# Patient Record
Sex: FEMALE | Race: WHITE | NOT HISPANIC OR LATINO | Employment: OTHER | URBAN - METROPOLITAN AREA
[De-identification: names, ages, dates, MRNs, and addresses within clinical notes are randomized per-mention and may not be internally consistent; named-entity substitution may affect disease eponyms.]

---

## 2017-07-28 ENCOUNTER — TELEPHONE (OUTPATIENT)
Dept: OBSTETRICS AND GYNECOLOGY | Facility: CLINIC | Age: 82
End: 2017-07-28

## 2017-07-28 DIAGNOSIS — Z85.3 HISTORY OF BREAST CANCER: Primary | ICD-10-CM

## 2017-07-28 NOTE — TELEPHONE ENCOUNTER
Last annual 10/16 with Dr. Lou. Patient has been having mammograms at an outside facility. Requesting mammogram at [place] this year. Patient has history of breast cancer at 36. Please place order. Unaware of this should be ordered as a diagnostic? Please advise .

## 2017-07-28 NOTE — TELEPHONE ENCOUNTER
----- Message from Maty Robertson sent at 7/28/2017  9:19 AM CDT -----  Contact: Self  Willa Kim  MRN: [id number]  Home Phone      [phone]  Work Phone      Not on file.  Mobile          [phone]    Patient Care Team:  Андрей Kenney MD as PCP - General (Family Medicine)  OB? No  What phone number can you be reached at? [phone]  Message:   Patient states she was getting all of her mammograms at [facility] and her Doctor no longer practices in that area, she is due for her mammogram and would like her orders send to [facility]. Please return call.

## 2017-07-31 ENCOUNTER — HOSPITAL ENCOUNTER (OUTPATIENT)
Dept: RADIOLOGY | Facility: HOSPITAL | Age: 82
Discharge: HOME | End: 2017-07-31
Attending: OBSTETRICS & GYNECOLOGY
Payer: MEDICARE

## 2017-07-31 VITALS — BODY MASS INDEX: 28.99 KG/M2 | HEIGHT: 65 IN | WEIGHT: 174 LBS

## 2017-07-31 DIAGNOSIS — Z85.3 HISTORY OF BREAST CANCER: ICD-10-CM

## 2017-07-31 DIAGNOSIS — Z12.31 ENCOUNTER FOR SCREENING MAMMOGRAM FOR BREAST CANCER: ICD-10-CM

## 2017-07-31 PROCEDURE — 77067 SCR MAMMO BI INCL CAD: CPT | Mod: 26,,, | Performed by: RADIOLOGY

## 2017-07-31 PROCEDURE — 77063 BREAST TOMOSYNTHESIS BI: CPT | Mod: 26,,, | Performed by: RADIOLOGY

## 2017-07-31 PROCEDURE — 77067 SCR MAMMO BI INCL CAD: CPT | Mod: TC

## 2017-09-25 PROBLEM — R35.0 FREQUENCY OF MICTURITION: Status: ACTIVE | Noted: 2017-09-25

## 2017-09-25 PROBLEM — R39.15 URGENCY OF MICTURITION: Status: ACTIVE | Noted: 2017-09-25

## 2017-09-25 PROBLEM — N39.41 URGE INCONTINENCE: Status: ACTIVE | Noted: 2017-09-25

## 2017-09-26 ENCOUNTER — TELEPHONE (OUTPATIENT)
Dept: OBSTETRICS AND GYNECOLOGY | Facility: CLINIC | Age: 82
End: 2017-09-26

## 2017-11-14 ENCOUNTER — OFFICE VISIT (OUTPATIENT)
Dept: OBSTETRICS AND GYNECOLOGY | Facility: CLINIC | Age: 82
End: 2017-11-14
Payer: MEDICARE

## 2017-11-14 VITALS
HEIGHT: 65 IN | RESPIRATION RATE: 14 BRPM | WEIGHT: 175 LBS | DIASTOLIC BLOOD PRESSURE: 76 MMHG | BODY MASS INDEX: 29.16 KG/M2 | SYSTOLIC BLOOD PRESSURE: 112 MMHG | HEART RATE: 55 BPM

## 2017-11-14 DIAGNOSIS — Z85.3 HISTORY OF LEFT BREAST CANCER: ICD-10-CM

## 2017-11-14 DIAGNOSIS — Z01.419 WELL WOMAN EXAM WITH ROUTINE GYNECOLOGICAL EXAM: Primary | ICD-10-CM

## 2017-11-14 DIAGNOSIS — Z90.79 HISTORY OF TOTAL HYSTERECTOMY WITH BILATERAL SALPINGO-OOPHORECTOMY (BSO): ICD-10-CM

## 2017-11-14 DIAGNOSIS — Z78.0 POSTMENOPAUSAL STATUS: ICD-10-CM

## 2017-11-14 DIAGNOSIS — Z90.722 HISTORY OF TOTAL HYSTERECTOMY WITH BILATERAL SALPINGO-OOPHORECTOMY (BSO): ICD-10-CM

## 2017-11-14 DIAGNOSIS — Z90.12 HISTORY OF MASTECTOMY, LEFT: ICD-10-CM

## 2017-11-14 DIAGNOSIS — Z90.710 HISTORY OF TOTAL HYSTERECTOMY WITH BILATERAL SALPINGO-OOPHORECTOMY (BSO): ICD-10-CM

## 2017-11-14 DIAGNOSIS — N39.0 RECURRENT UTI: ICD-10-CM

## 2017-11-14 PROCEDURE — G0101 CA SCREEN;PELVIC/BREAST EXAM: HCPCS | Mod: PBBFAC,PN | Performed by: OBSTETRICS & GYNECOLOGY

## 2017-11-14 PROCEDURE — 99999 PR PBB SHADOW E&M-EST. PATIENT-LVL III: CPT | Mod: PBBFAC,,, | Performed by: OBSTETRICS & GYNECOLOGY

## 2017-11-14 PROCEDURE — G0101 CA SCREEN;PELVIC/BREAST EXAM: HCPCS | Mod: S$PBB,,, | Performed by: OBSTETRICS & GYNECOLOGY

## 2017-11-14 PROCEDURE — 99213 OFFICE O/P EST LOW 20 MIN: CPT | Mod: PBBFAC,25,PN | Performed by: OBSTETRICS & GYNECOLOGY

## 2017-11-14 RX ORDER — CIPROFLOXACIN 250 MG/5ML
KIT ORAL 2 TIMES DAILY
COMMUNITY
End: 2017-12-27 | Stop reason: CLARIF

## 2017-11-14 RX ORDER — CIPROFLOXACIN 250 MG/1
250 TABLET, FILM COATED ORAL 2 TIMES DAILY
Qty: 20 TABLET | Refills: 0 | Status: SHIPPED | OUTPATIENT
Start: 2017-11-14 | End: 2017-11-24

## 2017-11-14 NOTE — PROGRESS NOTES
Subjective:    Patient ID: Willa Kim is a 82 y.o. y.o. female.     Chief Complaint: Annual Well Woman Exam     History of Present Illness:  Willa Vigil presents today for Annual Well Woman exam. She has a history of hysterectomy with BSO.  She is currently using no hormone replacement therapy and she reports no problems with menopausal symptoms. She denies breast tenderness, masses, nipple discharge. She has a history of left breast cancer when she was 36 years old and underwent mastectomy.  Her last mammogram in 07/17 was negative.  She has had recurrent UTIs and is currently still taking cipro.  Requests refill of this.  Did not have any improvement in her symptoms with macrobid.  Has seen urologist and had culture recently but states symptoms have not fully resolved.  Denies fever, chills, nausea, and vomiting.  She denies difficulty with bowel movements. She is current with colonoscopy.  She is current with health maintenance.    Past Medical History:   Diagnosis Date    A-fib     Arthritis     Breast cancer @ 37y/o    Left breast    CKD (chronic kidney disease)     Coronary artery disease     Diabetes mellitus     Encounter for blood transfusion     GERD (gastroesophageal reflux disease)     High cholesterol     History of mammogram 10/2/12    Normal    History of small bowel obstruction     Hypertension     YANIQUE (obstructive sleep apnea)     Osteoporosis, unspecified     PSVT (paroxysmal supraventricular tachycardia)      Past Surgical History:   Procedure Laterality Date    APPENDECTOMY      BACK SURGERY      CERVICAL FUSION      CHOLECYSTECTOMY      COLON SURGERY      CYSTOSCOPY      HYSTERECTOMY      DILLON --bleeding/fibroids    JOINT REPLACEMENT Right     knee    KNEE ARTHROSCOPY Right     MASTECTOMY  @ 37y/o    Left Breast    OOPHORECTOMY      BSO     Review of patient's allergies indicates:   Allergen Reactions    Celestone [betamethasone sodium phosphate]      High  blood pressure.    Keflex [cephalexin] Other (See Comments)     blisters    Pcn [penicillins] Swelling    Percodan [oxycodone hcl-oxycodone-asa]      Itching    Sulfa (sulfonamide antibiotics) Nausea And Vomiting    Trimethoprim Itching    Vibramycin [doxycycline calcium]      Heart race     Current Outpatient Prescriptions on File Prior to Visit   Medication Sig Dispense Refill    amiodarone (PACERONE) 200 MG Tab Take 100 mg by mouth once daily.       atorvastatin (LIPITOR) 10 MG tablet Take 5 mg by mouth once daily.       CALCIUM-VITAMIN D3 ORAL Take 1 tablet by mouth 2 (two) times daily. CALCIUM 1200MG/VITAMIN D3 1000IU      CRANBERRY FRUIT ORAL Take 4,200 mg by mouth 2 (two) times daily.      diphenoxylate-atropine 2.5-0.025 mg (LOMOTIL) 2.5-0.025 mg per tablet Take 1 tablet by mouth 4 (four) times daily as needed for Diarrhea.      furosemide (LASIX) 20 MG tablet Take 20 mg by mouth once daily.       losartan (COZAAR) 50 MG tablet Take 50 mg by mouth once daily.      metformin (GLUCOPHAGE) 500 MG tablet Take 500 mg by mouth 2 (two) times daily with meals. One tab in am ; 2 tabs in pm.      metoprolol tartrate (LOPRESSOR) 50 MG tablet Take 50 mg by mouth 2 (two) times daily.       mv-min-FA-D3-om3-dha-epa-fish (CARDIAMIN) 200 mcg-500 unit-200 mg Cap Take 2 capsules by mouth once daily.      nitrofurantoin, macrocrystal-monohydrate, (MACROBID) 100 MG capsule Take 100 mg by mouth 2 (two) times daily.      oxybutynin (DITROPAN-XL) 10 MG 24 hr tablet Take 10 mg by mouth once daily.      pantoprazole (PROTONIX) 40 MG tablet Take 40 mg by mouth once daily.       rivaroxaban (XARELTO) 15 mg Tab Take 15 mg by mouth once. Hold 10/3      tramadol (ULTRAM) 50 mg tablet Take 50 mg by mouth every 6 (six) hours as needed for Pain.      trimethoprim (TRIMPEX) 100 mg Tab Take 100 mg by mouth once daily.        No current facility-administered medications on file prior to visit.      Social History    Substance Use Topics    Smoking status: Former Smoker    Smokeless tobacco: Never Used    Alcohol use No     Family History   Problem Relation Age of Onset    Cancer Brother      Bladder    Cancer Sister      stomach    Heart disease Mother     Heart disease Father     Cancer Son      lung    Breast cancer Neg Hx     Colon cancer Neg Hx     Ovarian cancer Neg Hx      The following portions of the patient's history were reviewed and updated as appropriate: allergies, current medications, past family history, past medical history, past social history, past surgical history and problem list.    Menstrual History:   No LMP recorded. Patient has had a hysterectomy.    OB History      Para Term  AB Living    3 3 2 1   2    SAB TAB Ectopic Multiple Live Births            2            ROS:   CONSTITUTIONAL: Negative for fever, chills, diaphoresis, weakness, fatigue, weight loss, weight gain  ENT: negative for sore throat, nasal congestion, nasal discharge, epistaxis, tinnitus, hearing loss  EYES: negative for blurry vision, decreased vision, loss of vision, eye pain, diplopia, photophobia, discharge  SKIN: Negative for rash, itching, hives  RESPIRATORY: negative for cough, hemoptysis, shortness of breath, pleuritic chest pain, wheezing  CARDIOVASCULAR: negative for chest pain, dyspnea on exertion, orthopnea, paroxysmal nocturnal dyspnea, edema, palpitations  BREAST: negative for breast pain, mass, nipple changes, nipple discharge  GASTROINTESTINAL: negative for abdominal pain, flank pain, nausea, vomiting, diarrhea, constipation, black stool, blood in stool  GENITOURINARY: positive for urinary frequency/UTI; negative for abnormal vaginal bleeding, amenorrhea, decreased libido, dysuria, genital sores, hematuria, incontinence, menorrhagia, pelvic pain, vaginal discharge  HEMATOLOGIC/LYMPHATIC: negative for swollen lymph nodes, bleeding, bruising  MUSCULOSKELETAL: negative for back pain, joint pain,  joint stiffness, joint swelling, muscle pain, muscle weakness  NEUROLOGICAL: negative for dizzy/vertigo, headache, focal weakness, numbness/tingling, speech problems, loss of consciousness, confusion, memory loss  BEHAVORIAL/PSYCH: negative for anxiety, depression, psychosis  ENDOCRINE: negative for polydipsia/polyuria, palpitations, skin changes, temperature intolerance, unexpected weight changes  ALLERGIC/IMMUNOLOGIC: negative for urticaria, hay fever, angioedema      Objective:    Vital Signs:  Vitals:    11/14/17 1206   BP: 112/76   Pulse: (!) 55   Resp: 14       Physical Exam:  General:  alert; oriented x 4; well-nourished female   Skin:  Skin color, texture, turgor normal. No rashes or lesions   HEENT:  conjunctivae/corneas clear.    Neck: supple, trachea midline   Respiratory:  clear to auscultation bilaterally   Heart:  regular rate and rhythm   Breasts: Left breast surgically absent.  No palpable masses of right breast.  Right nipple protruding.  No nipple discharge.  No skin changes, erythema, or tenderness. No lymphadenopathy.   Abdomen:  soft, non-tender; bowel sounds normal. No CVA tenderness.    Pelvis: External genitalia: normal general appearance  Urinary system: urethral meatus normal, bladder nontender  Vaginal: atrophic mucosa; no significant prolapse; no lesions  Cervix: removed surgically  Uterus: removed surgically  Adnexa: removed surgically; nontender; no palpable masses; difficult exam secondary to body habitus   Extremities: Normal ROM; no edema, no cyanosis   Neurologial: Normal strength and tone. No focal numbness or weakness. Reflexes 2+ and equal.   Psychiatric: normal mood, speech, dress, and thought processes         Assessment:      1. Well woman exam with routine gynecological exam    2. Postmenopausal status    3. History of total hysterectomy with bilateral salpingo-oophorectomy (BSO)    4. Recurrent UTI    5. History of left breast cancer    6. History of mastectomy, left           Plan:      Well woman exam with routine gynecological exam    Postmenopausal status    History of total hysterectomy with bilateral salpingo-oophorectomy (BSO)    Recurrent UTI  -     ciprofloxacin HCl (CIPRO) 250 MG tablet; Take 1 tablet (250 mg total) by mouth 2 (two) times daily.  Dispense: 20 tablet; Refill: 0    History of left breast cancer    History of mastectomy, left        COUNSELING:  Willa Vigil was counseled on A.C.O.G. Pap guidelines and recommendations for yearly pelvic exams in addition to recommendations for yearly mammograms and monthly self breast exams. In addition she was counseled on DEXA and on adequate intake of calcium and vitamin D.  She is to see her PCP for other health maintenance. Refill cipro.  Pt advised to follow back up with urology as soon as possible.

## 2017-12-11 PROBLEM — M17.12 PRIMARY OSTEOARTHRITIS OF LEFT KNEE: Status: ACTIVE | Noted: 2017-12-11

## 2018-01-03 PROBLEM — K21.9 GASTROESOPHAGEAL REFLUX DISEASE WITHOUT ESOPHAGITIS: Status: ACTIVE | Noted: 2018-01-03

## 2018-01-03 PROBLEM — N18.9 CKD (CHRONIC KIDNEY DISEASE): Status: ACTIVE | Noted: 2018-01-03

## 2018-01-03 PROBLEM — I48.91 A-FIB: Status: ACTIVE | Noted: 2018-01-03

## 2018-01-03 PROBLEM — I10 ESSENTIAL HYPERTENSION: Status: ACTIVE | Noted: 2018-01-03

## 2018-02-01 PROBLEM — M48.56XA COMPRESSION FRACTURE OF LUMBAR SPINE, NON-TRAUMATIC: Status: ACTIVE | Noted: 2018-02-01

## 2018-02-01 PROBLEM — S32.020A COMPRESSION FRACTURE OF L2 LUMBAR VERTEBRA: Status: ACTIVE | Noted: 2018-02-01

## 2018-07-24 ENCOUNTER — TELEPHONE (OUTPATIENT)
Dept: OBSTETRICS AND GYNECOLOGY | Facility: CLINIC | Age: 83
End: 2018-07-24

## 2018-07-24 DIAGNOSIS — Z12.31 ENCOUNTER FOR SCREENING MAMMOGRAM FOR BREAST CANCER: Primary | ICD-10-CM

## 2018-07-24 NOTE — TELEPHONE ENCOUNTER
Patient requesting Mammogram orders. Patient has a history of breast cancer and a mastectomy. Last annual 11/2017. Please advise.

## 2018-07-24 NOTE — TELEPHONE ENCOUNTER
----- Message from Yenny Conte MA sent at 7/24/2018  2:16 PM CDT -----  Contact: SELF  Willa Kim  MRN: [id number]  Home Phone      [phone]  Work Phone      Not on file.  Mobile          [phone]    Patient Care Team:  Андрей Kenney MD as PCP - General (Family Medicine)  OB? No  What phone number can you be reached at?   [phone]  Message:    Would like to schedule mammo.

## 2018-08-02 ENCOUNTER — HOSPITAL ENCOUNTER (OUTPATIENT)
Dept: RADIOLOGY | Facility: HOSPITAL | Age: 83
Discharge: HOME | End: 2018-08-02
Attending: OBSTETRICS & GYNECOLOGY
Payer: MEDICARE

## 2018-08-02 VITALS — HEIGHT: 65 IN | WEIGHT: 173 LBS | BODY MASS INDEX: 28.82 KG/M2

## 2018-08-02 DIAGNOSIS — Z12.31 ENCOUNTER FOR SCREENING MAMMOGRAM FOR BREAST CANCER: ICD-10-CM

## 2018-08-02 PROCEDURE — 77063 BREAST TOMOSYNTHESIS BI: CPT | Mod: 26,,, | Performed by: RADIOLOGY

## 2018-08-02 PROCEDURE — 77067 SCR MAMMO BI INCL CAD: CPT | Mod: 26,,, | Performed by: RADIOLOGY

## 2018-08-02 PROCEDURE — 77067 SCR MAMMO BI INCL CAD: CPT | Mod: TC

## 2019-01-10 ENCOUNTER — TELEPHONE (OUTPATIENT)
Dept: OBSTETRICS AND GYNECOLOGY | Facility: CLINIC | Age: 84
End: 2019-01-10

## 2019-01-10 NOTE — TELEPHONE ENCOUNTER
----- Message from Yenny Cotne MA sent at 1/10/2019  1:39 PM CST -----  Contact: self  Willa Kim  MRN: [id number]  Home Phone      [phone]  Work Phone      Not on file.  Mobile          [phone]    Patient Care Team:  Андрей Kenney MD as PCP - General (Family Medicine)  OB? No  What phone number can you be reached at?  [phone]  Message:   Would like mammo results.  Stated mammo was done in August and was never called with results.

## 2019-01-10 NOTE — TELEPHONE ENCOUNTER
Patient should have been mailed a letter stating it was normal. We do not call for normal results.

## 2019-01-10 NOTE — TELEPHONE ENCOUNTER
Patient requesting Mammogram results from August. States she never received results. Please advise.

## 2019-06-05 PROBLEM — Z12.11 COLON CANCER SCREENING: Status: ACTIVE | Noted: 2019-06-05

## 2019-08-15 ENCOUNTER — TELEPHONE (OUTPATIENT)
Dept: OBSTETRICS AND GYNECOLOGY | Facility: CLINIC | Age: 84
End: 2019-08-15

## 2019-08-15 DIAGNOSIS — Z12.31 BREAST CANCER SCREENING BY MAMMOGRAM: Primary | ICD-10-CM

## 2019-08-15 NOTE — TELEPHONE ENCOUNTER
----- Message from Collette Brunson MA sent at 8/15/2019 11:39 AM CDT -----  Contact: Self      ----- Message -----  From: Maty Robertson  Sent: 8/15/2019  11:13 AM  To: Josi Cerrato Staff    Willa Kim  MRN: [id number]  Home Phone      [phone]  Work Phone      Not on file.  [employer]          [phone]    Patient Care Team:  Андрей Kenney MD as PCP - General (Family Medicine)  OB? No  What phone number can you be reached at?   Message:   Please link mammogram orders. Thanks.

## 2019-09-24 ENCOUNTER — HOSPITAL ENCOUNTER (OUTPATIENT)
Dept: RADIOLOGY | Facility: HOSPITAL | Age: 84
Discharge: HOME | End: 2019-09-24
Attending: ADVANCED PRACTICE MIDWIFE
Payer: MEDICARE

## 2019-09-24 VITALS — HEIGHT: 65 IN | BODY MASS INDEX: 29.49 KG/M2 | WEIGHT: 177 LBS

## 2019-09-24 DIAGNOSIS — Z12.31 BREAST CANCER SCREENING BY MAMMOGRAM: ICD-10-CM

## 2019-09-24 PROCEDURE — 77067 SCR MAMMO BI INCL CAD: CPT | Mod: 26,52,, | Performed by: RADIOLOGY

## 2019-09-24 PROCEDURE — 77063 BREAST TOMOSYNTHESIS BI: CPT | Mod: 26,52,, | Performed by: RADIOLOGY

## 2019-09-24 PROCEDURE — 77067 SCR MAMMO BI INCL CAD: CPT | Mod: TC

## 2019-09-24 PROCEDURE — 77067 MAMMO DIGITAL SCREENING RIGHT WITH TOMOSYNTHESIS_CAD: ICD-10-PCS | Mod: 26,52,, | Performed by: RADIOLOGY

## 2019-09-24 PROCEDURE — 77063 MAMMO DIGITAL SCREENING RIGHT WITH TOMOSYNTHESIS_CAD: ICD-10-PCS | Mod: 26,52,, | Performed by: RADIOLOGY

## 2019-10-14 ENCOUNTER — OFFICE VISIT (OUTPATIENT)
Dept: OBSTETRICS AND GYNECOLOGY | Facility: CLINIC | Age: 84
End: 2019-10-14
Payer: MEDICARE

## 2019-10-14 VITALS
HEIGHT: 65 IN | SYSTOLIC BLOOD PRESSURE: 114 MMHG | BODY MASS INDEX: 28.49 KG/M2 | HEART RATE: 60 BPM | RESPIRATION RATE: 16 BRPM | DIASTOLIC BLOOD PRESSURE: 60 MMHG | WEIGHT: 171 LBS

## 2019-10-14 DIAGNOSIS — Z78.0 POSTMENOPAUSAL STATUS: Primary | ICD-10-CM

## 2019-10-14 PROCEDURE — G0101 PR CA SCREEN;PELVIC/BREAST EXAM: ICD-10-PCS | Mod: S$PBB,,, | Performed by: ADVANCED PRACTICE MIDWIFE

## 2019-10-14 PROCEDURE — G0101 CA SCREEN;PELVIC/BREAST EXAM: HCPCS | Mod: S$PBB,,, | Performed by: ADVANCED PRACTICE MIDWIFE

## 2019-10-14 PROCEDURE — 99213 OFFICE O/P EST LOW 20 MIN: CPT | Mod: PBBFAC,PN,25 | Performed by: ADVANCED PRACTICE MIDWIFE

## 2019-10-14 PROCEDURE — G0101 CA SCREEN;PELVIC/BREAST EXAM: HCPCS | Mod: PBBFAC,PN | Performed by: ADVANCED PRACTICE MIDWIFE

## 2019-10-14 PROCEDURE — 99999 PR PBB SHADOW E&M-EST. PATIENT-LVL III: ICD-10-PCS | Mod: PBBFAC,,, | Performed by: ADVANCED PRACTICE MIDWIFE

## 2019-10-14 PROCEDURE — 99999 PR PBB SHADOW E&M-EST. PATIENT-LVL III: CPT | Mod: PBBFAC,,, | Performed by: ADVANCED PRACTICE MIDWIFE

## 2019-10-14 NOTE — PROGRESS NOTES
"  Subjective:    Patient ID: Willa Kim is a 84 y.o. y.o. female.     Chief Complaint: Annual Well Woman Exam     History of Present Illness:  Willa Vigil presents today for Annual Well Woman exam. She describes her menses as hysterectory .She denies pelvic pain.  She denies breast tenderness, masses, nipple discharge. She admits difficulty with urination and is taking meds for this that is working well or bowel movements. She denies menopausal symptoms such as hotflashes, vaginal dryness, and night sweats. She denies bloating, early satiety, or weight changes. She is sexually active.     The following portions of the patient's history were reviewed and updated as appropriate: allergies, current medications, past family history, past medical history, past social history, past surgical history and problem list.      Menstrual History:   No LMP recorded. Patient has had a hysterectomy..     OB History        3    Para   3    Term   2       1    AB        Living   2       SAB        TAB        Ectopic        Multiple        Live Births   2                 The following portions of the patient's history were reviewed and updated as appropriate: allergies, current medications, past family history, past medical history, past social history, past surgical history and problem list.        ROS:     Review of Systems   Endocrine: Positive for diabetes and hair loss.   Genitourinary: Positive for frequency. Negative for vaginal bleeding, vaginal discharge, vaginal pain and vaginal odor.   Neurological: Positive for headaches.   All other systems reviewed and are negative.      Objective:    Vital Signs:  Vitals:    10/14/19 1107   BP: 114/60   Pulse: 60   Resp: 16   Weight: 77.6 kg (171 lb)   Height: 5' 5" (1.651 m)         Physical Exam:  General:  alert, cooperative, appears stated age   Skin:  Skin color, texture, turgor normal. No rashes or lesions   HEENT:  conjunctivae/corneas clear. PERRL. "   Neck: supple, trachea midline, no adenopathy or thyromegally   Respiratory:  clear to auscultation bilaterally   Heart:  regular rate and rhythm, S1, S2 normal, no murmur, click, rub or gallop   Breasts:  no discharge, erythema, or tenderness, reconstructive implant at mastectomy site without palpable abnormalities   Abdomen:  normal findings: bowel sounds normal, no masses palpable, no organomegaly and soft, non-tender   Pelvis: External genitalia: normal general appearance  Urinary system: urethral meatus normal, bladder nontender  Vaginal: normal mucosa without prolapse or lesions  Cervix: removed surgically  Uterus: removed surgically  Adnexa: removed surgically   Extremities: Normal ROM; no edema, no cyanosis   Neurologial: Normal strength and tone. No focal numbness or weakness. Reflexes 2+ and equal.   Psychiatric: normal mood, speech, dress, and thought processes         Assessment:       Healthy female exam.     No diagnosis found.      Plan:       All questions answered.  Breast self exam technique reviewed and patient encouraged to perform self-exam monthly.  Follow up in 1 year.  Follow up as needed.   Dexa scan ordered      COUNSELING:  Willa Vigil was counseled on  A.C.O.G. Pap guidelines and recommendations for yearly pelvic exams in addition to recommendations for monthly self breast exams; to see her PCP for other health maintenance.

## 2020-01-21 PROBLEM — S72.102A CLOSED FRACTURE OF TROCHANTER OF LEFT FEMUR: Status: ACTIVE | Noted: 2020-01-21

## 2020-05-18 ENCOUNTER — PATIENT OUTREACH (OUTPATIENT)
Dept: ADMINISTRATIVE | Facility: OTHER | Age: 85
End: 2020-05-18

## 2020-05-18 VITALS — DIASTOLIC BLOOD PRESSURE: 68 MMHG | SYSTOLIC BLOOD PRESSURE: 124 MMHG | HEART RATE: 56 BPM | OXYGEN SATURATION: 98 %

## 2020-09-17 ENCOUNTER — TELEPHONE (OUTPATIENT)
Dept: OBSTETRICS AND GYNECOLOGY | Facility: CLINIC | Age: 85
End: 2020-09-17

## 2020-09-17 DIAGNOSIS — Z12.31 BREAST CANCER SCREENING BY MAMMOGRAM: Primary | ICD-10-CM

## 2020-09-17 NOTE — TELEPHONE ENCOUNTER
----- Message from Maty Robertson sent at 2020 10:39 AM CDT -----  Regarding: Self  Willa Kim  MRN: [id number]  : 1935  PCP: Rajiv Conte  Home Phone      [phone]  Work Phone      Not on file.  Mobile          [phone]      MESSAGE:   Please link mammogram orders. Thanks.

## 2020-11-11 ENCOUNTER — OFFICE VISIT (OUTPATIENT)
Dept: OBSTETRICS AND GYNECOLOGY | Facility: CLINIC | Age: 85
End: 2020-11-11
Payer: MEDICARE

## 2020-11-11 ENCOUNTER — HOSPITAL ENCOUNTER (OUTPATIENT)
Dept: RADIOLOGY | Facility: HOSPITAL | Age: 85
Discharge: HOME | End: 2020-11-11
Attending: OBSTETRICS & GYNECOLOGY
Payer: MEDICARE

## 2020-11-11 VITALS
HEIGHT: 65 IN | WEIGHT: 172 LBS | HEIGHT: 65 IN | RESPIRATION RATE: 18 BRPM | BODY MASS INDEX: 28.66 KG/M2 | DIASTOLIC BLOOD PRESSURE: 82 MMHG | HEART RATE: 59 BPM | WEIGHT: 175.38 LBS | SYSTOLIC BLOOD PRESSURE: 124 MMHG | BODY MASS INDEX: 29.22 KG/M2

## 2020-11-11 DIAGNOSIS — Z85.3 HISTORY OF LEFT BREAST CANCER: ICD-10-CM

## 2020-11-11 DIAGNOSIS — Z12.39 SCREENING BREAST EXAMINATION: ICD-10-CM

## 2020-11-11 DIAGNOSIS — Z12.31 BREAST CANCER SCREENING BY MAMMOGRAM: ICD-10-CM

## 2020-11-11 DIAGNOSIS — Z01.419 ENCOUNTER FOR ROUTINE GYNECOLOGIC EXAMINATION IN MEDICARE PATIENT: Primary | ICD-10-CM

## 2020-11-11 PROCEDURE — 77063 MAMMO DIGITAL SCREENING RIGHT WITH TOMO: ICD-10-PCS | Mod: 26,,, | Performed by: RADIOLOGY

## 2020-11-11 PROCEDURE — 77067 SCR MAMMO BI INCL CAD: CPT | Mod: TC

## 2020-11-11 PROCEDURE — 77067 MAMMO DIGITAL SCREENING RIGHT WITH TOMO: ICD-10-PCS | Mod: 26,,, | Performed by: RADIOLOGY

## 2020-11-11 PROCEDURE — 99999 PR STA SHADOW: CPT | Mod: PBBFAC,,, | Performed by: OBSTETRICS & GYNECOLOGY

## 2020-11-11 PROCEDURE — G0101 CA SCREEN;PELVIC/BREAST EXAM: HCPCS | Mod: S$PBB | Performed by: OBSTETRICS & GYNECOLOGY

## 2020-11-11 PROCEDURE — 99999 PR PBB SHADOW E&M-EST. PATIENT-LVL IV: CPT | Mod: PBBFAC,,, | Performed by: OBSTETRICS & GYNECOLOGY

## 2020-11-11 PROCEDURE — 77063 BREAST TOMOSYNTHESIS BI: CPT | Mod: 26,,, | Performed by: RADIOLOGY

## 2020-11-11 PROCEDURE — 99214 OFFICE O/P EST MOD 30 MIN: CPT | Mod: PBBFAC | Performed by: OBSTETRICS & GYNECOLOGY

## 2020-11-11 PROCEDURE — 99999 PR PBB SHADOW E&M-EST. PATIENT-LVL IV: ICD-10-PCS | Mod: PBBFAC,,, | Performed by: OBSTETRICS & GYNECOLOGY

## 2020-11-11 PROCEDURE — 77067 SCR MAMMO BI INCL CAD: CPT | Mod: 26,,, | Performed by: RADIOLOGY

## 2020-11-11 RX ORDER — ALBUTEROL SULFATE 0.83 MG/ML
SOLUTION RESPIRATORY (INHALATION)
COMMUNITY

## 2020-11-11 RX ORDER — FLUTICASONE PROPIONATE 50 MCG
SPRAY, SUSPENSION (ML) NASAL
COMMUNITY

## 2020-11-11 RX ORDER — CETIRIZINE HYDROCHLORIDE 10 MG/1
TABLET ORAL
COMMUNITY

## 2020-11-11 RX ORDER — KETOCONAZOLE 20 MG/ML
SHAMPOO, SUSPENSION TOPICAL
COMMUNITY

## 2020-11-11 RX ORDER — OXYBUTYNIN CHLORIDE 10 MG/1
TABLET, EXTENDED RELEASE ORAL
COMMUNITY

## 2020-11-11 RX ORDER — TRIMETHOPRIM 100 MG/1
TABLET ORAL
COMMUNITY
Start: 2020-10-26 | End: 2022-04-05

## 2020-11-11 RX ORDER — TAMSULOSIN HYDROCHLORIDE 0.4 MG/1
CAPSULE ORAL
COMMUNITY

## 2020-11-11 RX ORDER — OLMESARTAN MEDOXOMIL 20 MG/1
TABLET ORAL
COMMUNITY
End: 2022-04-05

## 2020-11-11 NOTE — PROGRESS NOTES
Subjective:    Patient ID: Willa Kim is a 85 y.o. y.o. female.     Chief Complaint: Annual Well Woman Exam     History of Present Illness:  Willa Vigil presents today for Annual Well Woman exam. She describes her menses as absent, h/o hysterecotmy and BSO.She denies pelvic pain.  She denies breast tenderness, masses, nipple discharge. She denies difficulty with urination or bowel movements. She denies menopausal symptoms such as hotflashes, vaginal dryness, and night sweats. She denies bloating, early satiety, or weight changes. She is not sexually active.     Menstrual History:   No LMP recorded. Patient has had a hysterectomy..     OB History    : 3  Para: 3  Term: 2  : 1  Livin  Live Births: 2            The following portions of the patient's history were reviewed and updated as appropriate: allergies, current medications, past family history, past medical history, past social history, past surgical history and problem list.    ROS:   CONSTITUTIONAL: Negative for fever, chills, diaphoresis, weakness, fatigue, weight loss, weight gain  ENT: negative for sore throat, nasal congestion, nasal discharge, epistaxis, tinnitus, hearing loss  EYES: negative for blurry vision, decreased vision, loss of vision, eye pain, diplopia, photophobia, discharge  SKIN: Negative for rash, itching, hives  RESPIRATORY: negative for cough, hemoptysis, shortness of breath, pleuritic chest pain, wheezing  CARDIOVASCULAR: negative for chest pain, dyspnea on exertion, orthopnea, paroxysmal nocturnal dyspnea, edema, palpitations  BREAST: negative for breast  tenderness, breast mass, nipple discharge, or skin changes  GASTROINTESTINAL: negative for abdominal pain, flank pain, nausea, vomiting, diarrhea, constipation, black stool, blood in stool  GENITOURINARY: negative for abnormal vaginal bleeding, amenorrhea, decreased libido, dysuria, genital sores, hematuria, incontinence, menorrhagia, pelvic pain,  urinary frequency, vaginal discharge  HEMATOLOGIC/LYMPHATIC: negative for swollen lymph nodes, bleeding, bruising  MUSCULOSKELETAL: muscle pain, muscle weakness, Denies: back pain, joint pain, joint stiffness, joint swelling  NEUROLOGICAL: negative for dizzy/vertigo, headache, focal weakness, numbness/tingling, speech problems, loss of consciousness, confusion, memory loss  BEHAVORIAL/PSYCH: negative for anxiety, depression, psychosis  ENDOCRINE: diabetes, negative for polydipsia/polyuria, palpitations, skin changes, temperature intolerance, unexpected weight changes  ALLERGIC/IMMUNOLOGIC: negative for urticaria, hay fever, angioedema      Objective:    Vital Signs:  Vitals:    11/11/20 1139   BP: 124/82   Pulse: (Abnormal) 59   Resp: 18       Physical Exam:  General:  alert, cooperative, no distress   Skin:  Skin color, texture, turgor normal. No rashes or lesions   HEENT:  extra ocular movement intact, sclera clear, anicteric   Neck: supple, trachea midline, no adenopathy or thyromegally   Respiratory:  Normal effort   Breasts:  no discharge, erythema, tenderness, or palpable masses; no axillary lymphadenopathy, left mastectomy   Abdomen:  soft, nontender, no palpable masses   Pelvis: External genitalia: normal general appearance  Urinary system: urethral meatus normal, bladder nontender  Vaginal: atrophic mucosa  Cervix: removed surgically  Uterus: removed surgically  Adnexa: removed surgically   Extremities: Normal ROM; no edema, no cyanosis   Neurologial: Normal strength and tone. No focal numbness or weakness.   Psychiatric: normal mood, speech, dress, and thought processes         Assessment:       Healthy female exam.     1. Encounter for routine gynecologic examination in Medicare patient    2. History of left breast cancer    3. Screening breast examination          Plan:      Encounter for routine gynecologic examination in Medicare patient    History of left breast cancer    Screening breast  examination          COUNSELING:  Willa Vigil was counseled on A.C.O.G. Pap guidelines and recommendations for yearly pelvic exams in addition to recommendations for monthly self breast exams; to see her PCP for other health maintenance.

## 2021-11-29 ENCOUNTER — TELEPHONE (OUTPATIENT)
Dept: OBSTETRICS AND GYNECOLOGY | Facility: CLINIC | Age: 86
End: 2021-11-29
Payer: MEDICARE

## 2021-11-29 DIAGNOSIS — Z12.31 BREAST CANCER SCREENING BY MAMMOGRAM: Primary | ICD-10-CM

## 2022-04-05 ENCOUNTER — OFFICE VISIT (OUTPATIENT)
Dept: OBSTETRICS AND GYNECOLOGY | Facility: CLINIC | Age: 87
End: 2022-04-05
Payer: MEDICARE

## 2022-04-05 ENCOUNTER — HOSPITAL ENCOUNTER (OUTPATIENT)
Dept: RADIOLOGY | Facility: HOSPITAL | Age: 87
Discharge: HOME | End: 2022-04-05
Attending: OBSTETRICS & GYNECOLOGY
Payer: MEDICARE

## 2022-04-05 VITALS — BODY MASS INDEX: 29.16 KG/M2 | WEIGHT: 175 LBS | HEIGHT: 65 IN

## 2022-04-05 VITALS
WEIGHT: 179 LBS | RESPIRATION RATE: 22 BRPM | SYSTOLIC BLOOD PRESSURE: 126 MMHG | DIASTOLIC BLOOD PRESSURE: 84 MMHG | HEART RATE: 106 BPM | BODY MASS INDEX: 29.82 KG/M2 | HEIGHT: 65 IN

## 2022-04-05 DIAGNOSIS — Z01.419 ENCOUNTER FOR ROUTINE GYNECOLOGIC EXAMINATION IN MEDICARE PATIENT: ICD-10-CM

## 2022-04-05 DIAGNOSIS — Z85.3 HISTORY OF LEFT BREAST CANCER: ICD-10-CM

## 2022-04-05 DIAGNOSIS — Z01.419 WELL WOMAN EXAM WITH ROUTINE GYNECOLOGICAL EXAM: Primary | ICD-10-CM

## 2022-04-05 DIAGNOSIS — Z12.31 BREAST CANCER SCREENING BY MAMMOGRAM: ICD-10-CM

## 2022-04-05 PROCEDURE — 1126F PR PAIN SEVERITY QUANTIFIED, NO PAIN PRESENT: ICD-10-PCS | Mod: CPTII,S$GLB,, | Performed by: OBSTETRICS & GYNECOLOGY

## 2022-04-05 PROCEDURE — 77063 MAMMO DIGITAL SCREENING RIGHT WITH TOMO: ICD-10-PCS | Mod: 26,,, | Performed by: RADIOLOGY

## 2022-04-05 PROCEDURE — 3074F PR MOST RECENT SYSTOLIC BLOOD PRESSURE < 130 MM HG: ICD-10-PCS | Mod: CPTII,S$GLB,, | Performed by: OBSTETRICS & GYNECOLOGY

## 2022-04-05 PROCEDURE — 77067 SCR MAMMO BI INCL CAD: CPT | Mod: 26,,, | Performed by: RADIOLOGY

## 2022-04-05 PROCEDURE — 1160F RVW MEDS BY RX/DR IN RCRD: CPT | Mod: CPTII,S$GLB,, | Performed by: OBSTETRICS & GYNECOLOGY

## 2022-04-05 PROCEDURE — G0101 CA SCREEN;PELVIC/BREAST EXAM: HCPCS | Mod: GZ,S$GLB,, | Performed by: OBSTETRICS & GYNECOLOGY

## 2022-04-05 PROCEDURE — 1101F PR PT FALLS ASSESS DOC 0-1 FALLS W/OUT INJ PAST YR: ICD-10-PCS | Mod: CPTII,S$GLB,, | Performed by: OBSTETRICS & GYNECOLOGY

## 2022-04-05 PROCEDURE — 1160F PR REVIEW ALL MEDS BY PRESCRIBER/CLIN PHARMACIST DOCUMENTED: ICD-10-PCS | Mod: CPTII,S$GLB,, | Performed by: OBSTETRICS & GYNECOLOGY

## 2022-04-05 PROCEDURE — 3288F PR FALLS RISK ASSESSMENT DOCUMENTED: ICD-10-PCS | Mod: CPTII,S$GLB,, | Performed by: OBSTETRICS & GYNECOLOGY

## 2022-04-05 PROCEDURE — 1126F AMNT PAIN NOTED NONE PRSNT: CPT | Mod: CPTII,S$GLB,, | Performed by: OBSTETRICS & GYNECOLOGY

## 2022-04-05 PROCEDURE — 1159F MED LIST DOCD IN RCRD: CPT | Mod: CPTII,S$GLB,, | Performed by: OBSTETRICS & GYNECOLOGY

## 2022-04-05 PROCEDURE — 77067 MAMMO DIGITAL SCREENING RIGHT WITH TOMO: ICD-10-PCS | Mod: 26,,, | Performed by: RADIOLOGY

## 2022-04-05 PROCEDURE — 3074F SYST BP LT 130 MM HG: CPT | Mod: CPTII,S$GLB,, | Performed by: OBSTETRICS & GYNECOLOGY

## 2022-04-05 PROCEDURE — 99999 PR PBB SHADOW E&M-EST. PATIENT-LVL IV: ICD-10-PCS | Mod: PBBFAC,,, | Performed by: OBSTETRICS & GYNECOLOGY

## 2022-04-05 PROCEDURE — 3079F DIAST BP 80-89 MM HG: CPT | Mod: CPTII,S$GLB,, | Performed by: OBSTETRICS & GYNECOLOGY

## 2022-04-05 PROCEDURE — 3079F PR MOST RECENT DIASTOLIC BLOOD PRESSURE 80-89 MM HG: ICD-10-PCS | Mod: CPTII,S$GLB,, | Performed by: OBSTETRICS & GYNECOLOGY

## 2022-04-05 PROCEDURE — 1101F PT FALLS ASSESS-DOCD LE1/YR: CPT | Mod: CPTII,S$GLB,, | Performed by: OBSTETRICS & GYNECOLOGY

## 2022-04-05 PROCEDURE — 77063 BREAST TOMOSYNTHESIS BI: CPT | Mod: 26,,, | Performed by: RADIOLOGY

## 2022-04-05 PROCEDURE — 99999 PR PBB SHADOW E&M-EST. PATIENT-LVL IV: CPT | Mod: PBBFAC,,, | Performed by: OBSTETRICS & GYNECOLOGY

## 2022-04-05 PROCEDURE — G0101 PR CA SCREEN;PELVIC/BREAST EXAM: ICD-10-PCS | Mod: GZ,S$GLB,, | Performed by: OBSTETRICS & GYNECOLOGY

## 2022-04-05 PROCEDURE — 3288F FALL RISK ASSESSMENT DOCD: CPT | Mod: CPTII,S$GLB,, | Performed by: OBSTETRICS & GYNECOLOGY

## 2022-04-05 PROCEDURE — 77063 BREAST TOMOSYNTHESIS BI: CPT | Mod: TC

## 2022-04-05 PROCEDURE — 1159F PR MEDICATION LIST DOCUMENTED IN MEDICAL RECORD: ICD-10-PCS | Mod: CPTII,S$GLB,, | Performed by: OBSTETRICS & GYNECOLOGY

## 2022-04-05 RX ORDER — GLIPIZIDE 5 MG/1
5 TABLET ORAL 2 TIMES DAILY
COMMUNITY
Start: 2022-03-22

## 2022-04-05 RX ORDER — LATANOPROST 50 UG/ML
SOLUTION/ DROPS OPHTHALMIC
COMMUNITY

## 2022-04-05 RX ORDER — OLMESARTAN MEDOXOMIL 5 MG/1
TABLET ORAL DAILY
COMMUNITY
Start: 2022-01-11

## 2022-04-05 RX ORDER — FERROUS SULFATE, DRIED 160(50) MG
1 TABLET, EXTENDED RELEASE ORAL 2 TIMES DAILY WITH MEALS
COMMUNITY

## 2022-04-05 RX ORDER — DILTIAZEM HYDROCHLORIDE 120 MG/1
CAPSULE, COATED, EXTENDED RELEASE ORAL
COMMUNITY

## 2022-04-05 RX ORDER — GABAPENTIN 300 MG/1
300 CAPSULE ORAL 3 TIMES DAILY
COMMUNITY
Start: 2022-03-17

## 2022-04-05 NOTE — PROGRESS NOTES
Subjective:    Patient ID: Willa Kim is a 87 y.o. y.o. female.     Chief Complaint: Annual Well Woman Exam     History of Present Illness:  Willa Vigil presents today for Annual Well Woman exam. She describes her menses as absent, h/o hysterectomy and BSO. She denies pelvic pain.  She denies breast tenderness, masses, nipple discharge. She denies GYN complaints. She denies difficulty with urination or bowel movements. She denies menopausal symptoms such as hotflashes, vaginal dryness, and night sweats. She denies bloating, early satiety, or weight changes.        Menstrual History:   No LMP recorded. Patient has had a hysterectomy..     OB History    : 3  Para: 3  Term: 2  : 1  Livin  Live Births: 2            The following portions of the patient's history were reviewed and updated as appropriate: allergies, current medications, past family history, past medical history, past social history, past surgical history and problem list.    ROS:   CONSTITUTIONAL: Negative for fever, chills, diaphoresis, weakness, fatigue, weight loss, weight gain  ENT: negative for sore throat, nasal congestion, nasal discharge, epistaxis, tinnitus, hearing loss  EYES: blurry vision, Denies: decreased vision, loss of vision, eye pain, diplopia, discharge  SKIN: Negative for rash, itching, hives  RESPIRATORY: negative for cough, hemoptysis, shortness of breath, pleuritic chest pain, wheezing  CARDIOVASCULAR: negative for chest pain, dyspnea on exertion, orthopnea, paroxysmal nocturnal dyspnea, edema, palpitations  BREAST: negative for breast  tenderness, breast mass, nipple discharge, or skin changes  GASTROINTESTINAL: negative for abdominal pain, flank pain, nausea, vomiting, diarrhea, constipation, black stool, blood in stool  GENITOURINARY: negative for abnormal vaginal bleeding, amenorrhea, decreased libido, dysuria, genital sores, hematuria, incontinence, menorrhagia, pelvic pain, urinary frequency,  vaginal discharge  HEMATOLOGIC/LYMPHATIC: negative for swollen lymph nodes, bleeding, bruising  MUSCULOSKELETAL: muscle weakness, negative for back pain, joint pain, joint stiffness, joint swelling, muscle pain  NEUROLOGICAL: negative for dizzy/vertigo, headache, focal weakness, numbness/tingling, speech problems, loss of consciousness, confusion, memory loss  BEHAVORIAL/PSYCH: negative for anxiety, depression, psychosis  ENDOCRINE: negative for polydipsia/polyuria, palpitations, skin changes, temperature intolerance, unexpected weight changes  ALLERGIC/IMMUNOLOGIC: negative for urticaria, hay fever, angioedema      Objective:    Vital Signs:  Vitals:    04/05/22 1228   BP: 126/84   Pulse: 106   Resp: (Abnormal) 22       Physical Exam:  General:  alert, cooperative, no distress   Skin:  Skin color, texture, turgor normal. No rashes or lesions   HEENT:  extra ocular movement intact, sclera clear, anicteric   Neck: supple, trachea midline, no adenopathy or thyromegally   Respiratory:  Normal effort   Breasts:  status post mastectomy on the left, no discharge, erythema, tenderness, or palpable masses; no axillary lymphadenopathy   Abdomen:  soft, nontender, no palpable masses   Pelvis: External genitalia: normal general appearance  Urinary system: urethral meatus normal, bladder nontender  Vaginal: normal mucosa without prolapse or lesions, atrophic mucosa  Cervix: removed surgically  Uterus: removed surgically  Adnexa: removed surgically   Extremities: Normal ROM; no edema, no cyanosis   Neurologial: Normal strength and tone. No focal numbness or weakness.   Psychiatric: normal mood, speech, dress, and thought processes         Assessment:       Healthy female exam.     1. Well woman exam with routine gynecological exam    2. Encounter for routine gynecologic examination in Medicare patient    3. History of left breast cancer          Plan:      Well woman exam with routine gynecological exam    Encounter for routine  gynecologic examination in Medicare patient    History of left breast cancer          COUNSELING:  Willa Vigil was counseled on A.C.O.G. Pap guidelines and recommendations for yearly pelvic exams in addition to recommendations for monthly self breast exams; to see her PCP for other health maintenance.

## 2023-03-30 ENCOUNTER — TELEPHONE (OUTPATIENT)
Dept: OBSTETRICS AND GYNECOLOGY | Facility: CLINIC | Age: 88
End: 2023-03-30
Payer: MEDICARE

## 2023-03-30 DIAGNOSIS — Z12.31 BREAST CANCER SCREENING BY MAMMOGRAM: Primary | ICD-10-CM

## 2023-03-30 NOTE — TELEPHONE ENCOUNTER
----- Message from Yenny Conte MA sent at 3/30/2023 11:03 AM CDT -----  Contact: self  Willa Kim  MRN: [id number]  Home Phone      [phone]  Work Phone      Not on file.  Mobile          [phone]    Patient Care Team:  Rajiv Conte PA-C as PCP - General (Family Medicine)  OB? No  What phone number can you be reached at? [phone]  Message: Please link mammo orders to appt 6/20/23.          
Mammogram orders placed and linked.    
Alert/Awake/Cooperative

## 2023-07-26 ENCOUNTER — HOSPITAL ENCOUNTER (OUTPATIENT)
Dept: RADIOLOGY | Facility: HOSPITAL | Age: 88
Discharge: HOME | End: 2023-07-26
Attending: OBSTETRICS & GYNECOLOGY
Payer: MEDICARE

## 2023-07-26 ENCOUNTER — OFFICE VISIT (OUTPATIENT)
Dept: OBSTETRICS AND GYNECOLOGY | Facility: CLINIC | Age: 88
End: 2023-07-26
Payer: MEDICARE

## 2023-07-26 VITALS
SYSTOLIC BLOOD PRESSURE: 116 MMHG | HEART RATE: 62 BPM | BODY MASS INDEX: 32.54 KG/M2 | HEIGHT: 65 IN | WEIGHT: 195.31 LBS | DIASTOLIC BLOOD PRESSURE: 70 MMHG

## 2023-07-26 VITALS — BODY MASS INDEX: 29.82 KG/M2 | HEIGHT: 65 IN | WEIGHT: 179 LBS

## 2023-07-26 DIAGNOSIS — Z90.710 HISTORY OF TOTAL HYSTERECTOMY WITH BILATERAL SALPINGO-OOPHORECTOMY (BSO): ICD-10-CM

## 2023-07-26 DIAGNOSIS — Z12.31 BREAST CANCER SCREENING BY MAMMOGRAM: ICD-10-CM

## 2023-07-26 DIAGNOSIS — Z85.3 HISTORY OF CANCER OF LEFT BREAST: ICD-10-CM

## 2023-07-26 DIAGNOSIS — Z01.419 WELL WOMAN EXAM WITH ROUTINE GYNECOLOGICAL EXAM: Primary | ICD-10-CM

## 2023-07-26 DIAGNOSIS — Z90.722 HISTORY OF TOTAL HYSTERECTOMY WITH BILATERAL SALPINGO-OOPHORECTOMY (BSO): ICD-10-CM

## 2023-07-26 DIAGNOSIS — Z90.79 HISTORY OF TOTAL HYSTERECTOMY WITH BILATERAL SALPINGO-OOPHORECTOMY (BSO): ICD-10-CM

## 2023-07-26 PROCEDURE — 99999 PR PBB SHADOW E&M-EST. PATIENT-LVL II: ICD-10-PCS | Mod: PBBFAC,,, | Performed by: OBSTETRICS & GYNECOLOGY

## 2023-07-26 PROCEDURE — 99999 PR PBB SHADOW E&M-EST. PATIENT-LVL II: CPT | Mod: PBBFAC,,, | Performed by: OBSTETRICS & GYNECOLOGY

## 2023-07-26 PROCEDURE — G0101 PR CA SCREEN;PELVIC/BREAST EXAM: ICD-10-PCS | Mod: GZ,S$GLB,, | Performed by: OBSTETRICS & GYNECOLOGY

## 2023-07-26 PROCEDURE — 77067 SCR MAMMO BI INCL CAD: CPT | Mod: 26,52,, | Performed by: RADIOLOGY

## 2023-07-26 PROCEDURE — 1126F PR PAIN SEVERITY QUANTIFIED, NO PAIN PRESENT: ICD-10-PCS | Mod: CPTII,S$GLB,, | Performed by: OBSTETRICS & GYNECOLOGY

## 2023-07-26 PROCEDURE — 77063 BREAST TOMOSYNTHESIS BI: CPT | Mod: 26,52,, | Performed by: RADIOLOGY

## 2023-07-26 PROCEDURE — 77067 SCR MAMMO BI INCL CAD: CPT | Mod: TC,52

## 2023-07-26 PROCEDURE — G0101 CA SCREEN;PELVIC/BREAST EXAM: HCPCS | Mod: GZ,S$GLB,, | Performed by: OBSTETRICS & GYNECOLOGY

## 2023-07-26 PROCEDURE — 1159F MED LIST DOCD IN RCRD: CPT | Mod: CPTII,S$GLB,, | Performed by: OBSTETRICS & GYNECOLOGY

## 2023-07-26 PROCEDURE — 77063 MAMMO DIGITAL SCREENING RIGHT WITH TOMO: ICD-10-PCS | Mod: 26,52,, | Performed by: RADIOLOGY

## 2023-07-26 PROCEDURE — 1159F PR MEDICATION LIST DOCUMENTED IN MEDICAL RECORD: ICD-10-PCS | Mod: CPTII,S$GLB,, | Performed by: OBSTETRICS & GYNECOLOGY

## 2023-07-26 PROCEDURE — 1126F AMNT PAIN NOTED NONE PRSNT: CPT | Mod: CPTII,S$GLB,, | Performed by: OBSTETRICS & GYNECOLOGY

## 2023-07-26 PROCEDURE — 77067 MAMMO DIGITAL SCREENING RIGHT WITH TOMO: ICD-10-PCS | Mod: 26,52,, | Performed by: RADIOLOGY

## 2023-07-26 NOTE — PROGRESS NOTES
Subjective:    Patient ID: Willa Kim is a 88 y.o. y.o. female.     Chief Complaint: Annual Well Woman Exam     History of Present Illness:  Willa Vigil presents today for Annual Well Woman exam. She describes her menses as  absent, h/o hysterectomy and BSO .She denies pelvic pain.  She denies breast tenderness, masses, nipple discharge. She denies GYN complaints. She denies difficulty with urination or bowel movements.  She does admit to incontinence but does well with depends. She denies menopausal symptoms such as hotflashes, vaginal dryness, and night sweats. She denies bloating, early satiety, or weight changes. She is sexually active. .      Menstrual History:   No LMP recorded. Patient has had a hysterectomy..     OB History    : 3  Para: 3  Term: 2  : 1  Livin  Live Births: 2            The following portions of the patient's history were reviewed and updated as appropriate: allergies, current medications, past family history, past medical history, past social history, past surgical history, and problem list.    ROS:   CONSTITUTIONAL: Negative for fever, chills, diaphoresis, weakness, fatigue, weight loss, weight gain  ENT: negative for sore throat, nasal congestion, nasal discharge, epistaxis, tinnitus, hearing loss  EYES: negative for blurry vision, decreased vision, loss of vision, eye pain, diplopia, photophobia, discharge  SKIN: Negative for rash, itching, hives  RESPIRATORY: negative for cough, hemoptysis, shortness of breath, pleuritic chest pain, wheezing  CARDIOVASCULAR: negative for chest pain, dyspnea on exertion, orthopnea, paroxysmal nocturnal dyspnea, edema, palpitations  BREAST: negative for breast  tenderness, breast mass, nipple discharge, or skin changes  GASTROINTESTINAL: negative for abdominal pain, flank pain, nausea, vomiting, diarrhea, constipation, black stool, blood in stool  GENITOURINARY: incontinence, negative for abnormal vaginal bleeding,  amenorrhea, decreased libido, dysuria, genital sores, hematuria, , menorrhagia, pelvic pain, urinary frequency, vaginal discharge  HEMATOLOGIC/LYMPHATIC: negative for swollen lymph nodes, bleeding, bruising  MUSCULOSKELETAL: negative for back pain, joint pain, joint stiffness, joint swelling, muscle pain, muscle weakness  NEUROLOGICAL: negative for dizzy/vertigo, headache, focal weakness, numbness/tingling, speech problems, loss of consciousness, confusion, memory loss  BEHAVORIAL/PSYCH: negative for anxiety, depression, psychosis  ENDOCRINE: negative for polydipsia/polyuria, palpitations, skin changes, temperature intolerance, unexpected weight changes  ALLERGIC/IMMUNOLOGIC: negative for urticaria, hay fever, angioedema      Objective:    Vital Signs:  Vitals:    07/26/23 1513   BP: 116/70   Pulse: 62       Physical Exam:  General:  alert, cooperative, no distress   Skin:  Skin color, texture, turgor normal. No rashes or lesions   HEENT:  extra ocular movement intact, sclera clear, anicteric   Neck: supple, trachea midline, no adenopathy or thyromegally   Respiratory:  Normal effort   Breasts:  status post mastectomy on the left, no discharge, erythema, tenderness, or palpable masses; no axillary lymphadenopathy   Abdomen:  soft, nontender, no palpable masses   Pelvis: External genitalia: normal general appearance  Urinary system: urethral meatus normal, bladder nontender  Vaginal: atrophic mucosa  Cervix: removed surgically  Uterus: removed surgically  Adnexa: removed surgically   Extremities: Normal ROM; no edema, no cyanosis   Neurologial: Normal strength and tone. No focal numbness or weakness.   Psychiatric: normal mood, speech, dress, and thought processes         Assessment:       Healthy female exam.     1. Well woman exam with routine gynecological exam    2. History of total hysterectomy with bilateral salpingo-oophorectomy (BSO)    3. History of cancer of left breast          Plan:      Well woman exam  with routine gynecological exam    History of total hysterectomy with bilateral salpingo-oophorectomy (BSO)    History of cancer of left breast        MMG negative today    COUNSELING:  Willa Vigil was counseled on A.C.O.G. Pap guidelines and recommendations for yearly pelvic exams in addition to recommendations for monthly self breast exams; to see her PCP for other health maintenance.

## 2024-11-05 ENCOUNTER — OFFICE VISIT (OUTPATIENT)
Dept: OBSTETRICS AND GYNECOLOGY | Facility: CLINIC | Age: 89
End: 2024-11-05
Attending: PHYSICIAN ASSISTANT
Payer: MEDICARE

## 2024-11-05 ENCOUNTER — HOSPITAL ENCOUNTER (OUTPATIENT)
Dept: RADIOLOGY | Facility: HOSPITAL | Age: 89
Discharge: HOME | End: 2024-11-05
Attending: PHYSICIAN ASSISTANT
Payer: MEDICARE

## 2024-11-05 VITALS
WEIGHT: 196.75 LBS | DIASTOLIC BLOOD PRESSURE: 76 MMHG | HEART RATE: 60 BPM | HEIGHT: 65 IN | BODY MASS INDEX: 32.78 KG/M2 | SYSTOLIC BLOOD PRESSURE: 114 MMHG

## 2024-11-05 VITALS — WEIGHT: 195 LBS | BODY MASS INDEX: 32.49 KG/M2 | HEIGHT: 65 IN

## 2024-11-05 DIAGNOSIS — Z01.419 WELL WOMAN EXAM WITH ROUTINE GYNECOLOGICAL EXAM: Primary | ICD-10-CM

## 2024-11-05 DIAGNOSIS — R30.0 DYSURIA: ICD-10-CM

## 2024-11-05 DIAGNOSIS — Z12.31 ENCOUNTER FOR SCREENING MAMMOGRAM FOR BREAST CANCER: ICD-10-CM

## 2024-11-05 LAB
BILIRUB SERPL-MCNC: NORMAL MG/DL
BLOOD URINE, POC: NORMAL
CLARITY, POC UA: NORMAL
COLOR, POC UA: NORMAL
GLUCOSE UR QL STRIP: >=1000
KETONES UR QL STRIP: NORMAL
LEUKOCYTE ESTERASE URINE, POC: NORMAL
NITRITE, POC UA: NORMAL
PH, POC UA: 5.5
PROTEIN, POC: NORMAL
SPECIFIC GRAVITY, POC UA: 1.01
UROBILINOGEN, POC UA: 0.2

## 2024-11-05 PROCEDURE — 77067 SCR MAMMO BI INCL CAD: CPT | Mod: 26,52,, | Performed by: RADIOLOGY

## 2024-11-05 PROCEDURE — 77067 SCR MAMMO BI INCL CAD: CPT | Mod: TC,52

## 2024-11-05 PROCEDURE — 77063 BREAST TOMOSYNTHESIS BI: CPT | Mod: 26,52,, | Performed by: RADIOLOGY

## 2024-11-05 PROCEDURE — 99999 PR PBB SHADOW E&M-EST. PATIENT-LVL IV: CPT | Mod: PBBFAC,,, | Performed by: OBSTETRICS & GYNECOLOGY

## 2024-11-05 RX ORDER — DAPAGLIFLOZIN 5 MG/1
5 TABLET, FILM COATED ORAL
COMMUNITY
Start: 2024-07-12

## 2024-11-05 RX ORDER — TRIAMCINOLONE ACETONIDE 1 MG/G
CREAM TOPICAL 2 TIMES DAILY
COMMUNITY

## 2024-11-05 RX ORDER — ASCORBIC ACID 250 MG
TABLET,CHEWABLE ORAL
COMMUNITY

## 2024-11-05 RX ORDER — BLOOD SUGAR DIAGNOSTIC
STRIP MISCELLANEOUS
COMMUNITY
Start: 2024-08-28

## 2024-11-05 RX ORDER — GLIPIZIDE 10 MG/1
20 TABLET ORAL 2 TIMES DAILY
COMMUNITY
Start: 2024-09-28

## 2024-11-05 RX ORDER — LANCETS
EACH MISCELLANEOUS 2 TIMES DAILY
COMMUNITY
Start: 2024-08-27

## 2024-11-05 RX ORDER — LANCING DEVICE/LANCETS
KIT MISCELLANEOUS
COMMUNITY
Start: 2024-05-17

## 2024-11-05 RX ORDER — MULTIVITAMIN
1 TABLET ORAL DAILY
COMMUNITY

## 2024-11-05 RX ORDER — LORATADINE 10 MG/1
10 TABLET ORAL DAILY
COMMUNITY

## 2024-11-05 NOTE — PROGRESS NOTES
Subjective:    Patient ID: Willa Kim is a 89 y.o. y.o. female.     Chief Complaint: Annual Well Woman Exam     History of Present Illness:  Willa Vigil presents today for Annual Well Woman exam. She describes her menses as  absent; hysterectomy .She denies pelvic pain.  She denies breast tenderness, masses, nipple discharge. She denies GYN complaints. She denies difficulty with urination or bowel movements. She denies bloating, early satiety, or weight changes. She is not sexually active.       Menstrual History:   No LMP recorded. Patient has had a hysterectomy..     OB History          3    Para   3    Term   2       1    AB        Living   2         SAB        IAB        Ectopic        Multiple        Live Births   2                 The following portions of the patient's history were reviewed and updated as appropriate: allergies, current medications, past family history, past medical history, past social history, past surgical history, and problem list.    ROS:   Review of Systems   Constitutional:  Negative for chills, diaphoresis, fatigue, fever and unexpected weight change.   HENT:  Negative for congestion, hearing loss, postnasal drip, rhinorrhea, sinus pressure, sinus pain, sore throat and tinnitus.    Eyes:  Negative for pain, discharge and visual disturbance.   Respiratory:  Negative for apnea, cough, shortness of breath and wheezing.    Cardiovascular:  Negative for chest pain, palpitations and leg swelling.   Gastrointestinal:  Negative for abdominal pain, constipation, diarrhea, nausea and vomiting.   Endocrine: Negative for cold intolerance, heat intolerance, polydipsia, polyphagia and polyuria.   Genitourinary:  Positive for enuresis. Negative for difficulty urinating, dyspareunia, dysuria, flank pain, frequency, genital sores, hematuria, menstrual problem, pelvic pain, urgency, vaginal bleeding, vaginal discharge and vaginal pain.   Musculoskeletal:  Negative for  arthralgias, back pain, joint swelling, myalgias, neck pain and neck stiffness.   Skin:  Negative for color change, pallor and rash.   Allergic/Immunologic: Negative for environmental allergies, food allergies and immunocompromised state.   Neurological:  Negative for dizziness, weakness, light-headedness, numbness and headaches.   Hematological:  Negative for adenopathy. Does not bruise/bleed easily.   Psychiatric/Behavioral:  Negative for agitation and confusion. The patient is not nervous/anxious.          Objective:    Vital Signs:  Vitals:    11/05/24 1406   BP: 114/76   Pulse: 60       Physical Exam:   Examination performed with Chaperone present  General:  alert, cooperative, no distress   Skin:  Skin color, texture, turgor normal. No rashes or lesions   HEENT:  extra ocular movement intact, sclera clear, anicteric   Neck: supple, trachea midline, no adenopathy or thyromegally   Respiratory:  Normal effort   Breasts:  no discharge, erythema, tenderness, or palpable masses; no axillary lymphadenopathy   Abdomen:  soft, nontender, no palpable masses   Pelvis: External genitalia: normal general appearance  Urinary system: urethral meatus normal, bladder nontender  Vaginal: normal without tenderness, induration or masses, atrophic mucosa  Cervix: removed surgically  Uterus: removed surgically  Adnexa: removed surgically   Extremities: Normal ROM; no edema, no cyanosis   Neurologial: Normal strength and tone. No focal numbness or weakness.   Psychiatric: normal mood, speech, dress, and thought processes         Assessment:       Healthy female exam.     1. Well woman exam with routine gynecological exam    2. Dysuria          Plan:      Well woman exam with routine gynecological exam    Dysuria  -     POCT URINE DIPSTICK WITHOUT MICROSCOPE        MMG today negative    COUNSELING:  Willa Vigil was counseled on A.C.O.G. Pap guidelines and recommendations for yearly pelvic exams in addition to recommendations for  monthly self breast exams; to see her PCP for other health maintenance.

## 2024-12-18 ENCOUNTER — HOSPITAL ENCOUNTER (OUTPATIENT)
Dept: TELEMEDICINE | Facility: HOSPITAL | Age: 89
Discharge: HOME | End: 2024-12-18
Payer: MEDICARE

## 2024-12-18 PROCEDURE — 99447 NTRPROF PH1/NTRNET/EHR 11-20: CPT | Mod: ,,, | Performed by: STUDENT IN AN ORGANIZED HEALTH CARE EDUCATION/TRAINING PROGRAM

## 2024-12-18 NOTE — TELEMEDICINE CONSULT
[facility]  Vascular Neurology  Comprehensive Stroke Center  TeleVascular Neurology Interprofessional Consult Note           Consult Information  Consults    Consulting Provider: KAMLESH KATE   [facility] Location: [facility] - TELEMEDICINE ED [facility]RTC PATIENT FLOW CENTER     Summary of patient's symptoms:  88 yo female w/ PMHx of Afib on Xarelto, CHF, HTN, DM who presented to OSH 2 days prior due to worsening weakness x several days and diagnosed and admitted for treatment of UTI.   CTH on arrival day w/ noted subacute R MCA infarct as per treating physician - no follow up imaging or vessel imaging was completed.   LKW 03:00 AM  This morning, family and RN noted progressive LSW and LFD, which reportedly is new.      Images personally reviewed and interpreted:  Study: Not done at time of exam NO imaging available in pacs.        Assessment and plan:  Not a TNK candidate due to recent subacute infarct and full dose AC use.   Recommend Vessel imaging as well as repeat head imaging.   Facility is unable to complete CT due to machine being down.   MRI available - recommend MRI Brain and MRA H/N to evaluate the vasculature and the extent of subacute and possible acute infarct affecting the R hemisphere..     I spent approximately 15  minutes on this encounter. More than half of that time was spent communicating with the consulting provider and coordinating patient care.       Gladys Hernandez MD        This encounter was conducted as an interprofessional communication between providers at the spoke and vascular neurologist. The interaction was completed over the phone or via secure messaging (electronic medical record - [facility] Secure Chat).     Once this note was completed, a written copy was sent back to the provider via fax or electronic medical record.

## 2024-12-19 ENCOUNTER — HOSPITAL ENCOUNTER (INPATIENT)
Facility: HOSPITAL | Age: 89
LOS: 11 days | Discharge: NURSING, REHAB OR LONG-TERM CARE | DRG: 065 | End: 2024-12-30
Attending: PSYCHIATRY & NEUROLOGY | Admitting: STUDENT IN AN ORGANIZED HEALTH CARE EDUCATION/TRAINING PROGRAM
Payer: MEDICARE

## 2024-12-19 DIAGNOSIS — N39.0 UTI (URINARY TRACT INFECTION): ICD-10-CM

## 2024-12-19 DIAGNOSIS — I63.531 STROKE DUE TO OCCLUSION OF RIGHT POSTERIOR CEREBRAL ARTERY: Primary | ICD-10-CM

## 2024-12-19 DIAGNOSIS — I49.8 ATRIAL ARRHYTHMIA: ICD-10-CM

## 2024-12-19 DIAGNOSIS — I63.9 STROKE: ICD-10-CM

## 2024-12-19 LAB — POCT GLUCOSE: 217 MG/DL (ref 70–110)

## 2024-12-19 PROCEDURE — 11000001 HC ACUTE MED/SURG PRIVATE ROOM

## 2024-12-19 PROCEDURE — 63600175 PHARM REV CODE 636 W HCPCS

## 2024-12-19 PROCEDURE — 25000003 PHARM REV CODE 250: Performed by: STUDENT IN AN ORGANIZED HEALTH CARE EDUCATION/TRAINING PROGRAM

## 2024-12-19 RX ORDER — OXYBUTYNIN CHLORIDE 10 MG/1
10 TABLET, EXTENDED RELEASE ORAL DAILY
Status: DISCONTINUED | OUTPATIENT
Start: 2024-12-20 | End: 2024-12-31 | Stop reason: HOSPADM

## 2024-12-19 RX ORDER — FUROSEMIDE 20 MG/1
20 TABLET ORAL EVERY OTHER DAY
Status: DISCONTINUED | OUTPATIENT
Start: 2024-12-20 | End: 2024-12-25

## 2024-12-19 RX ORDER — BISACODYL 10 MG/1
10 SUPPOSITORY RECTAL DAILY PRN
Status: DISCONTINUED | OUTPATIENT
Start: 2024-12-19 | End: 2024-12-31 | Stop reason: HOSPADM

## 2024-12-19 RX ORDER — IBUPROFEN 200 MG
16 TABLET ORAL
Status: DISCONTINUED | OUTPATIENT
Start: 2024-12-19 | End: 2024-12-31 | Stop reason: HOSPADM

## 2024-12-19 RX ORDER — ATORVASTATIN CALCIUM 40 MG/1
40 TABLET, FILM COATED ORAL DAILY
Status: DISCONTINUED | OUTPATIENT
Start: 2024-12-20 | End: 2024-12-31 | Stop reason: HOSPADM

## 2024-12-19 RX ORDER — LABETALOL HCL 20 MG/4 ML
10 SYRINGE (ML) INTRAVENOUS
Status: DISCONTINUED | OUTPATIENT
Start: 2024-12-19 | End: 2024-12-31 | Stop reason: HOSPADM

## 2024-12-19 RX ORDER — METOPROLOL TARTRATE 50 MG/1
50 TABLET ORAL 2 TIMES DAILY
Status: DISCONTINUED | OUTPATIENT
Start: 2024-12-20 | End: 2024-12-27

## 2024-12-19 RX ORDER — INSULIN ASPART 100 [IU]/ML
0-5 INJECTION, SOLUTION INTRAVENOUS; SUBCUTANEOUS
Status: DISCONTINUED | OUTPATIENT
Start: 2024-12-19 | End: 2024-12-31 | Stop reason: HOSPADM

## 2024-12-19 RX ORDER — SODIUM CHLORIDE 0.9 % (FLUSH) 0.9 %
10 SYRINGE (ML) INJECTION
Status: DISCONTINUED | OUTPATIENT
Start: 2024-12-19 | End: 2024-12-31 | Stop reason: HOSPADM

## 2024-12-19 RX ORDER — GABAPENTIN 300 MG/1
300 CAPSULE ORAL 3 TIMES DAILY
Status: DISCONTINUED | OUTPATIENT
Start: 2024-12-20 | End: 2024-12-31 | Stop reason: HOSPADM

## 2024-12-19 RX ORDER — GLUCAGON 1 MG
1 KIT INJECTION
Status: DISCONTINUED | OUTPATIENT
Start: 2024-12-19 | End: 2024-12-31 | Stop reason: HOSPADM

## 2024-12-19 RX ORDER — POLYETHYLENE GLYCOL 3350 17 G/17G
17 POWDER, FOR SOLUTION ORAL DAILY PRN
Status: COMPLETED | OUTPATIENT
Start: 2024-12-19 | End: 2024-12-21

## 2024-12-19 RX ORDER — MUPIROCIN 20 MG/G
OINTMENT TOPICAL 2 TIMES DAILY
Status: COMPLETED | OUTPATIENT
Start: 2024-12-19 | End: 2024-12-24

## 2024-12-19 RX ORDER — IBUPROFEN 200 MG
24 TABLET ORAL
Status: DISCONTINUED | OUTPATIENT
Start: 2024-12-19 | End: 2024-12-31 | Stop reason: HOSPADM

## 2024-12-19 RX ADMIN — MUPIROCIN: 20 OINTMENT TOPICAL at 09:12

## 2024-12-19 RX ADMIN — INSULIN ASPART 1 UNITS: 100 INJECTION, SOLUTION INTRAVENOUS; SUBCUTANEOUS at 10:12

## 2024-12-19 NOTE — TELEMEDICINE CONSULT
[facility]  Vascular Neurology  Comprehensive Stroke Center  TeleVascular Neurology Interprofessional Consult Note           Consult Information  Consults    Consulting Provider: KAMLESH KATE   Patient Location: [facility] - TELEMEDICINE ED [facility]RTC PATIENT FLOW CENTER     Summary of patient's symptoms:  89 yo female w/ PMHx of Afib on Xarelto, CHF, HTN, DM with admission report of increased level of lethargy; with associated left sided weakness and gait instability over the last 24-48 hrs.       Images personally reviewed and interpreted:  Study: Head CT, MRI Brain, and MRA Brain & Neck  Study Interpretation: Moderate sized / right medial temporal - occipital territory infarct - involving lateral thalamus and adjacent capsular segments / with proximal P1 segment PCA occlusion; with negative findings for any basilar pathology.      Assessment and plan:  Embolic stroke involving right PCA vascular territory / right P1 PCA occlusion - No TNK - Out of window / No IR - completed infarct to the corresponding vascular territory      Recommend transfer for admission to vascular stroke unit / with close monitoring set-up for cerebral edema management as needed   - eunatremia / euglycemia and euthermia goals     - AC for secondary stroke prevention / post hyper-acute stroke phase - post stabilization of cerebral edema evolution   - target LDL < 70 / Continue atorvastatin  - permissive HTN x 72 hrs post index event / long term < 130/90  - Echo f/u     - PT/OT recs - discharge planning   - F/u PCP for risk factor modification monitoring  -  on DASH diet; exercise and lifestyle modifications when appropriate   - Provide stroke counseling during the visit - Identification of signs; emergency action and ER attention; Risk factor control, optimization for secondary stroke prevention; Compliance to medications     - F/u vascular neurology     I spent approximately 25 minutes on this  encounter. More than half of that time was spent communicating with the consulting provider and coordinating patient care.       Anderson Munoz MD        This encounter was conducted as an interprofessional communication between providers at the [facility] and vascular neurologist. The interaction was completed over the phone or via secure messaging (electronic medical record - [facility] Secure Chat).     Once this note was completed, a written copy was sent back to the provider via fax or electronic medical record.

## 2024-12-20 PROBLEM — I50.9 CHF (CONGESTIVE HEART FAILURE): Status: ACTIVE | Noted: 2024-12-20

## 2024-12-20 PROBLEM — I63.531 STROKE DUE TO OCCLUSION OF RIGHT POSTERIOR CEREBRAL ARTERY: Status: ACTIVE | Noted: 2024-12-20

## 2024-12-20 LAB
ALBUMIN SERPL BCP-MCNC: 2.8 G/DL (ref 3.5–5.2)
ALP SERPL-CCNC: 216 U/L (ref 40–150)
ALT SERPL W/O P-5'-P-CCNC: 109 U/L (ref 10–44)
ANION GAP SERPL CALC-SCNC: 9 MMOL/L (ref 8–16)
AST SERPL-CCNC: 65 U/L (ref 10–40)
AV AREA BY CONTINUOUS VTI: 2.4 CM2
AV INDEX (PROSTH): 0.78
AV LVOT MEAN GRADIENT: 6 MMHG
AV LVOT PEAK GRADIENT: 10 MMHG
AV MEAN GRADIENT: 7 MMHG
AV PEAK GRADIENT: 10.2 MMHG
AV VALVE AREA BY VELOCITY RATIO: 3.1 CM²
AV VALVE AREA: 2.5 CM2
AV VELOCITY RATIO: 1
BACTERIA #/AREA URNS AUTO: ABNORMAL /HPF
BASOPHILS # BLD AUTO: 0.05 K/UL (ref 0–0.2)
BASOPHILS NFR BLD: 0.5 % (ref 0–1.9)
BILIRUB SERPL-MCNC: 0.5 MG/DL (ref 0.1–1)
BILIRUB UR QL STRIP: NEGATIVE
BSA FOR ECHO PROCEDURE: 1.87 M2
BUN SERPL-MCNC: 23 MG/DL (ref 8–23)
CALCIUM SERPL-MCNC: 9 MG/DL (ref 8.7–10.5)
CHLORIDE SERPL-SCNC: 105 MMOL/L (ref 95–110)
CHOLEST SERPL-MCNC: 106 MG/DL (ref 120–199)
CHOLEST/HDLC SERPL: 2.9 {RATIO} (ref 2–5)
CLARITY UR REFRACT.AUTO: ABNORMAL
CO2 SERPL-SCNC: 23 MMOL/L (ref 23–29)
COLOR UR AUTO: ABNORMAL
CREAT SERPL-MCNC: 1.1 MG/DL (ref 0.5–1.4)
CV ECHO LV RWT: 0.6 CM
DIFFERENTIAL METHOD BLD: ABNORMAL
DOP CALC AO PEAK VEL: 1.6 M/S
DOP CALC AO VTI: 41.6 CM
DOP CALC LVOT AREA: 3.1 CM2
DOP CALC LVOT DIAMETER: 2 CM
DOP CALC LVOT PEAK VEL: 1.6 M/S
DOP CALC LVOT STROKE VOLUME: 102.1 CM3
DOP CALCLVOT PEAK VEL VTI: 32.5 CM
E WAVE DECELERATION TIME: 361.66 MS
E/A RATIO: 0.55
E/E' RATIO: 16.2 M/S
ECHO EF ESTIMATED: 57 %
ECHO LV POSTERIOR WALL: 1.2 CM (ref 0.6–1.1)
EOSINOPHIL # BLD AUTO: 0.2 K/UL (ref 0–0.5)
EOSINOPHIL NFR BLD: 2 % (ref 0–8)
ERYTHROCYTE [DISTWIDTH] IN BLOOD BY AUTOMATED COUNT: 12.2 % (ref 11.5–14.5)
EST. GFR  (NO RACE VARIABLE): 48.6 ML/MIN/1.73 M^2
ESTIMATED AVG GLUCOSE: 171 MG/DL (ref 68–131)
FRACTIONAL SHORTENING: 30 % (ref 28–44)
GLUCOSE SERPL-MCNC: 215 MG/DL (ref 70–110)
GLUCOSE UR QL STRIP: ABNORMAL
HBA1C MFR BLD: 7.6 % (ref 4–5.6)
HCT VFR BLD AUTO: 39.5 % (ref 37–48.5)
HDLC SERPL-MCNC: 37 MG/DL (ref 40–75)
HDLC SERPL: 34.9 % (ref 20–50)
HGB BLD-MCNC: 12.8 G/DL (ref 12–16)
HGB UR QL STRIP: ABNORMAL
HYALINE CASTS UR QL AUTO: 0 /LPF
IMM GRANULOCYTES # BLD AUTO: 0.02 K/UL (ref 0–0.04)
IMM GRANULOCYTES NFR BLD AUTO: 0.2 % (ref 0–0.5)
INTERVENTRICULAR SEPTUM: 1.1 CM (ref 0.6–1.1)
KETONES UR QL STRIP: ABNORMAL
LA MAJOR: 5.15 CM
LA MINOR: 5.61 CM
LA WIDTH: 3.59 CM
LDLC SERPL CALC-MCNC: 56.2 MG/DL (ref 63–159)
LEFT ATRIUM SIZE: 3.42 CM
LEFT ATRIUM VOLUME INDEX: 30.5 ML/M2
LEFT ATRIUM VOLUME: 56.04 CM3
LEFT INTERNAL DIMENSION IN SYSTOLE: 2.8 CM (ref 2.1–4)
LEFT VENTRICLE DIASTOLIC VOLUME INDEX: 38.03 ML/M2
LEFT VENTRICLE DIASTOLIC VOLUME: 69.98 ML
LEFT VENTRICLE MASS INDEX: 84.5 G/M2
LEFT VENTRICLE SYSTOLIC VOLUME INDEX: 16.5 ML/M2
LEFT VENTRICLE SYSTOLIC VOLUME: 30.32 ML
LEFT VENTRICULAR INTERNAL DIMENSION IN DIASTOLE: 4 CM (ref 3.5–6)
LEFT VENTRICULAR MASS: 155.4 G
LEUKOCYTE ESTERASE UR QL STRIP: ABNORMAL
LV LATERAL E/E' RATIO: 11.57
LV SEPTAL E/E' RATIO: 27
LYMPHOCYTES # BLD AUTO: 1.5 K/UL (ref 1–4.8)
LYMPHOCYTES NFR BLD: 16.3 % (ref 18–48)
MAGNESIUM SERPL-MCNC: 2.1 MG/DL (ref 1.6–2.6)
MCH RBC QN AUTO: 31.6 PG (ref 27–31)
MCHC RBC AUTO-ENTMCNC: 32.4 G/DL (ref 32–36)
MCV RBC AUTO: 98 FL (ref 82–98)
MICROSCOPIC COMMENT: ABNORMAL
MONOCYTES # BLD AUTO: 0.7 K/UL (ref 0.3–1)
MONOCYTES NFR BLD: 7.2 % (ref 4–15)
MV PEAK A VEL: 1.48 M/S
MV PEAK E VEL: 0.81 M/S
NEUTROPHILS # BLD AUTO: 6.9 K/UL (ref 1.8–7.7)
NEUTROPHILS NFR BLD: 73.8 % (ref 38–73)
NITRITE UR QL STRIP: NEGATIVE
NONHDLC SERPL-MCNC: 69 MG/DL
NRBC BLD-RTO: 0 /100 WBC
OHS CV RV/LV RATIO: 0.75 CM
OHS QRS DURATION: 102 MS
OHS QTC CALCULATION: 470 MS
PH UR STRIP: 5 [PH] (ref 5–8)
PHOSPHATE SERPL-MCNC: 3.4 MG/DL (ref 2.7–4.5)
PLATELET # BLD AUTO: 218 K/UL (ref 150–450)
PMV BLD AUTO: 10.8 FL (ref 9.2–12.9)
POTASSIUM SERPL-SCNC: 3.8 MMOL/L (ref 3.5–5.1)
PROT SERPL-MCNC: 6.2 G/DL (ref 6–8.4)
PROT UR QL STRIP: ABNORMAL
RA MAJOR: 5.55 CM
RA PRESSURE ESTIMATED: 3 MMHG
RA WIDTH: 3.48 CM
RBC # BLD AUTO: 4.05 M/UL (ref 4–5.4)
RBC #/AREA URNS AUTO: >100 /HPF (ref 0–4)
RIGHT VENTRICLE DIASTOLIC BASEL DIMENSION: 3 CM
SINUS: 3.1 CM
SODIUM SERPL-SCNC: 137 MMOL/L (ref 136–145)
SP GR UR STRIP: 1.02 (ref 1–1.03)
STJ: 2.59 CM
TDI LATERAL: 0.07 M/S
TDI SEPTAL: 0.03 M/S
TDI: 0.05 M/S
TRICUSPID ANNULAR PLANE SYSTOLIC EXCURSION: 1.56 CM
TRIGL SERPL-MCNC: 64 MG/DL (ref 30–150)
URN SPEC COLLECT METH UR: ABNORMAL
WBC # BLD AUTO: 9.31 K/UL (ref 3.9–12.7)
WBC #/AREA URNS AUTO: >100 /HPF (ref 0–5)
YEAST UR QL AUTO: ABNORMAL
Z-SCORE OF LEFT VENTRICULAR DIMENSION IN END DIASTOLE: -2.44
Z-SCORE OF LEFT VENTRICULAR DIMENSION IN END SYSTOLE: -0.93

## 2024-12-20 PROCEDURE — 92610 EVALUATE SWALLOWING FUNCTION: CPT

## 2024-12-20 PROCEDURE — 93010 ELECTROCARDIOGRAM REPORT: CPT | Mod: ,,, | Performed by: INTERNAL MEDICINE

## 2024-12-20 PROCEDURE — 97112 NEUROMUSCULAR REEDUCATION: CPT

## 2024-12-20 PROCEDURE — 80061 LIPID PANEL: CPT | Performed by: STUDENT IN AN ORGANIZED HEALTH CARE EDUCATION/TRAINING PROGRAM

## 2024-12-20 PROCEDURE — 83036 HEMOGLOBIN GLYCOSYLATED A1C: CPT | Performed by: STUDENT IN AN ORGANIZED HEALTH CARE EDUCATION/TRAINING PROGRAM

## 2024-12-20 PROCEDURE — 97162 PT EVAL MOD COMPLEX 30 MIN: CPT

## 2024-12-20 PROCEDURE — 87086 URINE CULTURE/COLONY COUNT: CPT

## 2024-12-20 PROCEDURE — 80053 COMPREHEN METABOLIC PANEL: CPT

## 2024-12-20 PROCEDURE — 81001 URINALYSIS AUTO W/SCOPE: CPT

## 2024-12-20 PROCEDURE — 36415 COLL VENOUS BLD VENIPUNCTURE: CPT | Performed by: STUDENT IN AN ORGANIZED HEALTH CARE EDUCATION/TRAINING PROGRAM

## 2024-12-20 PROCEDURE — 97530 THERAPEUTIC ACTIVITIES: CPT

## 2024-12-20 PROCEDURE — 25000003 PHARM REV CODE 250: Performed by: STUDENT IN AN ORGANIZED HEALTH CARE EDUCATION/TRAINING PROGRAM

## 2024-12-20 PROCEDURE — 85025 COMPLETE CBC W/AUTO DIFF WBC: CPT

## 2024-12-20 PROCEDURE — 97166 OT EVAL MOD COMPLEX 45 MIN: CPT

## 2024-12-20 PROCEDURE — 11000001 HC ACUTE MED/SURG PRIVATE ROOM

## 2024-12-20 PROCEDURE — 83735 ASSAY OF MAGNESIUM: CPT

## 2024-12-20 PROCEDURE — 25000003 PHARM REV CODE 250

## 2024-12-20 PROCEDURE — 84100 ASSAY OF PHOSPHORUS: CPT

## 2024-12-20 PROCEDURE — 97535 SELF CARE MNGMENT TRAINING: CPT

## 2024-12-20 PROCEDURE — 92523 SPEECH SOUND LANG COMPREHEN: CPT

## 2024-12-20 PROCEDURE — 93005 ELECTROCARDIOGRAM TRACING: CPT

## 2024-12-20 PROCEDURE — 99223 1ST HOSP IP/OBS HIGH 75: CPT | Mod: GC,,, | Performed by: STUDENT IN AN ORGANIZED HEALTH CARE EDUCATION/TRAINING PROGRAM

## 2024-12-20 PROCEDURE — 63600175 PHARM REV CODE 636 W HCPCS

## 2024-12-20 RX ORDER — PANTOPRAZOLE SODIUM 20 MG/1
40 TABLET, DELAYED RELEASE ORAL EVERY MORNING
Status: DISCONTINUED | OUTPATIENT
Start: 2024-12-20 | End: 2024-12-31 | Stop reason: HOSPADM

## 2024-12-20 RX ORDER — CIPROFLOXACIN 250 MG/1
250 TABLET, FILM COATED ORAL EVERY 12 HOURS
Status: COMPLETED | OUTPATIENT
Start: 2024-12-20 | End: 2024-12-26

## 2024-12-20 RX ORDER — ACETAMINOPHEN 325 MG/1
650 TABLET ORAL EVERY 6 HOURS PRN
Status: CANCELLED | OUTPATIENT
Start: 2024-12-20

## 2024-12-20 RX ORDER — ACETAMINOPHEN 325 MG/1
650 TABLET ORAL EVERY 6 HOURS PRN
Status: DISCONTINUED | OUTPATIENT
Start: 2024-12-20 | End: 2024-12-31 | Stop reason: HOSPADM

## 2024-12-20 RX ADMIN — ACETAMINOPHEN 650 MG: 325 TABLET ORAL at 11:12

## 2024-12-20 RX ADMIN — MUPIROCIN: 20 OINTMENT TOPICAL at 09:12

## 2024-12-20 RX ADMIN — METOPROLOL TARTRATE 50 MG: 50 TABLET, FILM COATED ORAL at 08:12

## 2024-12-20 RX ADMIN — METOPROLOL TARTRATE 50 MG: 50 TABLET, FILM COATED ORAL at 09:12

## 2024-12-20 RX ADMIN — GABAPENTIN 300 MG: 300 CAPSULE ORAL at 08:12

## 2024-12-20 RX ADMIN — OXYBUTYNIN CHLORIDE 10 MG: 10 TABLET, EXTENDED RELEASE ORAL at 09:12

## 2024-12-20 RX ADMIN — FUROSEMIDE 20 MG: 20 TABLET ORAL at 11:12

## 2024-12-20 RX ADMIN — GABAPENTIN 300 MG: 300 CAPSULE ORAL at 02:12

## 2024-12-20 RX ADMIN — GABAPENTIN 300 MG: 300 CAPSULE ORAL at 09:12

## 2024-12-20 RX ADMIN — CIPROFLOXACIN HYDROCHLORIDE 250 MG: 250 TABLET, FILM COATED ORAL at 11:12

## 2024-12-20 RX ADMIN — INSULIN ASPART 1 UNITS: 100 INJECTION, SOLUTION INTRAVENOUS; SUBCUTANEOUS at 08:12

## 2024-12-20 RX ADMIN — MUPIROCIN: 20 OINTMENT TOPICAL at 08:12

## 2024-12-20 RX ADMIN — CIPROFLOXACIN HYDROCHLORIDE 250 MG: 250 TABLET, FILM COATED ORAL at 08:12

## 2024-12-20 RX ADMIN — ATORVASTATIN CALCIUM 40 MG: 40 TABLET, FILM COATED ORAL at 09:12

## 2024-12-20 RX ADMIN — PANTOPRAZOLE SODIUM 40 MG: 20 TABLET, DELAYED RELEASE ORAL at 09:12

## 2024-12-20 NOTE — HPI
Ms. Willa Kim is a 89 y.o. patient transferred from [facility] for close monitoring of right PCA (P1) occlusion. History of afib on Xarelto, CHF (EF 60%), HTN, DM. Presented to OSH with LSW, LFD. Telestroke consult performed, no baseline NIHSS documented. CTH with subacute right MCA infarct; no follow up CTA done. MRI brain with right medial temporo-occipital territory infarct (lateral thalamus) with proximal P1 segment PCA occlusion. OOW for TNK, not IR candidate. Alert, following commands. Initial NIHSS 9 upon arrival to [facility] with LSW, dysarthria since this morning and some difficulty crossing midline.

## 2024-12-20 NOTE — ASSESSMENT & PLAN NOTE
Holding home Xarelto. Will consider AC timing to resume while in the hospital; previously on amiodarone and diltiazem, which the patient is not taking anymore

## 2024-12-20 NOTE — ASSESSMENT & PLAN NOTE
Ms. Willa Kim is a 89 y.o. patient transferred from OSH for right PCA (P1) occlusion monitoring. History of afib on Xarelto, CHF (EF 60%), HTN, DM. Presented with LSW. No baseline NIHSS documented from telestroke. CTH with subacute right MCA infarct; no follow up CTA done. MRI brain with right medial temporo-occipital territory infarct (lateral thalamus) with proximal P1 segment PCA occlusion. OOW for TNK, not IR candidate. NIHSS 9 with LSW and not fully crossing midline. Daughter noticed some dysarthria this AM. Suspected embolic/cardio-embolic etiology. Given some evidence of cerebral atrophy, risk of malignant cerebral edema is low but patient should be closely monitored.    Antithrombotics for secondary stroke prevention:  AC timing to be determined on 12/20/24     Statins for secondary stroke prevention and hyperlipidemia, if present:   Statins: Atorvastatin- 40 mg daily    Aggressive risk factor modification: HTN, A-Fib     Rehab efforts: The patient has been evaluated by a stroke team provider and the therapy needs have been fully considered based off the presenting complaints and exam findings. The following therapy evaluations are needed: PT evaluate and treat, OT evaluate and treat, SLP evaluate and treat, PM&R evaluate for appropriate placement, when able     Diagnostics ordered/pending: HgbA1C to assess blood glucose levels, Lipid Profile to assess cholesterol levels, TTE to assess cardiac function/status , TSH to assess thyroid function    VTE prophylaxis: Mechanical prophylaxis: Place SCDs; pending timing to start AC     BP parameters: Infarct: No intervention, SBP <220    Other goals: maintain eunatremia and normothermia

## 2024-12-20 NOTE — CARE UPDATE
Patient's chart was reviewed by a stroke team provider.    Repeating CT head for stability and to determine restart date for anticoagulation. Resuming cipro for treatment of UTI, urine cultures pending        Dara Hutchins PA-C  Vascular Neurology  [phone]

## 2024-12-20 NOTE — PT/OT/SLP EVAL
Occupational Therapy  Co- Evaluation    Name: Willa Kim  MRN: [id number]  Admitting Diagnosis: Stroke due to occlusion of right posterior cerebral artery  Recent Surgery: * No surgery found *      Recommendations:     Discharge Recommendations: Moderate Intensity Therapy  Discharge Equipment Recommendations:  none  Barriers to discharge:  None    Assessment:     Willa Kim is a 89 y.o. female with a medical diagnosis of Stroke due to occlusion of right posterior cerebral artery.  She presents with decrease functional status secondary to medical diagnosis. Performance deficits affecting function: weakness, impaired functional mobility, impaired cognition, impaired coordination, impaired fine motor, gait instability, impaired endurance, impaired balance, decreased upper extremity function, decreased lower extremity function, impaired self care skills, decreased ROM. Patient with fair tolerance to OT session limited by increased fatigue. Patient presenting with LUE weakness presenting at a 3/5 throughout impairing patient ability to complete ADL self care tasks. Patient requiring significant assistance for standing at this time completing ~25% hip clearance with total x2 assistance. Patient is therefore considered a high fall risk and is unable to complete independent ADL activity at this time. Patient is therefore appropriate for acute OT services to increase patient self care performance and functional mobility. Following DC from [facility] patient should continue with a moderate intensity therapy to ensure patient safety and promote return to independence.     Rehab Prognosis: Good; patient would benefit from acute skilled OT services to address these deficits and reach maximum level of function.       Plan:     Patient to be seen   to address the above listed problems via self-care/home management, therapeutic exercises, therapeutic activities, neuromuscular re-education  Plan of Care Expires:  "01/20/25  Plan of Care Reviewed with: patient    Subjective     Chief Complaint: "I am so tired:"  Patient/Family Comments/goals: DC     Occupational Profile:  Living Environment: Patient lives alone in a SSH with no YVES. WIS with grab bars.   Previous level of function: Mod Independent with walker   Roles and Routines: Patient largely sedentary   Equipment Used at Home: walker, rolling, walker, standard, grab bar  Assistance upon Discharge: Daughter (not 24/7)    Patients cultural, spiritual, Congregation conflicts given the current situation: no    Objective:     Communicated with: RN prior to session.  Patient found supine with   upon OT entry to room.    General Precautions: Standard, fall  Orthopedic Precautions: N/A  Braces: N/A  Respiratory Status: Room air    Occupational Performance:    Bed Mobility:    Patient completed Supine to Sit with total assistance and 2 persons  Patient completed Sit to Supine with total assistance and 2 persons    Functional Mobility/Transfers:  Patient completed Sit <> Stand Transfer with total assistance and of 2 persons  with  hand-held assist ; patient completed 25% hip clearance; patient needing increased cueing for gluteal activation and upright stance     Activities of Daily Living:  Lower Body Dressing: maximal assistance to don socks supine in bed     Cognitive/Visual Perceptual:  Cognitive/Psychosocial Skills:     -       Oriented to: Person, Place, Time, and Situation   -       Follows Commands/attention:Follows multistep  commands  -       Communication: clear/fluent  -       Safety awareness/insight to disability: intact   Visual/Perceptual:      -Intact      Physical Exam:  Upper Extremity Range of Motion:     -       Right Upper Extremity: WFL  -       Left Upper Extremity: Proximally limited   Upper Extremity Strength:    -       Right Upper Extremity: WFL  -       Left Upper Extremity: 3/5 throughout    Strength: -       Right Upper Extremity: WFL  -       Left " Upper Extremity: significantly decreased  Fine Motor Coordination: LUE impairment    AMPAC 6 Click ADL:  AMPAC Total Score: 12    Treatment & Education:  Co-Treatment conducted due to patient medical instability and to promote patient safety. Provided education regarding role of OT, POC, & discharge recommendations.  Pt with no further questions/concerns at this time. OT provided education on home recommendations and fall prevention in preparation for D/C.     Patient left supine with all lines intact and call button in reach    GOALS:   Multidisciplinary Problems       Occupational Therapy Goals          Problem: Occupational Therapy    Goal Priority Disciplines Outcome Interventions   Occupational Therapy Goal     OT, PT/OT Progressing    Description: Goals to be met by: 1/20/24     Patient will increase functional independence with ADLs by performing:    UE Dressing with Moderate Assistance.  LE Dressing with Moderate Assistance.  Grooming while EOB with Minimal Assistance.  Toileting from bedside commode with Maximum Assistance for hygiene and clothing management.   Sitting at edge of bed x10 minutes with Supervision.  Supine to sit with Supervision.  Stand pivot transfers with Maximum Assistance.  Step transfer with Maximum Assistance  Toilet transfer to bedside commode with Maximum Assistance.                         History:     Past Medical History:   Diagnosis Date    A-fib     Anticoagulant long-term use     Arthritis     ASCVD (arteriosclerotic cardiovascular disease)     Breast cancer @ 37y/o    Left breast    Bronchitis     CKD (chronic kidney disease)     Coronary artery disease     Diabetes mellitus     -150    Diverticulosis     Elevated cholesterol     Encounter for blood transfusion     GERD (gastroesophageal reflux disease)     High cholesterol     History of mammogram 10/2/12    Normal    History of small bowel obstruction     Hypertension     Internal hemorrhoids     Mitral valve disorder      YANIQUE (obstructive sleep apnea)     NO C PAP USE    Osteoarthritis     Osteoporosis, unspecified     PSVT (paroxysmal supraventricular tachycardia)     SBO (small bowel obstruction)     UTI (urinary tract infection)     Wears dentures          Past Surgical History:   Procedure Laterality Date    APPENDECTOMY      BACK SURGERY      CATARACT EXTRACTION W/ INTRAOCULAR LENS  IMPLANT, BILATERAL      CERVICAL FUSION      CHOLECYSTECTOMY      COLON SURGERY      COLON RESECT    COLONOSCOPY N/A 6/5/2019    Procedure: SCREENING COLONOSCOPY;  Surgeon: Matt Camacho MD;  Location: [facility];  Service: Endoscopy;  Laterality: N/A;    CYSTOSCOPY      history breast cancer      HYSTERECTOMY      DILLON --bleeding/fibroids    JOINT REPLACEMENT Right     knee    KNEE ARTHROSCOPY Right     MASTECTOMY  @ 37y/o    Left Breast    OOPHORECTOMY      BSO    OOPHORECTOMY         Time Tracking:     OT Date of Treatment: 12/20/24  OT Start Time: 1009  OT Stop Time: 1029  OT Total Time (min): 20 min    Billable Minutes:Evaluation 10  Neuromuscular Re-education 10    12/20/2024

## 2024-12-20 NOTE — PT/OT/SLP EVAL
Physical Therapy Co-Evaluation and Co-Treatment    Patient Name:  Willa Kim   MRN:  [id number]    Co-evaluation and co-treatment performed for this visit due to suspected patient need for two skilled therapists to ensure patient and staff safety and to accommodate for patient activity tolerance/pain management     Recommendations:     Discharge Recommendations: Moderate Intensity Therapy  Discharge Equipment Recommendations: hospital bed, lift device, shower chair   Barriers to Discharge: Increased level of assist and Decreased caregiver support  Safest Mobility Level with Nursing: Bed Level ROM - Pt only safe for OOB mobility with therapy staff at this time    Assessment:     Willa Kim is a 89 y.o. 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 She presents with the following impairments/functional limitations: weakness, impaired endurance, impaired self care skills, impaired functional mobility, gait instability, impaired balance, decreased safety awareness, decreased lower extremity function, decreased upper extremity function, pain, impaired fine motor, decreased coordination, impaired cognition. Pt presenting with HOB elevated and agreeable to completing therapy evaluation. Pt with decreased activity tolerance, impaired postural stability, LLE weakness, and decreased insight to current functional deficits. Pt currently requiring significant assistance to complete all visualized functional mobility placing pt at increased risk for falls. PTA, pt modified independent for mobility using RW; however, daughter reporting pt is primarily sedentary throughout day. Recommend moderate intensity therapy following discharge once medically stable in order to reduce fall risk, reduce caregiver burden, improve quality of life, and maximize functional independence. Pt would continue to benefit from skilled acute PT in order to address current deficits  "and progress functional mobility.     Rehab Prognosis: Good; patient would benefit from acute skilled PT services 4 x/week to address these deficits and reach maximum level of function.  Recent Surgery: * No surgery found *      Plan:     During this hospitalization, patient to be seen 4 x/week to address the identified rehab impairments via gait training, therapeutic activities, therapeutic exercises, neuromuscular re-education and progress toward the following goals:    Plan of Care Expires:  01/20/25    Subjective     Chief Complaint: None verbalized  Patient/Family Comments/Goals: "Don't rough me around."  Pain/Comfort:  Pain Rating 1: 0/10    Patients cultural, spiritual, Ep[employer] conflicts given the current situation: no    Living Environment:  Living Environment: Patient lives alone in a single story house with number of outside stair(s): 0 and walk-in shower.  Prior Level of Function: Prior to admission, patient modified independent for mobility using RW and ADL completion.  Equipment Used at Home: walker, rolling, wheelchair, bedside commode.  DME owned (not currently used): none  Assistance Upon Discharge: limited from daughter, cannot be there 24/7    Objective:     Communicated with nursing prior to session. Patient found HOB elevated with bed alarm, telemetry, PureWick upon PT entry to room.    General Precautions: Standard, fall, aspiration  Orthopedic Precautions:N/A    Braces: N/A    Exams:  Cognitive Exam:  Patient is oriented to Person, Place, Time, Situation, follows commands 100% of the time  RLE ROM: WFL  RLE Strength: WFL, grossly 4/5  LLE ROM: WFL  LLE Strength: Grossly 2/5  Sensation: Intact light touch to BLEs    Functional Mobility:  Bed Mobility:  Verbal cues for sequencing and technique  Supine to Sit: total assistance of 2 persons for LE management and trunk management  Sit to Supine: total assistance of 2 persons for LE management and trunk management  Transfers:    Sit to Stand: " total assistance of 2 persons with no AD with cues for hand placement and foot placement  Verbal cues for increased use of momentum, improved anterior weight shift, and increased BLE motor activation  <25% of hip clearance noted  Balance:   Static Sitting: Poor, able to maintain for 10 minute(s) with maximal assistance  Verbal and tactile cues provided for upright posture, increased cervical extension, maintenance of midline orientation, anterior trunk lean, core activation, command following, and improved used of BUE for support.  Dynamic Sitting: Poor: Patient unable to accept challenge or move without loss of balance, maximal assistance  Static Standing: Poor, able to maintain for 5 seconds with total assistance of 2 persons  Verbal cues for upright posture, increased hip extension, increased knee extension, and maintenance of midline orientation  Dynamic Standing: not assessed this visit    Therapeutic Activities and Exercises:  Patient educated on role of acute care PT and PT POC, safety while in hospital including calling nurse for mobility, and call light usage.  Pt educated on importance of maximal participation in therapy session in order to reduce negative effects of prolonged sedentary positioning.   Answered all questions within PT scope of practice and addressed functional mobility concerns.    AM-PAC 6 CLICK MOBILITY  Total Score:6     Patient left HOB elevated with all lines intact, call button in reach, RN notified, bed alarm on, and daughter present.    GOALS:   Multidisciplinary Problems       Physical Therapy Goals          Problem: Physical Therapy    Goal Priority Disciplines Outcome Interventions   Physical Therapy Goal     PT, PT/OT Progressing    Description: Goals to be met by: 2025     Patient will increase functional independence with mobility by performin. Supine to sit with Moderate Assistance  2. Sit to supine with Moderate Assistance  3. Sit to stand transfer with Maximum  Assistance  4. Bed to chair transfer with Maximum Assistance using LRAD  5. Gait  x 5 feet with Maximum Assistance using LRAD.   6. Sitting at edge of bed x5 minutes with Minimal Assistance  7. Lower extremity exercise program x15 reps per handout, with assistance as needed    Hospital Bed - Patient requires a hospital bed for positioning of the body in ways that are not feasible with an ordinary bed. The patient requires special positioning for pain relief, limited mobility, and/or being unable to independently make changes in body position without the use of a hospital bed. Pillows and wedges will not be adequate for resolving these positional issues.                            History:     Past Medical History:   Diagnosis Date    A-fib     Anticoagulant long-term use     Arthritis     ASCVD (arteriosclerotic cardiovascular disease)     Breast cancer @ 35y/o    Left breast    Bronchitis     CKD (chronic kidney disease)     Coronary artery disease     Diabetes mellitus     -150    Diverticulosis     Elevated cholesterol     Encounter for blood transfusion     GERD (gastroesophageal reflux disease)     High cholesterol     History of mammogram 10/2/12    Normal    History of small bowel obstruction     Hypertension     Internal hemorrhoids     Mitral valve disorder     YANIQUE (obstructive sleep apnea)     NO C PAP USE    Osteoarthritis     Osteoporosis, unspecified     PSVT (paroxysmal supraventricular tachycardia)     SBO (small bowel obstruction)     UTI (urinary tract infection)     Wears dentures        Past Surgical History:   Procedure Laterality Date    APPENDECTOMY      BACK SURGERY      CATARACT EXTRACTION W/ INTRAOCULAR LENS  IMPLANT, BILATERAL      CERVICAL FUSION      CHOLECYSTECTOMY      COLON SURGERY      COLON RESECT    COLONOSCOPY N/A 6/5/2019    Procedure: SCREENING COLONOSCOPY;  Surgeon: Matt Camacho MD;  Location: [facility];  Service: Endoscopy;  Laterality: N/A;    CYSTOSCOPY       history breast cancer      HYSTERECTOMY      DILLON --bleeding/fibroids    JOINT REPLACEMENT Right     knee    KNEE ARTHROSCOPY Right     MASTECTOMY  @ 37y/o    Left Breast    OOPHORECTOMY      BSO    OOPHORECTOMY         Time Tracking:     PT Received On: 12/20/24  PT Start Time: 1008     PT Stop Time: 1029  PT Total Time (min): 21 min     Billable Minutes: Evaluation 10 Therapeutic Activity 11      12/20/2024

## 2024-12-20 NOTE — PT/OT/SLP EVAL
Speech Language Pathology Evaluation  Cognitive/Bedside Swallow    Patient Name:  Willa Kim   MRN:  [id number]  Admitting Diagnosis: Stroke due to occlusion of right posterior cerebral artery    Recommendations:                  General Recommendations:  Speech/language therapy, Cognitive-linguistic therapy, and diet check  Diet recommendations:  Regular Diet - IDDSI Level 7, Thin liquids - IDDSI Level 0   Aspiration Precautions: Assistance with meals, Feed only when awake/alert, HOB to 90 degrees, Meds whole 1 at a time, Small bites/sips, and Standard aspiration precautions   General Precautions: Standard, aspiration, fall  Communication strategies:  provide increased time to answer    Assessment:     Willa Kim is a 89 y.o. female with an SLP diagnosis of Aphasia and Cognitive-Linguistic Impairment.  She presents with a functional oropharyngeal swallow.    History:     Past Medical History:   Diagnosis Date    A-fib     Anticoagulant long-term use     Arthritis     ASCVD (arteriosclerotic cardiovascular disease)     Breast cancer @ 37y/o    Left breast    Bronchitis     CKD (chronic kidney disease)     Coronary artery disease     Diabetes mellitus     -150    Diverticulosis     Elevated cholesterol     Encounter for blood transfusion     GERD (gastroesophageal reflux disease)     High cholesterol     History of mammogram 10/2/12    Normal    History of small bowel obstruction     Hypertension     Internal hemorrhoids     Mitral valve disorder     YANIQUE (obstructive sleep apnea)     NO C PAP USE    Osteoarthritis     Osteoporosis, unspecified     PSVT (paroxysmal supraventricular tachycardia)     SBO (small bowel obstruction)     UTI (urinary tract infection)     Wears dentures        Past Surgical History:   Procedure Laterality Date    APPENDECTOMY      BACK SURGERY      CATARACT EXTRACTION W/ INTRAOCULAR LENS  IMPLANT, BILATERAL      CERVICAL FUSION      CHOLECYSTECTOMY      COLON SURGERY       COLON RESECT    COLONOSCOPY N/A 6/5/2019    Procedure: SCREENING COLONOSCOPY;  Surgeon: Matt Camacho MD;  Location: [facility];  Service: Endoscopy;  Laterality: N/A;    CYSTOSCOPY      history breast cancer      HYSTERECTOMY      DILLON --bleeding/fibroids    JOINT REPLACEMENT Right     knee    KNEE ARTHROSCOPY Right     MASTECTOMY  @ 35y/o    Left Breast    OOPHORECTOMY      BSO    OOPHORECTOMY       HPI: Stroke due to occlusion of right posterior cerebral artery  Ms. Willa Kim is a 89 y.o. patient transferred from OSH for right PCA (P1) occlusion monitoring. History of afib on Xarelto, CHF (EF 60%), HTN, DM. Presented with LSW. No baseline NIHSS documented from telestroke. CTH with subacute right MCA infarct; no follow up CTA done. MRI brain with right medial temporo-occipital territory infarct (lateral thalamus) with proximal P1 segment PCA occlusion. OOW for TNK, not IR candidate. NIHSS 9 with LSW and not fully crossing midline. Daughter noticed some dysarthria this AM. Suspected embolic/cardio-embolic etiology. Given some evidence of cerebral atrophy, risk of malignant cerebral edema is low but patient should be closely monitored.    Social History: Patient lives alone. Daughter reports recent concern for pt needs some assistance. Pt received meals from a service. Daughter reports pt with poor memory top remember to administer medications.     Prior Intubation HX:  none    Modified Barium Swallow: none    Chest X-Rays: none    Prior diet: regular/thin.    Subjective     Pt awake and alert with daughter present at bedside. Pt agreeable to participate in session. Nursing cleared.     Pain/Comfort:  Pain Rating 1: 0/10    Respiratory Status: Room air    Objective:     Cognitive Status:    Arousal/Alertness Appropriate response to stimuli  Attention No obvious deficits observed   Orientation Person, Situation, and Time  Memory Delayedpt recalling 3/3 unrelated words given semantic cues  Problem  Solving Solutions 3/3  Reasoning 3/4     Receptive Language:   Comprehension:      Questions Complex yes/no 50%  Commands  two step basic commands 100%  multistep basic commands 1/3 given repetitions   Conversation WFL although pt with limited verbal output    Pragmatics:    Intermittent eye contact vs closing eyes    Expressive Language:  Verbal:    Naming Confrontation identified 2/5 objects in room independently, increasing to 5/5 with cues and Divergent responsive pt naming 5 animals in a provided 1 minute time frame given moderate cues  Conversational speech WFL      Motor Speech:  WFL    Voice:   WFL    Visual-Spatial:  Pt with questionable L neglect possibly. Should further assess.     Reading:   tba      Written Expression:   tba    Oral Musculature Evaluation  Oral Musculature: left weakness  Dentition: present and adequate  Secretion Management: adequate  Mucosal Quality: good  Oral Labial Strength and Mobility: WFL  Lingual Strength and Mobility: WFL  Voice Prior to PO Intake: clear    Bedside Swallow Eval:   Consistencies Assessed:  Thin liquids via straw  Solids from regular breakfast tray      Oral Phase:   WFL    Pharyngeal Phase:   no overt clinical signs/symptoms of aspiration  no overt clinical signs/symptoms of pharyngeal dysphagia    Compensatory Strategies  None    Treatment: Pt's daughter present at bedside administering pt's regular breakfast tray. Pt tolerating without over signs of aspiration. Recommend continuing regular diet with thin liquids. SLP provided education regarding role of SLP, aspiration precautions, speech/language deficits, recommendations,and SLP POC. Daughter expressed understanding. Pt would benefit from reinforcement. SLP will continue to follow.     Goals:   Multidisciplinary Problems       SLP Goals          Problem: SLP    Goal Priority Disciplines Outcome   SLP Goal     SLP Progressing   Description: Speech Language Pathology Goals  Goals expected to be met by  1/3    1. Pt will tolerate least restrictive and safest diet to maintain hydration and nutritional needs without signs of airway compromise.  2. Pt will orient x4  3. Pt will answer complex y/n questions with 80% accuracy  4. Pt will complete functional naming tasks with 80% accuracy  5. Pt will participate in ongoing assessment of reading, writing, and visual-spatial skills.                               Plan:     Patient to be seen:  4 x/week   Plan of Care expires:  01/17/25  Plan of Care reviewed with:  patient, daughter   SLP Follow-Up:  Yes       Discharge recommendations:  Therapy Intensity Recommendations at Discharge:  (tbd)     Time Tracking:     SLP Treatment Date:   12/20/24  Speech Start Time:  0840  Speech Stop Time:  0911     Speech Total Time (min):  31 min    Billable Minutes: Eval 13 , Eval Swallow and Oral Function 10, and Self Care/Home Management Training 8    12/20/2024

## 2024-12-20 NOTE — PLAN OF CARE
Problem: SLP  Goal: SLP Goal  Description: Speech Language Pathology Goals  Goals expected to be met by 1/3    1. Pt will tolerate least restrictive and safest diet to maintain hydration and nutritional needs without signs of airway compromise.  2. Pt will orient x4  3. Pt will answer complex y/n questions with 80% accuracy  4. Pt will complete functional naming tasks with 80% accuracy  5. Pt will participate in ongoing assessment of reading, writing, and visual-spatial skills.   Outcome: Progressing     Recommend continuing a regular diet with thin liquids with strict aspiration precautions. SLP will continue to follow.

## 2024-12-20 NOTE — PLAN OF CARE
Problem: Occupational Therapy  Goal: Occupational Therapy Goal  Description: Goals to be met by: 1/20/24     Patient will increase functional independence with ADLs by performing:    UE Dressing with Moderate Assistance.  LE Dressing with Moderate Assistance.  Grooming while EOB with Minimal Assistance.  Toileting from bedside commode with Maximum Assistance for hygiene and clothing management.   Sitting at edge of bed x10 minutes with Supervision.  Supine to sit with Supervision.  Stand pivot transfers with Maximum Assistance.  Step transfer with Maximum Assistance  Toilet transfer to bedside commode with Maximum Assistance.    Outcome: Progressing

## 2024-12-20 NOTE — PLAN OF CARE
PT evaluation complete and appropriate goals established.    Problem: Physical Therapy  Goal: Physical Therapy Goal  Description: Goals to be met by: 2025     Patient will increase functional independence with mobility by performin. Supine to sit with Moderate Assistance  2. Sit to supine with Moderate Assistance  3. Sit to stand transfer with Maximum Assistance  4. Bed to chair transfer with Maximum Assistance using LRAD  5. Gait  x 5 feet with Maximum Assistance using LRAD.   6. Sitting at edge of bed x5 minutes with Minimal Assistance  7. Lower extremity exercise program x15 reps per handout, with assistance as needed    Outcome: Progressing     2024

## 2024-12-20 NOTE — H&P
Ramin Doyle - Neurosurgery ([facility])  Vascular Neurology  [facility]  History & Physical    Consults  Assessment/Plan:     Patient is a 89 y.o. year old female with:    * Stroke due to occlusion of right posterior cerebral artery  Ms. Willa Kim is a 89 y.o. patient transferred from [facility] for right PCA (P1) occlusion monitoring. History of afib on Xarelto, CHF (EF 60%), HTN, DM. Presented with LSW. No baseline NIHSS documented from telestroke. CTH with subacute right MCA infarct; no follow up CTA done. MRI brain with right medial temporo-occipital territory infarct (lateral thalamus) with proximal P1 segment PCA occlusion. OOW for TNK, not IR candidate. NIHSS 9 with LSW and not fully crossing midline. Daughter noticed some dysarthria this AM. Suspected embolic/cardio-embolic etiology. Given some evidence of cerebral atrophy, risk of malignant cerebral edema is low but patient should be closely monitored.    Antithrombotics for secondary stroke prevention:  AC timing to be determined on 12/20/24     Statins for secondary stroke prevention and hyperlipidemia, if present:   Statins: Atorvastatin- 40 mg daily    Aggressive risk factor modification: HTN, A-Fib     Rehab efforts: The patient has been evaluated by a stroke team provider and the therapy needs have been fully considered based off the presenting complaints and exam findings. The following therapy evaluations are needed: PT evaluate and treat, OT evaluate and treat, SLP evaluate and treat, PM&R evaluate for appropriate placement, when able     Diagnostics ordered/pending: HgbA1C to assess blood glucose levels, Lipid Profile to assess cholesterol levels, TTE to assess cardiac function/status , TSH to assess thyroid function    VTE prophylaxis: Mechanical prophylaxis: Place SCDs; pending timing to start AC     BP parameters: Infarct: No intervention, SBP <220    Other goals: maintain eunatremia and normothermia     CHF (congestive  heart failure)  Resumed home Lasix 20    A-fib  Holding home Xarelto. Will consider AC timing to resume while in the hospital; previously on amiodarone and diltiazem, which the patient is not taking anymore    GERD (gastroesophageal reflux disease)  Resumed home protonix     Essential hypertension  Stroke risk factor   SBP < 220  Holding home losartan for permissive HTN     Urge incontinence  Resumed home oxybutinin   On purewick     Diabetes mellitus, type II  Diabetic diet; Passed Udemy   POCG         STROKE DOCUMENTATION          NIH Scale:  1a. Level of Consciousness: 0-->Alert, keenly responsive  1b. LOC Questions: 0-->Answers both questions correctly  1c. LOC Commands: 0-->Performs both tasks correctly  2. Best Gaze: 1-->Partial gaze palsy, gaze is abnormal in one or both eyes, but forced deviation or total gaze paresis is not present  3. Visual: 0-->No visual loss  4. Facial Palsy: 2-->Partial paralysis (total or near-total paralysis of lower face)  5a. Motor Arm, Left: 3-->No effort against gravity, limb falls  5b. Motor Arm, Right: 0-->No drift, limb holds 90 (or 45) degrees for full 10 secs  6a. Motor Leg, Left: 2-->Some effort against gravity, leg falls to bed by 5 secs, but has some effort against gravity  6b. Motor Leg, Right: 0-->No drift, leg holds 30 degree position for full 5 secs  7. Limb Ataxia: 0-->Absent  8. Sensory: 0-->Normal, no sensory loss  9. Best Language: 0-->No aphasia, normal  10. Dysarthria: 1-->Mild-to-moderate dysarthria, patient slurs at least some words and, at worst, can be understood with some difficulty  11. Extinction and Inattention (formerly Neglect): 0-->No abnormality  Total (NIH Stroke Scale): 9     Modified Perlita    Asya Coma Scale:    ABCD2 Score:    [id number] Score:   HAS -BLED Score:   ICH Score:   Hunt & Wilson Classification:      Thrombolysis Candidate?  Not Applicable     Delays to Thrombolysis?  Not Applicable    Interventional Revascularization  Candidate?   Is the patient eligible for mechanical endovascular reperfusion ([place])?   Not Applicable     Delays to Thrombectomy? Not Applicable    Hemorrhagic change of an Ischemic Stroke: Does this patient have an ischemic stroke with hemorrhagic changes? No     Subjective:     History of Present Illness:  Ms. Willa Kim is a 89 y.o. patient transferred from [facility] for close monitoring of right PCA (P1) occlusion. History of afib on Xarelto, CHF (EF 60%), HTN, DM. Presented to OSH with LSW, LFD. Telestroke consult performed, no baseline NIHSS documented. CTH with subacute right MCA infarct; no follow up CTA done. MRI brain with right medial temporo-occipital territory infarct (lateral thalamus) with proximal P1 segment PCA occlusion. OOW for TNK, not IR candidate. Alert, following commands. Initial NIHSS 9 upon arrival to [facility] with LSW, dysarthria since this morning and some difficulty crossing midline.           Past Medical History:   Diagnosis Date    A-fib     Anticoagulant long-term use     Arthritis     ASCVD (arteriosclerotic cardiovascular disease)     Breast cancer @ 35y/o    Left breast    Bronchitis     CKD (chronic kidney disease)     Coronary artery disease     Diabetes mellitus     -150    Diverticulosis     Elevated cholesterol     Encounter for blood transfusion     GERD (gastroesophageal reflux disease)     High cholesterol     History of mammogram 10/2/12    Normal    History of small bowel obstruction     Hypertension     Internal hemorrhoids     Mitral valve disorder     YANIQUE (obstructive sleep apnea)     NO C PAP USE    Osteoarthritis     Osteoporosis, unspecified     PSVT (paroxysmal supraventricular tachycardia)     SBO (small bowel obstruction)     UTI (urinary tract infection)     Wears dentures      Past Surgical History:   Procedure Laterality Date    APPENDECTOMY      BACK SURGERY      CATARACT EXTRACTION W/ INTRAOCULAR LENS  IMPLANT, BILATERAL      CERVICAL  FUSION      CHOLECYSTECTOMY      COLON SURGERY      COLON RESECT    COLONOSCOPY N/A 6/5/2019    Procedure: SCREENING COLONOSCOPY;  Surgeon: Matt Camacho MD;  Location: [facility];  Service: Endoscopy;  Laterality: N/A;    CYSTOSCOPY      history breast cancer      HYSTERECTOMY      DILLON --bleeding/fibroids    JOINT REPLACEMENT Right     knee    KNEE ARTHROSCOPY Right     MASTECTOMY  @ 37y/o    Left Breast    OOPHORECTOMY      BSO    OOPHORECTOMY       Social History     Tobacco Use    Smoking status: Never    Smokeless tobacco: Never   Substance Use Topics    Alcohol use: No    Drug use: No     Review of patient's allergies indicates:   Allergen Reactions    Clindamycin      Other reaction(s): Abdominal Pain    Adhesive     Celestone [betamethasone sodium phosphate]      High blood pressure.    Diclofenac sodium     Keflex [cephalexin] Other (See Comments)     blisters    Meloxicam     Pcn [penicillins] Swelling    Percodan [oxycodone hcl-oxycodone-asa]      Itching    Sulfa (sulfonamide antibiotics) Nausea And Vomiting    Trimethoprim Itching    Vibramycin [doxycycline calcium]      Heart race       Medications: I have reviewed the current medication administration record.    Medications Prior to Admission   Medication Sig Dispense Refill Last Dose/Taking    ACCU-CHEK GUIDE TEST STRIPS Strp USE 1 STRIP TWICE DAILY       ACCU-CHEK SOFT DEV LANCETS Kit USE AS DIRECTED TO CHECK GLUCOSE       ACCU-CHEK SOFTCLIX LANCETS MISC by Misc.(Non-Drug; Combo Route) route.       ACCU-CHEK SOFTCLIX LANCETS Misc Apply topically 2 (two) times daily.       acetaminophen (TYLENOL) 325 MG tablet Take 2 tablets (650 mg total) by mouth every 6 (six) hours as needed for Pain.  0     amiodarone (PACERONE) 200 MG Tab Take 100 mg by mouth every morning.  (Patient not taking: Reported on 11/5/2024)       ascorbic acid, vitamin C, (VITAMIN C) 250 mg Chew Take by mouth.       atorvastatin (LIPITOR) 10 MG tablet Take 10 mg by mouth every  evening.        blood sugar diagnostic (ACCU-CHEK GUIDE TEST STRIPS MISC) by Misc.(Non-Drug; Combo Route) route.       calcium carbonate (OS-PAU) 600 mg calcium (1,500 mg) Tab Take 1,200 mg by mouth 2 (two) times daily with meals.       calcium-vitamin D3 (OS- + D3) 500 mg-5 mcg (200 unit) per tablet Take 1 tablet by mouth 2 (two) times daily with meals.       diltiaZEM (CARDIZEM CD) 120 MG Cp24 diltiazem ER (XR/XT) 120 mg capsule,extended release 24 hr, controlled (Patient not taking: Reported on 11/5/2024)       FARXIGA 5 mg Tab tablet Take 5 mg by mouth.       furosemide (LASIX) 20 MG tablet Take 20 mg by mouth every other day.        gabapentin (NEURONTIN) 300 MG capsule Take 300 mg by mouth 3 (three) times daily.       glipiZIDE (GLUCOTROL) 10 MG tablet Take 20 mg by mouth 2 (two) times daily.       glipiZIDE (GLUCOTROL) 5 MG tablet Take 5 mg by mouth 2 (two) times daily. (Patient not taking: Reported on 11/5/2024)       latanoprost 0.005 % ophthalmic solution latanoprost 0.005 % eye drops (Patient not taking: Reported on 11/5/2024)       loratadine (CLARITIN) 10 mg tablet Take 10 mg by mouth once daily.       losartan (COZAAR) 50 MG tablet Take 50 mg by mouth every morning.  (Patient not taking: Reported on 11/5/2024)       metoprolol tartrate (LOPRESSOR) 50 MG tablet Take 50 mg by mouth 2 (two) times daily.        multivitamin (THERAGRAN) per tablet Take 1 tablet by mouth once daily.       mv-min-FA-D3-om3-dha-epa-fish 200 mcg-500 unit-200 mg Cap Take 1 capsule by mouth 2 (two) times daily.        olmesartan (BENICAR) 5 MG Tab Take by mouth once daily. 2.5 mg daily       oxybutynin (DITROPAN-XL) 10 MG 24 hr tablet oxybutynin chloride ER 10 mg tablet,extended release 24 hr   TAKE 1 TABLET BY MOUTH  DAILY AS DIRECTED       pantoprazole (PROTONIX) 40 MG tablet Take 40 mg by mouth every morning.        rivaroxaban (XARELTO) 15 mg Tab Take 15 mg by mouth every evening.        triamcinolone acetonide 0.1%  (KENALOG) 0.1 % cream Apply topically 2 (two) times daily.       UNABLE TO FIND medication name: Clindamycin/ Mupirocin/ Itraconazole 150/20/50mg Topical Capsule (Patient not taking: Reported on 11/5/2024)       UNABLE TO FIND medication name: gentamicin 60 mg Xylitol (Patient not taking: Reported on 11/5/2024)          Review of Systems   Neurological:  Positive for facial asymmetry and weakness. Negative for seizures and headaches.     Objective:     Vital Signs (Most Recent):  Temp: 98.5 °F (36.9 °C) (12/19/24 2357)  Pulse: 87 (12/20/24 0021)  Resp: 16 (12/19/24 2357)  BP: (!) 145/66 (12/19/24 2357)  SpO2: 98 % (12/19/24 2357)    Vital Signs Range (Last 24H):  Temp:  [97.4 °F (36.3 °C)-98.7 °F (37.1 °C)]   Pulse:  [65-92]   Resp:  [16-18]   BP: (101-156)/(58-66)   SpO2:  [91 %-98 %]        Physical Exam  Neurological:      Comments: See Neurological Exam               Neurological Exam:   LOC: alert  Attention Span: Good   Language: No aphasia  Articulation: Dysarthria  Orientation: Person, Place, Time   Visual Fields: Full  Facial Movement (CN VII): Lower facial weakness on the Left  Motor: Arm left  Plegia 0/5  Leg left  Paresis: 1/5  Arm right  Normal 5/5  Leg right Normal 5/5  Sensation: Intact to light touch, temperature and vibration  Tone: Normal tone throughout      Diagnostic Results:    Brain imaging:  CTH with subacute right MCA infarct     MRI brain with right medial temporo-occipital territory infarct (lateral thalamus) with proximal P1 segment PCA occlusion      Jerry Young MD  Com[facility] St[facility]  Department of Vascular Neurology   [facility] Neurosurgery [facility])

## 2024-12-20 NOTE — SUBJECTIVE & OBJECTIVE
Past Medical History:   Diagnosis Date    A-fib     Anticoagulant long-term use     Arthritis     ASCVD (arteriosclerotic cardiovascular disease)     Breast cancer @ 35y/o    Left breast    Bronchitis     CKD (chronic kidney disease)     Coronary artery disease     Diabetes mellitus     -150    Diverticulosis     Elevated cholesterol     Encounter for blood transfusion     GERD (gastroesophageal reflux disease)     High cholesterol     History of mammogram 10/2/12    Normal    History of small bowel obstruction     Hypertension     Internal hemorrhoids     Mitral valve disorder     YANIQUE (obstructive sleep apnea)     NO C PAP USE    Osteoarthritis     Osteoporosis, unspecified     PSVT (paroxysmal supraventricular tachycardia)     SBO (small bowel obstruction)     UTI (urinary tract infection)     Wears dentures      Past Surgical History:   Procedure Laterality Date    APPENDECTOMY      BACK SURGERY      CATARACT EXTRACTION W/ INTRAOCULAR LENS  IMPLANT, BILATERAL      CERVICAL FUSION      CHOLECYSTECTOMY      COLON SURGERY      COLON RESECT    COLONOSCOPY N/A 6/5/2019    Procedure: SCREENING COLONOSCOPY;  Surgeon: Matt Camacho MD;  Location: [facility];  Service: Endoscopy;  Laterality: N/A;    CYSTOSCOPY      history breast cancer      HYSTERECTOMY      DILLON --bleeding/fibroids    JOINT REPLACEMENT Right     knee    KNEE ARTHROSCOPY Right     MASTECTOMY  @ 35y/o    Left Breast    OOPHORECTOMY      BSO    OOPHORECTOMY       Social History     Tobacco Use    Smoking status: Never    Smokeless tobacco: Never   Substance Use Topics    Alcohol use: No    Drug use: No     Review of patient's allergies indicates:   Allergen Reactions    Clindamycin      Other reaction(s): Abdominal Pain    Adhesive     Celestone [betamethasone sodium phosphate]      High blood pressure.    Diclofenac sodium     Keflex [cephalexin] Other (See Comments)     blisters    Meloxicam     Pcn [penicillins] Swelling    Percodan  [oxycodone hcl-oxycodone-asa]      Itching    Sulfa (sulfonamide antibiotics) Nausea And Vomiting    Trimethoprim Itching    Vibramycin [doxycycline calcium]      Heart race       Medications: I have reviewed the current medication administration record.    Medications Prior to Admission   Medication Sig Dispense Refill Last Dose/Taking    ACCU-CHEK GUIDE TEST STRIPS Strp USE 1 STRIP TWICE DAILY       ACCU-CHEK SOFT DEV LANCETS Kit USE AS DIRECTED TO CHECK GLUCOSE       ACCU-CHEK SOFTCLIX LANCETS MISC by Misc.(Non-Drug; Combo Route) route.       ACCU-CHEK SOFTCLIX LANCETS Misc Apply topically 2 (two) times daily.       acetaminophen (TYLENOL) 325 MG tablet Take 2 tablets (650 mg total) by mouth every 6 (six) hours as needed for Pain.  0     amiodarone (PACERONE) 200 MG Tab Take 100 mg by mouth every morning.  (Patient not taking: Reported on 11/5/2024)       ascorbic acid, vitamin C, (VITAMIN C) 250 mg Chew Take by mouth.       atorvastatin (LIPITOR) 10 MG tablet Take 10 mg by mouth every evening.        blood sugar diagnostic (ACCU-CHEK GUIDE TEST STRIPS MISC) by Misc.(Non-Drug; Combo Route) route.       calcium carbonate (OS-PAU) 600 mg calcium (1,500 mg) Tab Take 1,200 mg by mouth 2 (two) times daily with meals.       calcium-vitamin D3 (OS- + D3) 500 mg-5 mcg (200 unit) per tablet Take 1 tablet by mouth 2 (two) times daily with meals.       diltiaZEM (CARDIZEM CD) 120 MG Cp24 diltiazem ER (XR/XT) 120 mg capsule,extended release 24 hr, controlled (Patient not taking: Reported on 11/5/2024)       FARXIGA 5 mg Tab tablet Take 5 mg by mouth.       furosemide (LASIX) 20 MG tablet Take 20 mg by mouth every other day.        gabapentin (NEURONTIN) 300 MG capsule Take 300 mg by mouth 3 (three) times daily.       glipiZIDE (GLUCOTROL) 10 MG tablet Take 20 mg by mouth 2 (two) times daily.       glipiZIDE (GLUCOTROL) 5 MG tablet Take 5 mg by mouth 2 (two) times daily. (Patient not taking: Reported on 11/5/2024)        latanoprost 0.005 % ophthalmic solution latanoprost 0.005 % eye drops (Patient not taking: Reported on 11/5/2024)       loratadine (CLARITIN) 10 mg tablet Take 10 mg by mouth once daily.       losartan (COZAAR) 50 MG tablet Take 50 mg by mouth every morning.  (Patient not taking: Reported on 11/5/2024)       metoprolol tartrate (LOPRESSOR) 50 MG tablet Take 50 mg by mouth 2 (two) times daily.        multivitamin (THERAGRAN) per tablet Take 1 tablet by mouth once daily.       mv-min-FA-D3-om3-dha-epa-fish 200 mcg-500 unit-200 mg Cap Take 1 capsule by mouth 2 (two) times daily.        olmesartan (BENICAR) 5 MG Tab Take by mouth once daily. 2.5 mg daily       oxybutynin (DITROPAN-XL) 10 MG 24 hr tablet oxybutynin chloride ER 10 mg tablet,extended release 24 hr   TAKE 1 TABLET BY MOUTH  DAILY AS DIRECTED       pantoprazole (PROTONIX) 40 MG tablet Take 40 mg by mouth every morning.        rivaroxaban (XARELTO) 15 mg Tab Take 15 mg by mouth every evening.        triamcinolone acetonide 0.1% (KENALOG) 0.1 % cream Apply topically 2 (two) times daily.       UNABLE TO FIND medication name: Clindamycin/ Mupirocin/ Itraconazole 150/20/50mg Topical Capsule (Patient not taking: Reported on 11/5/2024)       UNABLE TO FIND medication name: gentamicin 60 mg Xylitol (Patient not taking: Reported on 11/5/2024)          Review of Systems   Neurological:  Positive for facial asymmetry and weakness. Negative for seizures and headaches.     Objective:     Vital Signs (Most Recent):  Temp: 98.5 °F (36.9 °C) (12/19/24 2357)  Pulse: 87 (12/20/24 0021)  Resp: 16 (12/19/24 2357)  BP: (!) 145/66 (12/19/24 2357)  SpO2: 98 % (12/19/24 2357)    Vital Signs Range (Last 24H):  Temp:  [97.4 °F (36.3 °C)-98.7 °F (37.1 °C)]   Pulse:  [65-92]   Resp:  [16-18]   BP: (101-156)/(58-66)   SpO2:  [91 %-98 %]        Physical Exam  Neurological:      Comments: See Neurological Exam               Neurological Exam:   LOC: alert  Attention Span: Good    Language: No aphasia  Articulation: Dysarthria  Orientation: Person, Place, Time   Visual Fields: Full  Facial Movement (CN VII): Lower facial weakness on the Left  Motor: Arm left  Plegia 0/5  Leg left  Paresis: 1/5  Arm right  Normal 5/5  Leg right Normal 5/5  Sensation: Intact to light touch, temperature and vibration  Tone: Normal tone throughout      Diagnostic Results:    Brain imaging:  CTH with subacute right MCA infarct     MRI brain with right medial temporo-occipital territory infarct (lateral thalamus) with proximal P1 segment PCA occlusion

## 2024-12-21 LAB
ALBUMIN SERPL BCP-MCNC: 2.6 G/DL (ref 3.5–5.2)
ALP SERPL-CCNC: 199 U/L (ref 40–150)
ALT SERPL W/O P-5'-P-CCNC: 115 U/L (ref 10–44)
ANION GAP SERPL CALC-SCNC: 10 MMOL/L (ref 8–16)
AST SERPL-CCNC: 66 U/L (ref 10–40)
BACTERIA UR CULT: ABNORMAL
BASOPHILS # BLD AUTO: 0.06 K/UL (ref 0–0.2)
BASOPHILS NFR BLD: 0.8 % (ref 0–1.9)
BILIRUB SERPL-MCNC: 0.4 MG/DL (ref 0.1–1)
BUN SERPL-MCNC: 26 MG/DL (ref 8–23)
CALCIUM SERPL-MCNC: 9 MG/DL (ref 8.7–10.5)
CHLORIDE SERPL-SCNC: 104 MMOL/L (ref 95–110)
CO2 SERPL-SCNC: 21 MMOL/L (ref 23–29)
CREAT SERPL-MCNC: 1.2 MG/DL (ref 0.5–1.4)
DIFFERENTIAL METHOD BLD: ABNORMAL
EOSINOPHIL # BLD AUTO: 0.3 K/UL (ref 0–0.5)
EOSINOPHIL NFR BLD: 4 % (ref 0–8)
ERYTHROCYTE [DISTWIDTH] IN BLOOD BY AUTOMATED COUNT: 12.2 % (ref 11.5–14.5)
EST. GFR  (NO RACE VARIABLE): 43.8 ML/MIN/1.73 M^2
GLUCOSE SERPL-MCNC: 196 MG/DL (ref 70–110)
HCT VFR BLD AUTO: 38.4 % (ref 37–48.5)
HGB BLD-MCNC: 12.3 G/DL (ref 12–16)
IMM GRANULOCYTES # BLD AUTO: 0.02 K/UL (ref 0–0.04)
IMM GRANULOCYTES NFR BLD AUTO: 0.3 % (ref 0–0.5)
LYMPHOCYTES # BLD AUTO: 2.2 K/UL (ref 1–4.8)
LYMPHOCYTES NFR BLD: 28.6 % (ref 18–48)
MCH RBC QN AUTO: 31.5 PG (ref 27–31)
MCHC RBC AUTO-ENTMCNC: 32 G/DL (ref 32–36)
MCV RBC AUTO: 99 FL (ref 82–98)
MONOCYTES # BLD AUTO: 0.6 K/UL (ref 0.3–1)
MONOCYTES NFR BLD: 8.1 % (ref 4–15)
NEUTROPHILS # BLD AUTO: 4.4 K/UL (ref 1.8–7.7)
NEUTROPHILS NFR BLD: 58.2 % (ref 38–73)
NRBC BLD-RTO: 0 /100 WBC
PLATELET # BLD AUTO: 210 K/UL (ref 150–450)
PMV BLD AUTO: 11.1 FL (ref 9.2–12.9)
POCT GLUCOSE: 188 MG/DL (ref 70–110)
POCT GLUCOSE: 222 MG/DL (ref 70–110)
POCT GLUCOSE: 232 MG/DL (ref 70–110)
POCT GLUCOSE: 268 MG/DL (ref 70–110)
POTASSIUM SERPL-SCNC: 4.3 MMOL/L (ref 3.5–5.1)
PROT SERPL-MCNC: 5.9 G/DL (ref 6–8.4)
RBC # BLD AUTO: 3.9 M/UL (ref 4–5.4)
SODIUM SERPL-SCNC: 135 MMOL/L (ref 136–145)
TSH SERPL DL<=0.005 MIU/L-ACNC: 0.44 UIU/ML (ref 0.4–4)
WBC # BLD AUTO: 7.53 K/UL (ref 3.9–12.7)

## 2024-12-21 PROCEDURE — 84443 ASSAY THYROID STIM HORMONE: CPT | Performed by: STUDENT IN AN ORGANIZED HEALTH CARE EDUCATION/TRAINING PROGRAM

## 2024-12-21 PROCEDURE — 36415 COLL VENOUS BLD VENIPUNCTURE: CPT

## 2024-12-21 PROCEDURE — 85025 COMPLETE CBC W/AUTO DIFF WBC: CPT

## 2024-12-21 PROCEDURE — 87086 URINE CULTURE/COLONY COUNT: CPT

## 2024-12-21 PROCEDURE — 11000001 HC ACUTE MED/SURG PRIVATE ROOM

## 2024-12-21 PROCEDURE — 87088 URINE BACTERIA CULTURE: CPT

## 2024-12-21 PROCEDURE — 25000003 PHARM REV CODE 250

## 2024-12-21 PROCEDURE — 99233 SBSQ HOSP IP/OBS HIGH 50: CPT | Mod: ,,, | Performed by: STUDENT IN AN ORGANIZED HEALTH CARE EDUCATION/TRAINING PROGRAM

## 2024-12-21 PROCEDURE — 25000003 PHARM REV CODE 250: Performed by: STUDENT IN AN ORGANIZED HEALTH CARE EDUCATION/TRAINING PROGRAM

## 2024-12-21 PROCEDURE — 80053 COMPREHEN METABOLIC PANEL: CPT

## 2024-12-21 PROCEDURE — 63600175 PHARM REV CODE 636 W HCPCS

## 2024-12-21 RX ORDER — INSULIN ASPART 100 [IU]/ML
5 INJECTION, SOLUTION INTRAVENOUS; SUBCUTANEOUS
Status: DISCONTINUED | OUTPATIENT
Start: 2024-12-21 | End: 2024-12-21

## 2024-12-21 RX ORDER — INSULIN GLARGINE 100 [IU]/ML
9 INJECTION, SOLUTION SUBCUTANEOUS DAILY
Status: DISCONTINUED | OUTPATIENT
Start: 2024-12-21 | End: 2024-12-22

## 2024-12-21 RX ADMIN — POLYETHYLENE GLYCOL 3350 17 G: 17 POWDER, FOR SOLUTION ORAL at 09:12

## 2024-12-21 RX ADMIN — GABAPENTIN 300 MG: 300 CAPSULE ORAL at 02:12

## 2024-12-21 RX ADMIN — ATORVASTATIN CALCIUM 40 MG: 40 TABLET, FILM COATED ORAL at 09:12

## 2024-12-21 RX ADMIN — CIPROFLOXACIN HYDROCHLORIDE 250 MG: 250 TABLET, FILM COATED ORAL at 09:12

## 2024-12-21 RX ADMIN — MUPIROCIN: 20 OINTMENT TOPICAL at 09:12

## 2024-12-21 RX ADMIN — ACETAMINOPHEN 650 MG: 325 TABLET ORAL at 07:12

## 2024-12-21 RX ADMIN — PANTOPRAZOLE SODIUM 40 MG: 20 TABLET, DELAYED RELEASE ORAL at 06:12

## 2024-12-21 RX ADMIN — METOPROLOL TARTRATE 50 MG: 50 TABLET, FILM COATED ORAL at 09:12

## 2024-12-21 RX ADMIN — INSULIN ASPART 3 UNITS: 100 INJECTION, SOLUTION INTRAVENOUS; SUBCUTANEOUS at 12:12

## 2024-12-21 RX ADMIN — ACETAMINOPHEN 650 MG: 325 TABLET ORAL at 01:12

## 2024-12-21 RX ADMIN — GABAPENTIN 300 MG: 300 CAPSULE ORAL at 08:12

## 2024-12-21 RX ADMIN — INSULIN GLARGINE 9 UNITS: 100 INJECTION, SOLUTION SUBCUTANEOUS at 10:12

## 2024-12-21 RX ADMIN — METOPROLOL TARTRATE 50 MG: 50 TABLET, FILM COATED ORAL at 08:12

## 2024-12-21 RX ADMIN — GABAPENTIN 300 MG: 300 CAPSULE ORAL at 09:12

## 2024-12-21 RX ADMIN — OXYBUTYNIN CHLORIDE 10 MG: 10 TABLET, EXTENDED RELEASE ORAL at 09:12

## 2024-12-21 RX ADMIN — CIPROFLOXACIN HYDROCHLORIDE 250 MG: 250 TABLET, FILM COATED ORAL at 08:12

## 2024-12-21 NOTE — PROGRESS NOTES
Ramin Doyle - Neurosurgery ([facility])  Vascular Neurology  Comprehensive Stroke Center  Progress Note    Assessment/Plan:     * Stroke due to occlusion of right posterior cerebral artery  Ms. Willa Kim is a 89 y.o. patient transferred from OSH for right PCA (P1) occlusion monitoring. History of afib on Xarelto, CHF (EF 60%), HTN, DM. Presented with LSW. No baseline NIHSS documented from telestroke. CTH with subacute right MCA infarct; no follow up CTA done. MRI brain with right medial temporo-occipital territory infarct (lateral thalamus) with proximal P1 segment PCA occlusion. OOW for TNK, not IR candidate. NIHSS 9 with LSW and not fully crossing midline. Daughter noticed some dysarthria this AM. Suspected embolic/cardio-embolic etiology. Given some evidence of cerebral atrophy, risk of malignant cerebral edema is low but patient should be closely monitored.    Antithrombotics for secondary stroke prevention:  AC timing to be determined on 12/23/24     Statins for secondary stroke prevention and hyperlipidemia, if present:   Statins: Atorvastatin- 40 mg daily    Aggressive risk factor modification: HTN, A-Fib     Rehab efforts: The patient has been evaluated by a stroke team provider and the therapy needs have been fully considered based off the presenting complaints and exam findings. The following therapy evaluations are needed: PT evaluate and treat, OT evaluate and treat, SLP evaluate and treat, PM&R evaluate for appropriate placement, when able     Diagnostics ordered/pending: HgbA1C to assess blood glucose levels, Lipid Profile to assess cholesterol levels, TTE to assess cardiac function/status , TSH to assess thyroid function    VTE prophylaxis: Mechanical prophylaxis: Place SCDs; pending timing to start AC     BP parameters: Infarct: No intervention, SBP <220    Other goals: maintain eunatremia and normothermia     CHF (congestive heart failure)  Resumed home Lasix 20    A-fib  Holding home Xarelto.  Will consider AC timing to resume while in the hospital; previously on amiodarone and diltiazem, which the patient is not taking anymore    GERD (gastroesophageal reflux disease)  Resumed home protonix     Essential hypertension  Stroke risk factor   SBP < 220  Holding home losartan for permissive HTN     Urge incontinence  Resumed home oxybutinin   On purewick     Diabetes mellitus, type II  Diabetic diet; Passed miller   LDSSI   POCG  Started basal glargine 12/21/24.         12/21/2024 - SHALONDA CHINO. F/u CTH w/ stable, delineated recent infarct in R. PCA territory. Holding rivaroxaban for the time being. Transaminitis being worked up via liver U/S. Blood glucose above the 140-180 target; added basal glargine.        STROKE DOCUMENTATION        NIH Scale:      Current NIH Stroke Score Values      Flowsheet Row Most Recent Value   Interval shift assessment   1a. Level of Consciousness 0-->Alert, keenly responsive   1b. LOC Questions 0-->Answers both questions correctly   1c. LOC Commands 0-->Performs both tasks correctly   2. Best Gaze 1-->Partial gaze palsy, gaze is abnormal in one or both eyes, but forced deviation or total gaze paresis is not present   3. Visual 0-->No visual loss   4. Facial Palsy 2-->Partial paralysis (total or near-total paralysis of lower face)   5a. Motor Arm, Left 2-->Some effort against gravity, limb cannot get to or maintain (if cued) 90 (or 45) degrees, drifts down to bed, but has some effort against gravity   5b. Motor Arm, Right 0-->No drift, limb holds 90 (or 45) degrees for full 10 secs   6a. Motor Leg, Left 2-->Some effort against gravity, leg falls to bed by 5 secs, but has some effort against gravity   6b. Motor Leg, Right 0-->No drift, leg holds 30 degree position for full 5 secs   7. Limb Ataxia 0-->Absent   8. Sensory 0-->Normal, no sensory loss   9. Best Language 0-->No aphasia, normal   10. Dysarthria 1-->Mild-to-moderate dysarthria, patient slurs at least some words and, at  worst, can be understood with some difficulty   11. Extinction and Inattention (formerly Neglect) 0-->No abnormality   Total (NIH Stroke Scale) 8            Modified [place]    Asya Coma Scale:    ABCD2 Score:    [id number] Score:   HAS -BLED Score:   ICH Score:   Hunt & Wilson Classification:      Hemorrhagic change of an Ischemic Stroke: Does this patient have an ischemic stroke with hemorrhagic changes? No     Neurologic Chief Complaint: R. Lateral thalamus and with proximal P1 segment PCA occlusion     Subjective:     Interval History: Patient is seen for follow-up neurological assessment and treatment recommendations:     SHALONDA CHINO. F/u CTH w/ stable, delineated recent infarct in R. PCA territory. Holding rivaroxaban for the time being. Transaminitis being worked up via liver U/S. Blood glucose above the 140-180 target; added basal glargine.    HPI, Past Medical, Family, and Social History remains the same as documented in the initial encounter.     Review of Systems   Reason unable to perform ROS: See HPI.     Scheduled Meds:   atorvastatin  40 mg Oral Daily    ciprofloxacin HCl  250 mg Oral Q12H    furosemide  20 mg Oral Every other day    gabapentin  300 mg Oral TID    insulin glargine U-100  9 Units Subcutaneous Daily    metoprolol tartrate  50 mg Oral BID    mupirocin   Nasal BID    oxybutynin  10 mg Oral Daily    pantoprazole  40 mg Oral QAM     Continuous Infusions:  PRN Meds:  Current Facility-Administered Medications:     acetaminophen, 650 mg, Oral, Q6H PRN    bisacodyL, 10 mg, Rectal, Daily PRN    dextrose 50%, 12.5 g, Intravenous, PRN    dextrose 50%, 25 g, Intravenous, PRN    glucagon (human recombinant), 1 mg, Intramuscular, PRN    glucose, 16 g, Oral, PRN    glucose, 24 g, Oral, PRN    insulin aspart U-100, 0-5 Units, Subcutaneous, QID (AC + HS) PRN    labetalol, 10 mg, Intravenous, Q15 Min PRN    sodium chloride 0.9%, 10 mL, Intravenous, PRN    Objective:     Vital Signs (Most Recent):  Temp:  "98.4 °F (36.9 °C) (12/21/24 1140)  Pulse: 63 (12/21/24 1140)  Resp: 18 (12/21/24 1140)  BP: (!) 115/58 (12/21/24 1140)  SpO2: 95 % (12/21/24 1140)  BP Location: Left arm    Vital Signs Range (Last 24H):  Temp:  [97.7 °F (36.5 °C)-98.8 °F (37.1 °C)]   Pulse:  [62-73]   Resp:  [16-18]   BP: (115-140)/(58-63)   SpO2:  [93 %-96 %]   BP Location: Left arm       Physical Exam  Neurological:      Comments: See Neurological Exam             Neurological Exam:   LOC: alert  Attention Span: Good   Language: No aphasia  Articulation: Dysarthria  Orientation: Person, Place, Time   Visual Fields: Full and Visual neglect  EOM (CN III, IV, VI): Gaze preference  right  Motor: Arm left  Plegia 0/5  Leg left  Paresis: 1/5  Arm right  Normal 5/5  Leg right Normal 5/5  Sensation: Joel-anesthesia left    Laboratory:  CMP:   Recent Labs   Lab 12/21/24  0340   CALCIUM 9.0   ALBUMIN 2.6*   PROT 5.9*   *   K 4.3   CO2 21*      BUN 26*   CREATININE 1.2   ALKPHOS 199*   *   AST 66*   BILITOT 0.4     CBC:   Recent Labs   Lab 12/21/24  0340   WBC 7.53   RBC 3.90*   HGB 12.3   HCT 38.4      MCV 99*   MCH 31.5*   MCHC 32.0     Lipid Panel:   Recent Labs   Lab 12/20/24  0717   CHOL 106*   LDLCALC 56.2*   HDL 37*   TRIG 64     Coagulation: No results for input(s): "PT", "INR", "APTT" in the last 168 hours.  Hgb A1C:   Recent Labs   Lab 12/20/24  0717   HGBA1C 7.6*     TSH:   Recent Labs   Lab 12/21/24  0340   TSH 0.437       Diagnostic Results     Brain imaging:  CTH with subacute right MCA infarct      MRI brain with right medial temporo-occipital territory infarct (lateral thalamus) with proximal P1 segment PCA occlusion    Cardiac Imaging     Left Ventricle: The left ventricle is normal in size. Ventricular mass is normal. Normal wall thickness. There is concentric remodeling. Normal wall motion. There is normal systolic function with a visually estimated ejection fraction of 60 - 65%. There is indeterminate diastolic " function. Elevated left ventricular filling pressure.    Right Ventricle: Normal right ventricular cavity size. Wall thickness is normal. Systolic function is normal.    Left Atrium: Agitated saline study of the atrial septum is negative after vasalva maneuver, suggesting absence of intracardiac shunt at the atrial level.    Aortic Valve: There is mild annular calcification present.    Mitral Valve: There is moderate mitral annular calcification present.    Pulmonary Artery: Pulmonary artery pressure could not be estimated.    IVC/SVC: Normal venous pressure at 3 mmHg.        Ramesh Burogs MD  [facility]  Department of Vascular Neurology   [facility])

## 2024-12-21 NOTE — PLAN OF CARE
Problem: Adult Inpatient Plan of Care  Goal: Plan of Care Review  Outcome: Progressing  Goal: Patient-Specific Goal (Individualized)  Outcome: Progressing  Goal: Optimal Comfort and Wellbeing  Outcome: Progressing  Goal: Readiness for Transition of Care  Outcome: Progressing     Problem: Adult Inpatient Plan of Care  Goal: Plan of Care Review  Outcome: Progressing     Problem: Adult Inpatient Plan of Care  Goal: Readiness for Transition of Care  Outcome: Progressing     Problem: Wound  Goal: Optimal Coping  Outcome: Progressing  Goal: Optimal Functional Ability  Outcome: Progressing  Goal: Optimal Wound Healing  Outcome: Progressing     Problem: Wound  Goal: Optimal Coping  Outcome: Progressing     Problem: Wound  Goal: Optimal Functional Ability  Outcome: Progressing     Problem: Wound  Goal: Optimal Wound Healing  Outcome: Progressing       POC reviewed with the patient at the bedside. Verbalization of understanding voiced. Questions and concerns addressed. Precautions maintained. No events noted at present during this shift. Cardiac monitoring ongoing. Vital signs all shift. See flow sheet.  Upon exiting room bed low and locked with call light within reach and bed alarm activated. POC ongoing.

## 2024-12-21 NOTE — PROGRESS NOTES
Ramin Doyle - Neurosurgery ([facility])  Vascular Neurology  Comprehensive Stroke Center  Progress Note    Assessment/Plan:     * Stroke due to occlusion of right posterior cerebral artery  Ms. Willa Kim is a 89 y.o. patient transferred from OSH for right PCA (P1) occlusion monitoring. History of afib on Xarelto, CHF (EF 60%), HTN, DM. Presented with LSW. No baseline NIHSS documented from telestroke. CTH with subacute right MCA infarct; no follow up CTA done. MRI brain with right medial temporo-occipital territory infarct (lateral thalamus) with proximal P1 segment PCA occlusion. OOW for TNK, not IR candidate. NIHSS 9 with LSW and not fully crossing midline. Daughter noticed some dysarthria this AM. Suspected embolic/cardio-embolic etiology. Given some evidence of cerebral atrophy, risk of malignant cerebral edema is low but patient should be closely monitored.    Antithrombotics for secondary stroke prevention:  AC timing to be determined on 12/23/24     Statins for secondary stroke prevention and hyperlipidemia, if present:   Statins: Atorvastatin- 40 mg daily    Aggressive risk factor modification: HTN, A-Fib     Rehab efforts: The patient has been evaluated by a stroke team provider and the therapy needs have been fully considered based off the presenting complaints and exam findings. The following therapy evaluations are needed: PT evaluate and treat, OT evaluate and treat, SLP evaluate and treat, PM&R evaluate for appropriate placement, when able     Diagnostics ordered/pending: HgbA1C to assess blood glucose levels, Lipid Profile to assess cholesterol levels, TTE to assess cardiac function/status , TSH to assess thyroid function    VTE prophylaxis: Mechanical prophylaxis: Place SCDs; pending timing to start AC     BP parameters: Infarct: No intervention, SBP <220    Other goals: maintain eunatremia and normothermia     CHF (congestive heart failure)  Resumed home Lasix 20    A-fib  Holding home Xarelto.  Will consider AC timing to resume while in the hospital; previously on amiodarone and diltiazem, which the patient is not taking anymore    GERD (gastroesophageal reflux disease)  Resumed home protonix     Essential hypertension  Stroke risk factor   SBP < 220  Holding home losartan for permissive HTN     Urge incontinence  Resumed home oxybutinin   On purewick     Diabetes mellitus, type II  Diabetic diet; Passed miller   LDSSI   POCG  Started basal glargine 12/21/24.         No notes on file    STROKE DOCUMENTATION        NIH Scale:          Modified [place]    Asya Coma Scale:    ABCD2 Score:    [id number] Score:   HAS -BLED Score:   ICH Score:   Hunt & Wilson Classification:      Hemorrhagic change of an Ischemic Stroke: Does this patient have an ischemic stroke with hemorrhagic changes? No     No new subjective & objective note has been filed under this hospital service since the last note was generated.      Ramesh Burgos MD  Comprehensive Stroke Center  Department of Vascular Neurology   [facility] - Neurosurgery ([facility])

## 2024-12-21 NOTE — HOSPITAL COURSE
12/21/2024 - NAEO, VSS. F/u CTH w/ stable, delineated recent infarct in R. PCA territory. Holding rivaroxaban for the time being. Transaminitis being worked up via liver U/S. Blood glucose above the 140-180 target; added basal glargine.  12/22/2024 - NAEO, VSS. F/u CTH w/ stable, delineated recent infarct in R. PCA territory. Holding rivaroxaban for the time being. Transaminitis being worked up via liver U/S - s/f steatosis. Blood glucose trending higher, went up on basal by 2U.  12/23/2024 - NAEON. Working well with PT/OT (she stood up briefly!) Exam improved today. Resuming Xarelto tonight.   12/24/2024 - NAEON. Medically ready, pending SNF. Added bowel regimen.   12/25/2024 - NAEON. New ARNULFO, giving IVF.   12/26/2024 - NAEON. ARNULFO resolved with IVF. Pending SNF.  12/27/2024 - NAEON. Able to cross midline today. Pending SNF.   12/28/2024 - Medically ready pending SNF  12/29/2024 - Medically ready pending SNF. Resuming home Lasix 20 every other day now that ARNULFO is resolved.   12/30/2024 NAEON. Neuro exam stable. Medically ready for discharge, will discharge to SNF today.

## 2024-12-21 NOTE — ASSESSMENT & PLAN NOTE
Ms. Willa Kim is a 89 y.o. patient transferred from OSH for right PCA (P1) occlusion monitoring. History of afib on Xarelto, CHF (EF 60%), HTN, DM. Presented with LSW. No baseline NIHSS documented from telestroke. CTH with subacute right MCA infarct; no follow up CTA done. MRI brain with right medial temporo-occipital territory infarct (lateral thalamus) with proximal P1 segment PCA occlusion. OOW for TNK, not IR candidate. NIHSS 9 with LSW and not fully crossing midline. Daughter noticed some dysarthria this AM. Suspected embolic/cardio-embolic etiology. Given some evidence of cerebral atrophy, risk of malignant cerebral edema is low but patient should be closely monitored.    Antithrombotics for secondary stroke prevention:  AC timing to be determined on 12/23/24     Statins for secondary stroke prevention and hyperlipidemia, if present:   Statins: Atorvastatin- 40 mg daily    Aggressive risk factor modification: HTN, A-Fib     Rehab efforts: The patient has been evaluated by a stroke team provider and the therapy needs have been fully considered based off the presenting complaints and exam findings. The following therapy evaluations are needed: PT evaluate and treat, OT evaluate and treat, SLP evaluate and treat, PM&R evaluate for appropriate placement, when able     Diagnostics ordered/pending: HgbA1C to assess blood glucose levels, Lipid Profile to assess cholesterol levels, TTE to assess cardiac function/status , TSH to assess thyroid function    VTE prophylaxis: Mechanical prophylaxis: Place SCDs; pending timing to start AC     BP parameters: Infarct: No intervention, SBP <220    Other goals: maintain eunatremia and normothermia

## 2024-12-21 NOTE — SUBJECTIVE & OBJECTIVE
Neurologic Chief Complaint: R. Lateral thalamus and with proximal P1 segment PCA occlusion     Subjective:     Interval History: Patient is seen for follow-up neurological assessment and treatment recommendations:     SHALONDA CHINO. F/u CTH w/ stable, delineated recent infarct in R. PCA territory. Holding rivaroxaban for the time being. Transaminitis being worked up via liver U/S. Blood glucose above the 140-180 target; added basal glargine.    HPI, Past Medical, Family, and Social History remains the same as documented in the initial encounter.     Review of Systems   Reason unable to perform ROS: See HPI.     Scheduled Meds:   atorvastatin  40 mg Oral Daily    ciprofloxacin HCl  250 mg Oral Q12H    furosemide  20 mg Oral Every other day    gabapentin  300 mg Oral TID    insulin glargine U-100  9 Units Subcutaneous Daily    metoprolol tartrate  50 mg Oral BID    mupirocin   Nasal BID    oxybutynin  10 mg Oral Daily    pantoprazole  40 mg Oral QAM     Continuous Infusions:  PRN Meds:  Current Facility-Administered Medications:     acetaminophen, 650 mg, Oral, Q6H PRN    bisacodyL, 10 mg, Rectal, Daily PRN    dextrose 50%, 12.5 g, Intravenous, PRN    dextrose 50%, 25 g, Intravenous, PRN    glucagon (human recombinant), 1 mg, Intramuscular, PRN    glucose, 16 g, Oral, PRN    glucose, 24 g, Oral, PRN    insulin aspart U-100, 0-5 Units, Subcutaneous, QID (AC + HS) PRN    labetalol, 10 mg, Intravenous, Q15 Min PRN    sodium chloride 0.9%, 10 mL, Intravenous, PRN    Objective:     Vital Signs (Most Recent):  Temp: 98.4 °F (36.9 °C) (12/21/24 1140)  Pulse: 63 (12/21/24 1140)  Resp: 18 (12/21/24 1140)  BP: (!) 115/58 (12/21/24 1140)  SpO2: 95 % (12/21/24 1140)  BP Location: Left arm    Vital Signs Range (Last 24H):  Temp:  [97.7 °F (36.5 °C)-98.8 °F (37.1 °C)]   Pulse:  [62-73]   Resp:  [16-18]   BP: (115-140)/(58-63)   SpO2:  [93 %-96 %]   BP Location: Left arm       Physical Exam  Neurological:      Comments: See Neurological  "Exam             Neurological Exam:   LOC: alert  Attention Span: Good   Language: No aphasia  Articulation: Dysarthria  Orientation: Person, Place, Time   Visual Fields: Full and Visual neglect  EOM (CN III, IV, VI): Gaze preference  right  Motor: Arm left  Plegia 0/5  Leg left  Paresis: 1/5  Arm right  Normal 5/5  Leg right Normal 5/5  Sensation: Joel-anesthesia left    Laboratory:  CMP:   Recent Labs   Lab 12/21/24  0340   CALCIUM 9.0   ALBUMIN 2.6*   PROT 5.9*   *   K 4.3   CO2 21*      BUN 26*   CREATININE 1.2   ALKPHOS 199*   *   AST 66*   BILITOT 0.4     CBC:   Recent Labs   Lab 12/21/24  0340   WBC 7.53   RBC 3.90*   HGB 12.3   HCT 38.4      MCV 99*   MCH 31.5*   MCHC 32.0     Lipid Panel:   Recent Labs   Lab 12/20/24  0717   CHOL 106*   LDLCALC 56.2*   HDL 37*   TRIG 64     Coagulation: No results for input(s): "PT", "INR", "APTT" in the last 168 hours.  Hgb A1C:   Recent Labs   Lab 12/20/24  0717   HGBA1C 7.6*     TSH:   Recent Labs   Lab 12/21/24  0340   TSH 0.437       Diagnostic Results     Brain imaging:  CTH with subacute right MCA infarct      MRI brain with right medial temporo-occipital territory infarct (lateral thalamus) with proximal P1 segment PCA occlusion    Cardiac Imaging     Left Ventricle: The left ventricle is normal in size. Ventricular mass is normal. Normal wall thickness. There is concentric remodeling. Normal wall motion. There is normal systolic function with a visually estimated ejection fraction of 60 - 65%. There is indeterminate diastolic function. Elevated left ventricular filling pressure.    Right Ventricle: Normal right ventricular cavity size. Wall thickness is normal. Systolic function is normal.    Left Atrium: Agitated saline study of the atrial septum is negative after vasalva maneuver, suggesting absence of intracardiac shunt at the atrial level.    Aortic Valve: There is mild annular calcification present.    Mitral Valve: There is moderate " mitral annular calcification present.    Pulmonary Artery: Pulmonary artery pressure could not be estimated.    IVC/SVC: Normal venous pressure at 3 mmHg.

## 2024-12-21 NOTE — PLAN OF CARE
Ramin Doyle - Neurosurgery (Hospital)  Initial Discharge Assessment       Primary Care Provider: Rajiv Conte PA-C    Admission Diagnosis: Stroke [I63.9]    Admission Date: 12/19/2024  Expected Discharge Date:     Transition of Care Barriers: None    Payor: Friendly Score MGD [facility] / Plan: Friendly Score CHOICES / Product Type: Medicare Advantage /     Extended Emergency Contact Information  Primary Emergency Contact: Nuzhat Guzmán  Address: [address] [place] [address] [id number] [place] [address]  Home Phone: [phone]  Mobile Phone: [phone]  Relation: Daughter    Discharge Plan A: Home Health  Discharge Plan B: Home with family      [facility] - [place], [place] - [address]  [address] [facility]  [address]  [facility] [address]  Phone: [phone] Fax: [phone]    [address] [address] [facility] [address] [id number] [facility] [address]  [facility] [address]  Phone: [phone] Fax: [phone]      Initial Assessment (most recent)       Adult Discharge Assessment - 12/21/24 1111          Discharge Assessment    Assessment Type Discharge Planning Assessment     Confirmed/corrected address, phone number and insurance Yes     Confirmed Demographics Correct on Facesheet     Source of Information patient;family     Does patient/caregiver understand observation status Yes     Communicated BRAYDON with patient/caregiver Yes;Date not available/Unable to determine     Reason For Admission Stroke due to occlusion of right posterior cerebral artery     People in Home alone     Facility Arrived From: [facility]     Do you expect to return to your current living situation? Yes     Do you have help at home or someone to help you manage your care at home? No     Prior to hospitilization cognitive status: Alert/Oriented     Current cognitive status: Alert/Oriented     Walking or Climbing Stairs Difficulty yes     Walking or Climbing Stairs ambulation difficulty, requires  equipment     Dressing/Bathing Difficulty yes     Dressing/Bathing bathing difficulty, requires equipment     Equipment Currently Used at Home walker, rolling;wheelchair;bedside commode     Readmission within 30 days? No     Patient currently being followed by outpatient case management? No     Do you currently have service(s) that help you manage your care at home? No     Do you take prescription medications? Yes     Do you have prescription coverage? Yes     Coverage Payor: [employer] MGD [facility] [employer] CHOICES -     Do you have any problems affording any of your prescribed medications? No     Is the patient taking medications as prescribed? yes     How do you get to doctors appointments? family or friend will provide     Are you on dialysis? No     Do you take coumadin? No     Discharge Plan A Home Health     Discharge Plan B Home with family     DME Needed Upon Discharge  other (see comments)   TBD    Discharge Plan discussed with: Patient;Adult children     Transition of Care Barriers None        Physical Activity    On average, how many days per week do you engage in moderate to strenuous exercise (like a brisk walk)? 0 days     On average, how many minutes do you engage in exercise at this level? 0 min        Social Isolation    How often do you feel lonely or isolated from those around you?  Never        Alcohol Use    Q1: How often do you have a drink containing alcohol? Never     Q2: How many drinks containing alcohol do you have on a typical day when you are drinking? Patient does not drink     Q3: How often do you have six or more drinks on one occasion? Never                   Spoke to patient and her daughter at bed side. Pt lives at home alone. Post hospital  stay patients daughter will be pt support person and pt. has transportation at d/c with her family. There have been no hospitalizations within the last 30 days per pt. Verified pt PCP and preferred pharmacy. Pt stated not on  Coumadin and is not receiving dialysis. All questions answered regarding case management/ discharge planning , pt verbalized understanding. Discharge booklet with SW contact information given to pt.     Patient declines post acute placement. Patient wants to go home with home health. Probably through [facility].     Discharge Plan A and Plan B have been determined by review of patient's clinical status, future medical and therapeutic needs, and coverage/benefits for post-acute care in coordination with multidisciplinary team members.      SHIRA Almanza  [facility]   Ext: [phone]

## 2024-12-22 PROBLEM — D68.69 HYPERCOAGULABLE STATE DUE TO ATRIAL FIBRILLATION: Status: ACTIVE | Noted: 2024-12-22

## 2024-12-22 PROBLEM — E87.1 HYPONATREMIA: Status: ACTIVE | Noted: 2024-12-22

## 2024-12-22 PROBLEM — Z79.01 CHRONIC ANTICOAGULATION: Status: ACTIVE | Noted: 2024-12-22

## 2024-12-22 PROBLEM — G81.90 HEMIPLEGIA: Status: ACTIVE | Noted: 2024-12-22

## 2024-12-22 PROBLEM — Z74.09 REDUCED MOBILITY: Status: ACTIVE | Noted: 2024-12-22

## 2024-12-22 PROBLEM — I48.91 HYPERCOAGULABLE STATE DUE TO ATRIAL FIBRILLATION: Status: ACTIVE | Noted: 2024-12-22

## 2024-12-22 LAB
ALBUMIN SERPL BCP-MCNC: 2.7 G/DL (ref 3.5–5.2)
ALP SERPL-CCNC: 242 U/L (ref 40–150)
ALT SERPL W/O P-5'-P-CCNC: 109 U/L (ref 10–44)
ANION GAP SERPL CALC-SCNC: 10 MMOL/L (ref 8–16)
AST SERPL-CCNC: 50 U/L (ref 10–40)
BASOPHILS # BLD AUTO: 0.07 K/UL (ref 0–0.2)
BASOPHILS NFR BLD: 1.1 % (ref 0–1.9)
BILIRUB SERPL-MCNC: 0.3 MG/DL (ref 0.1–1)
BUN SERPL-MCNC: 30 MG/DL (ref 8–23)
CALCIUM SERPL-MCNC: 9.1 MG/DL (ref 8.7–10.5)
CHLORIDE SERPL-SCNC: 102 MMOL/L (ref 95–110)
CO2 SERPL-SCNC: 22 MMOL/L (ref 23–29)
CREAT SERPL-MCNC: 1.1 MG/DL (ref 0.5–1.4)
DIFFERENTIAL METHOD BLD: ABNORMAL
EOSINOPHIL # BLD AUTO: 0.3 K/UL (ref 0–0.5)
EOSINOPHIL NFR BLD: 5.1 % (ref 0–8)
ERYTHROCYTE [DISTWIDTH] IN BLOOD BY AUTOMATED COUNT: 11.9 % (ref 11.5–14.5)
EST. GFR  (NO RACE VARIABLE): 48.6 ML/MIN/1.73 M^2
GLUCOSE SERPL-MCNC: 184 MG/DL (ref 70–110)
HCT VFR BLD AUTO: 38.2 % (ref 37–48.5)
HGB BLD-MCNC: 12.4 G/DL (ref 12–16)
IMM GRANULOCYTES # BLD AUTO: 0.01 K/UL (ref 0–0.04)
IMM GRANULOCYTES NFR BLD AUTO: 0.2 % (ref 0–0.5)
LYMPHOCYTES # BLD AUTO: 1.8 K/UL (ref 1–4.8)
LYMPHOCYTES NFR BLD: 27.4 % (ref 18–48)
MAGNESIUM SERPL-MCNC: 2.2 MG/DL (ref 1.6–2.6)
MCH RBC QN AUTO: 31.2 PG (ref 27–31)
MCHC RBC AUTO-ENTMCNC: 32.5 G/DL (ref 32–36)
MCV RBC AUTO: 96 FL (ref 82–98)
MONOCYTES # BLD AUTO: 0.6 K/UL (ref 0.3–1)
MONOCYTES NFR BLD: 8.7 % (ref 4–15)
NEUTROPHILS # BLD AUTO: 3.7 K/UL (ref 1.8–7.7)
NEUTROPHILS NFR BLD: 57.5 % (ref 38–73)
NRBC BLD-RTO: 0 /100 WBC
PHOSPHATE SERPL-MCNC: 3.6 MG/DL (ref 2.7–4.5)
PLATELET # BLD AUTO: 214 K/UL (ref 150–450)
PMV BLD AUTO: 10.3 FL (ref 9.2–12.9)
POCT GLUCOSE: 194 MG/DL (ref 70–110)
POCT GLUCOSE: 247 MG/DL (ref 70–110)
POCT GLUCOSE: 264 MG/DL (ref 70–110)
POTASSIUM SERPL-SCNC: 4.1 MMOL/L (ref 3.5–5.1)
PROT SERPL-MCNC: 6 G/DL (ref 6–8.4)
RBC # BLD AUTO: 3.98 M/UL (ref 4–5.4)
SODIUM SERPL-SCNC: 134 MMOL/L (ref 136–145)
WBC # BLD AUTO: 6.46 K/UL (ref 3.9–12.7)

## 2024-12-22 PROCEDURE — 25000003 PHARM REV CODE 250: Performed by: STUDENT IN AN ORGANIZED HEALTH CARE EDUCATION/TRAINING PROGRAM

## 2024-12-22 PROCEDURE — 36415 COLL VENOUS BLD VENIPUNCTURE: CPT

## 2024-12-22 PROCEDURE — 80053 COMPREHEN METABOLIC PANEL: CPT

## 2024-12-22 PROCEDURE — 84100 ASSAY OF PHOSPHORUS: CPT

## 2024-12-22 PROCEDURE — 99233 SBSQ HOSP IP/OBS HIGH 50: CPT | Mod: GC,,, | Performed by: STUDENT IN AN ORGANIZED HEALTH CARE EDUCATION/TRAINING PROGRAM

## 2024-12-22 PROCEDURE — 11000001 HC ACUTE MED/SURG PRIVATE ROOM

## 2024-12-22 PROCEDURE — 83735 ASSAY OF MAGNESIUM: CPT

## 2024-12-22 PROCEDURE — 25000003 PHARM REV CODE 250

## 2024-12-22 PROCEDURE — 85025 COMPLETE CBC W/AUTO DIFF WBC: CPT

## 2024-12-22 PROCEDURE — 63600175 PHARM REV CODE 636 W HCPCS

## 2024-12-22 RX ORDER — INSULIN GLARGINE 100 [IU]/ML
11 INJECTION, SOLUTION SUBCUTANEOUS DAILY
Status: DISCONTINUED | OUTPATIENT
Start: 2024-12-22 | End: 2024-12-26

## 2024-12-22 RX ORDER — HEPARIN SODIUM 5000 [USP'U]/ML
5000 INJECTION, SOLUTION INTRAVENOUS; SUBCUTANEOUS EVERY 8 HOURS
Status: COMPLETED | OUTPATIENT
Start: 2024-12-22 | End: 2024-12-23

## 2024-12-22 RX ORDER — ATORVASTATIN CALCIUM 40 MG/1
40 TABLET, FILM COATED ORAL DAILY
Qty: 90 TABLET | Refills: 3 | Status: SHIPPED | OUTPATIENT
Start: 2024-12-23 | End: 2025-12-23

## 2024-12-22 RX ADMIN — FUROSEMIDE 20 MG: 20 TABLET ORAL at 09:12

## 2024-12-22 RX ADMIN — MUPIROCIN: 20 OINTMENT TOPICAL at 09:12

## 2024-12-22 RX ADMIN — GABAPENTIN 300 MG: 300 CAPSULE ORAL at 03:12

## 2024-12-22 RX ADMIN — PANTOPRAZOLE SODIUM 40 MG: 20 TABLET, DELAYED RELEASE ORAL at 06:12

## 2024-12-22 RX ADMIN — HEPARIN SODIUM 5000 UNITS: 5000 INJECTION INTRAVENOUS; SUBCUTANEOUS at 09:12

## 2024-12-22 RX ADMIN — INSULIN GLARGINE 11 UNITS: 100 INJECTION, SOLUTION SUBCUTANEOUS at 09:12

## 2024-12-22 RX ADMIN — CIPROFLOXACIN HYDROCHLORIDE 250 MG: 250 TABLET, FILM COATED ORAL at 09:12

## 2024-12-22 RX ADMIN — METOPROLOL TARTRATE 50 MG: 50 TABLET, FILM COATED ORAL at 09:12

## 2024-12-22 RX ADMIN — OXYBUTYNIN CHLORIDE 10 MG: 10 TABLET, EXTENDED RELEASE ORAL at 09:12

## 2024-12-22 RX ADMIN — INSULIN ASPART 1 UNITS: 100 INJECTION, SOLUTION INTRAVENOUS; SUBCUTANEOUS at 09:12

## 2024-12-22 RX ADMIN — GABAPENTIN 300 MG: 300 CAPSULE ORAL at 09:12

## 2024-12-22 RX ADMIN — HEPARIN SODIUM 5000 UNITS: 5000 INJECTION INTRAVENOUS; SUBCUTANEOUS at 03:12

## 2024-12-22 RX ADMIN — ATORVASTATIN CALCIUM 40 MG: 40 TABLET, FILM COATED ORAL at 09:12

## 2024-12-22 NOTE — PLAN OF CARE
Problem: Stroke, Ischemic (Includes Transient Ischemic Attack)  Goal: Optimal Coping  Outcome: Progressing     Problem: Stroke, Ischemic (Includes Transient Ischemic Attack)  Goal: Optimal Cerebral Tissue Perfusion  Outcome: Progressing     Problem: Diabetes Comorbidity  Goal: Blood Glucose Level Within Targeted Range  Outcome: Progressing     Problem: Wound  Goal: Skin Health and Integrity  Outcome: Progressing     Problem: Fall Injury Risk  Goal: Absence of Fall and Fall-Related Injury  Outcome: Progressing

## 2024-12-22 NOTE — PROGRESS NOTES
[facility] Doyle - Neurosurgery ([facility])  Vascular Neurology  Comprehensive Stroke Center  Progress Note    Assessment/Plan:     * Stroke due to occlusion of right posterior cerebral artery  Ms. Willa Kim is a 89 y.o. patient transferred from OSH for right PCA (P1) occlusion monitoring. History of afib on Xarelto, CHF (EF 60%), HTN, DM. Presented with LSW. No baseline NIHSS documented from telestroke. CTH with subacute right MCA infarct; no follow up CTA done. MRI brain with right medial temporo-occipital territory infarct (lateral thalamus) with proximal P1 segment PCA occlusion. OOW for TNK, not IR candidate. NIHSS 9 with LSW and not fully crossing midline. Daughter noticed some dysarthria this AM. Suspected embolic/cardio-embolic etiology. Given some evidence of cerebral atrophy, risk of malignant cerebral edema is low but patient should be closely monitored.    Antithrombotics for secondary stroke prevention:  AC timing to be determined on 12/23/24     Statins for secondary stroke prevention and hyperlipidemia, if present:   Statins: Atorvastatin- 40 mg daily    Aggressive risk factor modification: HTN, A-Fib     Rehab efforts: The patient has been evaluated by a stroke team provider and the therapy needs have been fully considered based off the presenting complaints and exam findings. The following therapy evaluations are needed: PT evaluate and treat, OT evaluate and treat, SLP evaluate and treat, PM&R evaluate for appropriate placement, when able     Diagnostics ordered/pending: HgbA1C to assess blood glucose levels, Lipid Profile to assess cholesterol levels, TTE to assess cardiac function/status , TSH to assess thyroid function    VTE prophylaxis: Mechanical prophylaxis: Place SCDs; pending timing to start AC     BP parameters: Infarct: No intervention, SBP <220    Other goals: maintain eunatremia and normothermia     CHF (congestive heart failure)  Resumed home Lasix 20    A-fib  Holding home Xarelto.  Will consider AC timing to resume while in the hospital; previously on amiodarone and diltiazem, which the patient is not taking anymore    GERD (gastroesophageal reflux disease)  Resumed home protonix     Essential hypertension  Stroke risk factor   SBP < 220  Holding home losartan for permissive HTN     Urge incontinence  Resumed home oxybutinin   On purewick     Diabetes mellitus, type II  Diabetic diet; Passed miller   LDSSI   POCG  Started basal glargine 12/21/24 -> went up by 2U to 11 (12/22/24).         12/21/2024 - NAEO, VSS. F/u CTH w/ stable, delineated recent infarct in R. PCA territory. Holding rivaroxaban for the time being. Transaminitis being worked up via liver U/S. Blood glucose above the 140-180 target; added basal glargine.    12/22/2024 - NAEO, VSS. F/u CTH w/ stable, delineated recent infarct in R. PCA territory. Holding rivaroxaban for the time being. Transaminitis being worked up via liver U/S - s/f steatosis. Blood glucose trending higher, went up on basal by 2U.    STROKE DOCUMENTATION        NIH Scale:        Current NIH Stroke Score Values      Flowsheet Row Most Recent Value   Interval shift assessment   1a. Level of Consciousness 0-->Alert, keenly responsive   1b. LOC Questions 0-->Answers both questions correctly   1c. LOC Commands 0-->Performs both tasks correctly   2. Best Gaze 1-->Partial gaze palsy, gaze is abnormal in one or both eyes, but forced deviation or total gaze paresis is not present   3. Visual 0-->No visual loss   4. Facial Palsy 2-->Partial paralysis (total or near-total paralysis of lower face)   5a. Motor Arm, Left 2-->Some effort against gravity, limb cannot get to or maintain (if cued) 90 (or 45) degrees, drifts down to bed, but has some effort against gravity   5b. Motor Arm, Right 0-->No drift, limb holds 90 (or 45) degrees for full 10 secs   6a. Motor Leg, Left 2-->Some effort against gravity, leg falls to bed by 5 secs, but has some effort against gravity   6b.  Motor Leg, Right 0-->No drift, leg holds 30 degree position for full 5 secs   7. Limb Ataxia 0-->Absent   8. Sensory 0-->Normal, no sensory loss   9. Best Language 0-->No aphasia, normal   10. Dysarthria 1-->Mild-to-moderate dysarthria, patient slurs at least some words and, at worst, can be understood with some difficulty   11. Extinction and Inattention (formerly Neglect) 0-->No abnormality   Total (NIH Stroke Scale) 8            Modified Perlita    Hartford Coma Scale:    ABCD2 Score:    [id number] Score:   HAS -BLED Score:   ICH Score:   Hunt & Wilson Classification:      Hemorrhagic change of an Ischemic Stroke: Does this patient have an ischemic stroke with hemorrhagic changes? No     Neurologic Chief Complaint: R. Lateral thalamus and with proximal P1 segment PCA occlusion     Subjective:     Interval History: Patient is seen for follow-up neurological assessment and treatment recommendations:     MATTI, VSS. F/u CTH w/ stable, delineated recent infarct in R. PCA territory. Holding rivaroxaban for the time being. Transaminitis being worked up via liver U/S - s/f steatosis. Blood glucose above the 140-180 target; added basal glargine and went up by 2U today.    HPI, Past Medical, Family, and Social History remains the same as documented in the initial encounter.     Review of Systems   Reason unable to perform ROS: See HPI.     Scheduled Meds:   atorvastatin  40 mg Oral Daily    ciprofloxacin HCl  250 mg Oral Q12H    furosemide  20 mg Oral Every other day    gabapentin  300 mg Oral TID    insulin glargine U-100  11 Units Subcutaneous Daily    metoprolol tartrate  50 mg Oral BID    mupirocin   Nasal BID    oxybutynin  10 mg Oral Daily    pantoprazole  40 mg Oral QAM     Continuous Infusions:  PRN Meds:  Current Facility-Administered Medications:     acetaminophen, 650 mg, Oral, Q6H PRN    bisacodyL, 10 mg, Rectal, Daily PRN    dextrose 50%, 12.5 g, Intravenous, PRN    dextrose 50%, 25 g, Intravenous, PRN     "glucagon (human recombinant), 1 mg, Intramuscular, PRN    glucose, 16 g, Oral, PRN    glucose, 24 g, Oral, PRN    insulin aspart U-100, 0-5 Units, Subcutaneous, QID (AC + HS) PRN    labetalol, 10 mg, Intravenous, Q15 Min PRN    sodium chloride 0.9%, 10 mL, Intravenous, PRN    Objective:     Vital Signs (Most Recent):  Temp: 97.8 °F (36.6 °C) (12/22/24 1201)  Pulse: 68 (12/22/24 1201)  Resp: 18 (12/22/24 1201)  BP: (!) 120/58 (12/22/24 1201)  SpO2: 95 % (12/22/24 1201)  BP Location: Right arm    Vital Signs Range (Last 24H):  Temp:  [97.4 °F (36.3 °C)-98.2 °F (36.8 °C)]   Pulse:  [56-75]   Resp:  [16-18]   BP: (118-162)/(58-69)   SpO2:  [94 %-97 %]   BP Location: Right arm       Physical Exam  Neurological:      Comments: See Neurological Exam             Neurological Exam:   LOC: alert  Attention Span: Good   Language: No aphasia  Articulation: Dysarthria  Orientation: Person, Place, Time   Visual Fields: Full and Visual neglect  EOM (CN III, IV, VI): Gaze preference  right  Motor: Arm left  Plegia 0/5  Leg left  Paresis: 1/5  Arm right  Normal 5/5  Leg right Normal 5/5  Sensation: Joel-anesthesia left    Laboratory:  CMP:   Recent Labs   Lab 12/22/24  0833   CALCIUM 9.1   ALBUMIN 2.7*   PROT 6.0   *   K 4.1   CO2 22*      BUN 30*   CREATININE 1.1   ALKPHOS 242*   *   AST 50*   BILITOT 0.3     CBC:   Recent Labs   Lab 12/22/24  0833   WBC 6.46   RBC 3.98*   HGB 12.4   HCT 38.2      MCV 96   MCH 31.2*   MCHC 32.5     Lipid Panel:   Recent Labs   Lab 12/20/24  0717   CHOL 106*   LDLCALC 56.2*   HDL 37*   TRIG 64     Coagulation: No results for input(s): "PT", "INR", "APTT" in the last 168 hours.  Hgb A1C:   Recent Labs   Lab 12/20/24  0717   HGBA1C 7.6*     TSH:   Recent Labs   Lab 12/21/24  0340   TSH 0.437       Diagnostic Results     Brain imaging:  CTH with subacute right MCA infarct      MRI brain with right medial temporo-occipital territory infarct (lateral thalamus) with proximal P1 " segment PCA occlusion    Cardiac Imaging     Left Ventricle: The left ventricle is normal in size. Ventricular mass is normal. Normal wall thickness. There is concentric remodeling. Normal wall motion. There is normal systolic function with a visually estimated ejection fraction of 60 - 65%. There is indeterminate diastolic function. Elevated left ventricular filling pressure.    Right Ventricle: Normal right ventricular cavity size. Wall thickness is normal. Systolic function is normal.    Left Atrium: Agitated saline study of the atrial septum is negative after vasalva maneuver, suggesting absence of intracardiac shunt at the atrial level.    Aortic Valve: There is mild annular calcification present.    Mitral Valve: There is moderate mitral annular calcification present.    Pulmonary Artery: Pulmonary artery pressure could not be estimated.    IVC/SVC: Normal venous pressure at 3 mmHg.        Ramesh Burgos MD  Comprehensive Stroke Center  Department of Vascular Neurology   [facility] Neurosurgery \[facility]\""

## 2024-12-22 NOTE — SUBJECTIVE & OBJECTIVE
Neurologic Chief Complaint: R. Lateral thalamus and with proximal P1 segment PCA occlusion     Subjective:     Interval History: Patient is seen for follow-up neurological assessment and treatment recommendations:     JORGEO, VSS. F/u CTH w/ stable, delineated recent infarct in R. PCA territory. Holding rivaroxaban for the time being. Transaminitis being worked up via liver U/S - s/f steatosis. Blood glucose above the 140-180 target; added basal glargine and went up by 2U today.    HPI, Past Medical, Family, and Social History remains the same as documented in the initial encounter.     Review of Systems   Reason unable to perform ROS: See HPI.     Scheduled Meds:   atorvastatin  40 mg Oral Daily    ciprofloxacin HCl  250 mg Oral Q12H    furosemide  20 mg Oral Every other day    gabapentin  300 mg Oral TID    insulin glargine U-100  11 Units Subcutaneous Daily    metoprolol tartrate  50 mg Oral BID    mupirocin   Nasal BID    oxybutynin  10 mg Oral Daily    pantoprazole  40 mg Oral QAM     Continuous Infusions:  PRN Meds:  Current Facility-Administered Medications:     acetaminophen, 650 mg, Oral, Q6H PRN    bisacodyL, 10 mg, Rectal, Daily PRN    dextrose 50%, 12.5 g, Intravenous, PRN    dextrose 50%, 25 g, Intravenous, PRN    glucagon (human recombinant), 1 mg, Intramuscular, PRN    glucose, 16 g, Oral, PRN    glucose, 24 g, Oral, PRN    insulin aspart U-100, 0-5 Units, Subcutaneous, QID (AC + HS) PRN    labetalol, 10 mg, Intravenous, Q15 Min PRN    sodium chloride 0.9%, 10 mL, Intravenous, PRN    Objective:     Vital Signs (Most Recent):  Temp: 97.8 °F (36.6 °C) (12/22/24 1201)  Pulse: 68 (12/22/24 1201)  Resp: 18 (12/22/24 1201)  BP: (!) 120/58 (12/22/24 1201)  SpO2: 95 % (12/22/24 1201)  BP Location: Right arm    Vital Signs Range (Last 24H):  Temp:  [97.4 °F (36.3 °C)-98.2 °F (36.8 °C)]   Pulse:  [56-75]   Resp:  [16-18]   BP: (118-162)/(58-69)   SpO2:  [94 %-97 %]   BP Location: Right arm       Physical  "Exam  Neurological:      Comments: See Neurological Exam             Neurological Exam:   LOC: alert  Attention Span: Good   Language: No aphasia  Articulation: Dysarthria  Orientation: Person, Place, Time   Visual Fields: Full and Visual neglect  EOM (CN III, IV, VI): Gaze preference  right  Motor: Arm left  Plegia 0/5  Leg left  Paresis: 1/5  Arm right  Normal 5/5  Leg right Normal 5/5  Sensation: Joel-anesthesia left    Laboratory:  CMP:   Recent Labs   Lab 12/22/24  0833   CALCIUM 9.1   ALBUMIN 2.7*   PROT 6.0   *   K 4.1   CO2 22*      BUN 30*   CREATININE 1.1   ALKPHOS 242*   *   AST 50*   BILITOT 0.3     CBC:   Recent Labs   Lab 12/22/24  0833   WBC 6.46   RBC 3.98*   HGB 12.4   HCT 38.2      MCV 96   MCH 31.2*   MCHC 32.5     Lipid Panel:   Recent Labs   Lab 12/20/24  0717   CHOL 106*   LDLCALC 56.2*   HDL 37*   TRIG 64     Coagulation: No results for input(s): "PT", "INR", "APTT" in the last 168 hours.  Hgb A1C:   Recent Labs   Lab 12/20/24  0717   HGBA1C 7.6*     TSH:   Recent Labs   Lab 12/21/24  0340   TSH 0.437       Diagnostic Results     Brain imaging:  CTH with subacute right MCA infarct      MRI brain with right medial temporo-occipital territory infarct (lateral thalamus) with proximal P1 segment PCA occlusion    Cardiac Imaging     Left Ventricle: The left ventricle is normal in size. Ventricular mass is normal. Normal wall thickness. There is concentric remodeling. Normal wall motion. There is normal systolic function with a visually estimated ejection fraction of 60 - 65%. There is indeterminate diastolic function. Elevated left ventricular filling pressure.    Right Ventricle: Normal right ventricular cavity size. Wall thickness is normal. Systolic function is normal.    Left Atrium: Agitated saline study of the atrial septum is negative after vasalva maneuver, suggesting absence of intracardiac shunt at the atrial level.    Aortic Valve: There is mild annular " calcification present.    Mitral Valve: There is moderate mitral annular calcification present.    Pulmonary Artery: Pulmonary artery pressure could not be estimated.    IVC/SVC: Normal venous pressure at 3 mmHg.

## 2024-12-22 NOTE — ASSESSMENT & PLAN NOTE
Diabetic diet; Passed miller   LDSSI   POCG  Started basal glargine 12/21/24 -> went up by 2U to 11 (12/22/24).

## 2024-12-23 PROBLEM — I48.91 HYPERCOAGULABLE STATE DUE TO ATRIAL FIBRILLATION: Status: RESOLVED | Noted: 2024-12-22 | Resolved: 2024-12-23

## 2024-12-23 PROBLEM — D68.69 HYPERCOAGULABLE STATE DUE TO ATRIAL FIBRILLATION: Status: RESOLVED | Noted: 2024-12-22 | Resolved: 2024-12-23

## 2024-12-23 LAB
ALBUMIN SERPL BCP-MCNC: 2.5 G/DL (ref 3.5–5.2)
ALP SERPL-CCNC: 248 U/L (ref 40–150)
ALT SERPL W/O P-5'-P-CCNC: 92 U/L (ref 10–44)
ANION GAP SERPL CALC-SCNC: 11 MMOL/L (ref 8–16)
ANISOCYTOSIS BLD QL SMEAR: SLIGHT
AST SERPL-CCNC: 43 U/L (ref 10–40)
BACTERIA UR CULT: ABNORMAL
BASOPHILS # BLD AUTO: 0.04 K/UL (ref 0–0.2)
BASOPHILS NFR BLD: 0.5 % (ref 0–1.9)
BILIRUB SERPL-MCNC: 0.3 MG/DL (ref 0.1–1)
BUN SERPL-MCNC: 29 MG/DL (ref 8–23)
CALCIUM SERPL-MCNC: 8.6 MG/DL (ref 8.7–10.5)
CHLORIDE SERPL-SCNC: 102 MMOL/L (ref 95–110)
CO2 SERPL-SCNC: 20 MMOL/L (ref 23–29)
CREAT SERPL-MCNC: 1.1 MG/DL (ref 0.5–1.4)
DIFFERENTIAL METHOD BLD: ABNORMAL
EOSINOPHIL # BLD AUTO: 0.4 K/UL (ref 0–0.5)
EOSINOPHIL NFR BLD: 4.3 % (ref 0–8)
ERYTHROCYTE [DISTWIDTH] IN BLOOD BY AUTOMATED COUNT: 11.9 % (ref 11.5–14.5)
EST. GFR  (NO RACE VARIABLE): 48.6 ML/MIN/1.73 M^2
GLUCOSE SERPL-MCNC: 184 MG/DL (ref 70–110)
HCT VFR BLD AUTO: 34.9 % (ref 37–48.5)
HGB BLD-MCNC: 12.2 G/DL (ref 12–16)
HYPOCHROMIA BLD QL SMEAR: ABNORMAL
IMM GRANULOCYTES # BLD AUTO: 0.02 K/UL (ref 0–0.04)
IMM GRANULOCYTES NFR BLD AUTO: 0.2 % (ref 0–0.5)
LYMPHOCYTES # BLD AUTO: 2.7 K/UL (ref 1–4.8)
LYMPHOCYTES NFR BLD: 33.7 % (ref 18–48)
MAGNESIUM SERPL-MCNC: 2 MG/DL (ref 1.6–2.6)
MCH RBC QN AUTO: 31.7 PG (ref 27–31)
MCHC RBC AUTO-ENTMCNC: 35 G/DL (ref 32–36)
MCV RBC AUTO: 91 FL (ref 82–98)
MONOCYTES # BLD AUTO: 0.7 K/UL (ref 0.3–1)
MONOCYTES NFR BLD: 8.5 % (ref 4–15)
NEUTROPHILS # BLD AUTO: 4.3 K/UL (ref 1.8–7.7)
NEUTROPHILS NFR BLD: 52.8 % (ref 38–73)
NRBC BLD-RTO: 0 /100 WBC
PHOSPHATE SERPL-MCNC: 3.3 MG/DL (ref 2.7–4.5)
PLATELET # BLD AUTO: 191 K/UL (ref 150–450)
PLATELET BLD QL SMEAR: ABNORMAL
PMV BLD AUTO: 11.6 FL (ref 9.2–12.9)
POCT GLUCOSE: 212 MG/DL (ref 70–110)
POCT GLUCOSE: 229 MG/DL (ref 70–110)
POCT GLUCOSE: 240 MG/DL (ref 70–110)
POCT GLUCOSE: 249 MG/DL (ref 70–110)
POTASSIUM SERPL-SCNC: 3.9 MMOL/L (ref 3.5–5.1)
PROT SERPL-MCNC: 5.6 G/DL (ref 6–8.4)
RBC # BLD AUTO: 3.85 M/UL (ref 4–5.4)
SODIUM SERPL-SCNC: 133 MMOL/L (ref 136–145)
WBC # BLD AUTO: 8.1 K/UL (ref 3.9–12.7)
WBC TOXIC VACUOLES BLD QL SMEAR: PRESENT

## 2024-12-23 PROCEDURE — 97530 THERAPEUTIC ACTIVITIES: CPT

## 2024-12-23 PROCEDURE — 80053 COMPREHEN METABOLIC PANEL: CPT

## 2024-12-23 PROCEDURE — 97112 NEUROMUSCULAR REEDUCATION: CPT

## 2024-12-23 PROCEDURE — 25000003 PHARM REV CODE 250

## 2024-12-23 PROCEDURE — 63600175 PHARM REV CODE 636 W HCPCS

## 2024-12-23 PROCEDURE — 11000001 HC ACUTE MED/SURG PRIVATE ROOM

## 2024-12-23 PROCEDURE — 85025 COMPLETE CBC W/AUTO DIFF WBC: CPT

## 2024-12-23 PROCEDURE — 92526 ORAL FUNCTION THERAPY: CPT

## 2024-12-23 PROCEDURE — 99233 SBSQ HOSP IP/OBS HIGH 50: CPT | Mod: ,,, | Performed by: STUDENT IN AN ORGANIZED HEALTH CARE EDUCATION/TRAINING PROGRAM

## 2024-12-23 PROCEDURE — 97535 SELF CARE MNGMENT TRAINING: CPT

## 2024-12-23 PROCEDURE — 36415 COLL VENOUS BLD VENIPUNCTURE: CPT

## 2024-12-23 PROCEDURE — 84100 ASSAY OF PHOSPHORUS: CPT

## 2024-12-23 PROCEDURE — 83735 ASSAY OF MAGNESIUM: CPT

## 2024-12-23 PROCEDURE — 25000003 PHARM REV CODE 250: Performed by: STUDENT IN AN ORGANIZED HEALTH CARE EDUCATION/TRAINING PROGRAM

## 2024-12-23 PROCEDURE — 92507 TX SP LANG VOICE COMM INDIV: CPT

## 2024-12-23 RX ADMIN — INSULIN ASPART 2 UNITS: 100 INJECTION, SOLUTION INTRAVENOUS; SUBCUTANEOUS at 04:12

## 2024-12-23 RX ADMIN — GABAPENTIN 300 MG: 300 CAPSULE ORAL at 08:12

## 2024-12-23 RX ADMIN — CIPROFLOXACIN HYDROCHLORIDE 250 MG: 250 TABLET, FILM COATED ORAL at 09:12

## 2024-12-23 RX ADMIN — MUPIROCIN: 20 OINTMENT TOPICAL at 08:12

## 2024-12-23 RX ADMIN — OXYBUTYNIN CHLORIDE 10 MG: 10 TABLET, EXTENDED RELEASE ORAL at 08:12

## 2024-12-23 RX ADMIN — INSULIN GLARGINE 11 UNITS: 100 INJECTION, SOLUTION SUBCUTANEOUS at 08:12

## 2024-12-23 RX ADMIN — HEPARIN SODIUM 5000 UNITS: 5000 INJECTION INTRAVENOUS; SUBCUTANEOUS at 05:12

## 2024-12-23 RX ADMIN — RIVAROXABAN 15 MG: 15 TABLET, FILM COATED ORAL at 05:12

## 2024-12-23 RX ADMIN — METOPROLOL TARTRATE 50 MG: 50 TABLET, FILM COATED ORAL at 08:12

## 2024-12-23 RX ADMIN — METOPROLOL TARTRATE 50 MG: 50 TABLET, FILM COATED ORAL at 09:12

## 2024-12-23 RX ADMIN — HEPARIN SODIUM 5000 UNITS: 5000 INJECTION INTRAVENOUS; SUBCUTANEOUS at 02:12

## 2024-12-23 RX ADMIN — GABAPENTIN 300 MG: 300 CAPSULE ORAL at 09:12

## 2024-12-23 RX ADMIN — GABAPENTIN 300 MG: 300 CAPSULE ORAL at 02:12

## 2024-12-23 RX ADMIN — CIPROFLOXACIN HYDROCHLORIDE 250 MG: 250 TABLET, FILM COATED ORAL at 08:12

## 2024-12-23 RX ADMIN — MUPIROCIN: 20 OINTMENT TOPICAL at 09:12

## 2024-12-23 RX ADMIN — INSULIN ASPART 1 UNITS: 100 INJECTION, SOLUTION INTRAVENOUS; SUBCUTANEOUS at 09:12

## 2024-12-23 RX ADMIN — PANTOPRAZOLE SODIUM 40 MG: 20 TABLET, DELAYED RELEASE ORAL at 06:12

## 2024-12-23 RX ADMIN — ATORVASTATIN CALCIUM 40 MG: 40 TABLET, FILM COATED ORAL at 08:12

## 2024-12-23 NOTE — PT/OT/SLP PROGRESS
Occupational Therapy   Co Treatment    Name: Willa Kim  MRN: [id number]  Admitting Diagnosis:  Stroke due to occlusion of right posterior cerebral artery       Recommendations:     Discharge Recommendations: Moderate Intensity Therapy  Discharge Equipment Recommendations:  hospital bed, lift device, shower chair  Barriers to discharge:  Other (Comment) (increased skilled A needed)    Assessment:     Willa Kim is a 89 y.o. female with a medical diagnosis of Stroke due to occlusion of right posterior cerebral artery.  She presents with decreased self-care and functional mobility 2/2 AMS. Performance deficits affecting function are weakness, impaired functional mobility, decreased safety awareness, impaired coordination, impaired endurance, gait instability, decreased coordination, impaired fine motor, impaired balance, decreased upper extremity function, abnormal tone, impaired self care skills, visual deficits, decreased lower extremity function, decreased ROM. Pt continues to require significant assistance 2/2 significant LUE/LE weakness. However, pt attempting to assist with RUE/LE this date. Pt continues to display decreased visual tracking with eyes attending to midline with increased verbal and tactile cues, though pt not noted to track across midline. Pt with LUE compensatory shoulder flexion ~60 degrees and elbow flexion 3/5  this date with 1 finger of separation at shoulder joint. Pt completing hand washing of LUE with minimal tactile cues. Pt continues to require increased assistance with sitting EOB 2/2 decreased postural control and spatial awareness. Pt motivated to participate in session. Patient continues to demonstrate the need for moderate intensity therapy on a daily basis post acute exhibited by decreased independence with self-care and functional mobility    Co-Treatment conducted w/ PT to promote patient safety and 2/2 need for 2 skilled therapists to enhance therapeutic effects  of session.        Rehab Prognosis:  Good; patient would benefit from acute skilled OT services to address these deficits and reach maximum level of function.       Plan:     Patient to be seen 4 x/week to address the above listed problems via self-care/home management, therapeutic activities, therapeutic exercises, neuromuscular re-education  Plan of Care Expires: 01/20/25  Plan of Care Reviewed with: patient, daughter    Subjective     Chief Complaint: n/a  Patient/Family Comments/goals: increase independence  Pain/Comfort:  Pain Rating 1: 0/10    Objective:     Communicated with: Nursing prior to session.  Patient found HOB elevated with bed alarm, SCD, telemetry upon OT entry to room.    General Precautions: Standard, fall, aspiration    Orthopedic Precautions:N/A  Braces: N/A  Respiratory Status: Room air     Occupational Performance:     Bed Mobility:    Patient completed Scooting/Bridging with moderate assistance  Patient completed Supine to Sit with maximal assistance and 2 persons  Patient completed Sit to Supine with maximal assistance and 2 persons   Pt seated EOB with maximal assistance 2/2 posterior lean, pt assisting to maintain upright posture with BUEs and maximal verbal and tactile cues    Functional Mobility/Transfers:  Patient completed Sit <> Stand Transfer with total assistance and of 2 persons  with  no assistive device with pt achieving 70% upright stand with maximal verbal and tactile cues  Functional Mobility: deferred 2/2 increased assistance needed to maintain static standing    Activities of Daily Living:  Grooming: minimum assistance for thoroughness with hand washing, pt able to localize LUE for handwashing; pt utilizing RUE to wash face while seated EOB   Toileting: maximal assistance for for nathan-care and pure wick management     Therapeutic exercise:  Pt completing 5 modified sit ups with moderate assistance and BUE support, pt benefiting from maximal verbal and tactile cues to  increase postural control and midline orientation awareness    [facility] 6 Click ADL: 12    Treatment & Education:  Session this date targeted self-care, therapeutic activities, and therapeutic exercise to increase pt's independence  Pt educated on OT roles, POC, call button for assistance  Pt and daughter educated on LUE positioning for joint protection, as well as distal P/AAROM HEP to prevent adverse events, verbalizing understanding    Patient left HOB elevated with all lines intact, call button in reach, bed alarm on, and daughter present    GOALS:   Multidisciplinary Problems       Occupational Therapy Goals          Problem: Occupational Therapy    Goal Priority Disciplines Outcome Interventions   Occupational Therapy Goal     OT, PT/OT Progressing    Description: Goals to be met by: 1/20/24     Patient will increase functional independence with ADLs by performing:    UE Dressing with Moderate Assistance.  LE Dressing with Moderate Assistance.  Grooming while EOB with Minimal Assistance.  Toileting from bedside commode with Maximum Assistance for hygiene and clothing management.   Sitting at edge of bed x10 minutes with Supervision.  Supine to sit with Supervision.  Stand pivot transfers with Maximum Assistance.  Step transfer with Maximum Assistance  Toilet transfer to bedside commode with Maximum Assistance.                         Time Tracking:     OT Date of Treatment: 12/23/24  OT Start Time: 1123  OT Stop Time: 1146  OT Total Time (min): 23 min    Billable Minutes:Self Care/Home Management 15  Therapeutic Activity 8               12/23/2024

## 2024-12-23 NOTE — PT/OT/SLP PROGRESS
Speech Language Pathology Treatment    Patient Name:  Willa Kim   MRN:  [id number]  Admitting Diagnosis: Stroke due to occlusion of right posterior cerebral artery    Recommendations:                 General Recommendations:  Speech/language therapy and Cognitive-linguistic therapy  Diet recommendations:  Regular Diet - IDDSI Level 7, Liquid Diet Level: Thin liquids - IDDSI Level 0   Aspiration Precautions: Assistance with meals, Feed only when awake/alert, Frequent oral care, HOB to 90 degrees, Meds whole 1 at a time, Small bites/sips, and Standard aspiration precautions   General Precautions: Standard, aspiration, fall  Communication strategies:  provide increased time to answer    Assessment:     Willa Kim is a 89 y.o. female with an SLP diagnosis of Cognitive-Linguistic Impairment.  She presents with a functional oropharyngeal swallow.    Subjective     Pt resting upon arrival but easily awoken. Pt agreeable to participate in session. Nursing cleared.      Pain/Comfort:  Pain Rating 1: 0/10    Respiratory Status: Room air    Objective:     Has the patient been evaluated by SLP for swallowing?   Yes  Keep patient NPO? No   Current Respiratory Status:        Pt seen for diet check and speech therapy. Pt resting upon arrival but easily aroused. Pt oriented to self, place, and time. Pt reporting change in vision. L neglect present. Pt only able to identify objects when placed in R visional field. Pt with difficulty tracking speaker. Pt was unable to fully turn neck to the L to scan full visual field. Pt completing confrontational naming task with 100% accuracy. Pt naming described objects with 100% accuracy. Pt accurately answering questions related to past events. Pt's regular breakfast tray present. SLP administered trials of thin liquids and solids from tray. Pt with functional mastication with full oral clearance. No overt signs of aspiration. Pt is tolerating diet. Recommend continuing  regular diet with thin liquids. SLP provided education regarding role of SLP, aspiration precautions, speech strategies, recommendations, and SLP POC. Pt's daughter expressed understanding. SLP will continue to follow.     Goals:   Multidisciplinary Problems       SLP Goals          Problem: SLP    Goal Priority Disciplines Outcome   SLP Goal     SLP Progressing   Description: Speech Language Pathology Goals  Goals expected to be met by 1/3    1. Pt will tolerate least restrictive and safest diet to maintain hydration and nutritional needs without signs of airway compromise.  2. Pt will orient x4  3. Pt will answer complex y/n questions with 80% accuracy  4. Pt will complete functional naming tasks with 80% accuracy  5. Pt will participate in ongoing assessment of reading, writing, and visual-spatial skills.                               Plan:     Patient to be seen:  3 x/week   Plan of Care expires:  01/17/25  Plan of Care reviewed with:  patient, daughter   SLP Follow-Up:  Yes       Discharge recommendations:  Moderate Intensity Therapy     Time Tracking:     SLP Treatment Date:   12/23/24  Speech Start Time:  1015  Speech Stop Time:  1052     Speech Total Time (min):  37 min    Billable Minutes: Speech Therapy Individual 14, Treatment Swallowing Dysfunction 15, and Self Care/Home Management Training 8    12/23/2024

## 2024-12-23 NOTE — ASSESSMENT & PLAN NOTE
Patient with paroxysmal (<7 days) atrial fibrillation which is controlled currently with Beta Blocker. Patient is currently in sinus rhythm.[id number] Score: 5. Patient is anticoagulated with Xarelto 15 mg po daily with dinner (resume tonight)

## 2024-12-23 NOTE — ASSESSMENT & PLAN NOTE
Patient's FSGs are controlled on current medication regimen.  Last A1c reviewed-   Lab Results   Component Value Date    HGBA1C 7.6 (H) 12/20/2024     Most recent fingerstick glucose reviewed-   Recent Labs   Lab 12/22/24  2057 12/23/24  0724 12/23/24  1208 12/23/24  1614   POCTGLUCOSE 247* 229* 240* 249*     Current correctional scale  Low  Maintain anti-hyperglycemic dose as follows-   Antihyperglycemics (From admission, onward)      Start     Stop Route Frequency Ordered    12/22/24 0900  insulin glargine U-100 (Lantus) pen 11 Units         -- SubQ Daily 12/22/24 0740    12/19/24 2307  insulin aspart U-100 pen 0-5 Units         -- SubQ Before meals & nightly PRN 12/19/24 2207          Hold Oral hypoglycemics while patient is in the hospital.

## 2024-12-23 NOTE — PT/OT/SLP PROGRESS
"Physical Therapy Treatment    Patient Name:  Willa Kim   MRN:  [id number]    Recommendations:     Discharge Recommendations: Moderate Intensity Therapy  Discharge Equipment Recommendations: hospital bed, lift device, shower chair  Barriers to discharge:  increased need for assistance    Assessment:     Willa Kim is a 89 y.o. 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  She presents with the following impairments/functional limitations: weakness, impaired endurance, decreased lower extremity function, decreased upper extremity function, visual deficits, impaired balance, impaired functional mobility, impaired self care skills, decreased safety awareness, impaired coordination, impaired fine motor, gait instability. Pt agreeable to therapy this AM and tolerated session well. Pt showing slightly improved functional mobility this date with her showing slightly improved motor output and decreased L sided neglect. Pt continues to show impaired core core strength and impaired balance. Standing trial was attempted but pt only able to obtain ~50% hip clearance from bed before needing a rest break. Pt would continue to benefit from overall strengthening, balance training, and transfer training at this time to maximize her functional mobility    Rehab Prognosis: Good; patient would benefit from acute skilled PT services to address these deficits and reach maximum level of function.    Recent Surgery: * No surgery found *      Plan:     During this hospitalization, patient to be seen 4 x/week to address the identified rehab impairments via gait training, therapeutic activities, therapeutic exercises, neuromuscular re-education and progress toward the following goals:    Plan of Care Expires:  01/20/25    Subjective     Chief Complaint: none  Patient/Family Comments/goals: "I'm going to go back to the casino"  Pain/Comfort:  Pain Rating 1: 0/10  Pain " Rating Post-Intervention 1: 0/10      Objective:     Communicated with RN prior to session.  Patient found HOB elevated with bed alarm, PICC line, SCD, telemetry upon PT entry to room.     General Precautions: Standard, fall, aspiration  Orthopedic Precautions: N/A  Braces: N/A  Respiratory Status: Room air     Functional Mobility:  Bed Mobility:     Rolling Left:  maximal assistance  Supine to Sit: maximal assistance and of x2 persons  Sit to Supine: maximal assistance and of x2 persons  Transfers:     Sit to Stand:  total assistance, of x2 persons, and 50% of hip clearance observed with hand-held assist  Balance: static sitting at EOB-Max A      AM-PAC 6 CLICK MOBILITY  Turning over in bed (including adjusting bedclothes, sheets and blankets)?: 2  Sitting down on and standing up from a chair with arms (e.g., wheelchair, bedside commode, etc.): 2  Moving from lying on back to sitting on the side of the bed?: 2  Moving to and from a bed to a chair (including a wheelchair)?: 1  Need to walk in hospital room?: 1  Climbing 3-5 steps with a railing?: 1  Basic Mobility Total Score: 9       Treatment & Education:  Patient educated on role of acute care PT and PT POC, safety while in hospital including calling nurse for mobility, and call light usage.  Pt educated on importance of maximal participation in therapy session and OOB mobility in order to reduce negative effects of prolonged sedentary positioning.   Answered all questions within PT scope of practice and addressed functional mobility concerns    Pt performed x 8 trunk flex<>ext with HHA , pt instructed to find midline and use core muscles to help stabilize herself  Pt performed x 10 BL LAQ    Patient left HOB elevated with all lines intact, call button in reach, bed alarm on, RN notified, and daughter present..    GOALS:   Multidisciplinary Problems       Physical Therapy Goals          Problem: Physical Therapy    Goal Priority Disciplines Outcome Interventions    Physical Therapy Goal     PT, PT/OT Progressing    Description: Goals to be met by: 2025     Patient will increase functional independence with mobility by performin. Supine to sit with Moderate Assistance  2. Sit to supine with Moderate Assistance  3. Sit to stand transfer with Maximum Assistance  4. Bed to chair transfer with Maximum Assistance using LRAD  5. Gait  x 5 feet with Maximum Assistance using LRAD.   6. Sitting at edge of bed x5 minutes with Minimal Assistance  7. Lower extremity exercise program x15 reps per handout, with assistance as needed    Hospital Bed - Patient requires a hospital bed for positioning of the body in ways that are not feasible with an ordinary bed. The patient requires special positioning for pain relief, limited mobility, and/or being unable to independently make changes in body position without the use of a hospital bed. Pillows and wedges will not be adequate for resolving these positional issues.                            Time Tracking:     PT Received On: 24  PT Start Time: 1123     PT Stop Time: 1148  PT Total Time (min): 25 min     Billable Minutes: Therapeutic Activity 13 and Neuromuscular Re-education 12    Treatment Type: Treatment  PT/PTA: PT     Number of PTA visits since last PT visit: 0     2024

## 2024-12-23 NOTE — ASSESSMENT & PLAN NOTE
Ms. Willa Kim is a 89 y.o. patient transferred from OSH for right PCA (P1) occlusion monitoring. History of afib on Xarelto, CHF (EF 60%), HTN, DM. Presented with LSW. No baseline NIHSS documented from telestroke. CTH with subacute right MCA infarct; no follow up CTA done. MRI brain with right medial temporo-occipital territory infarct (lateral thalamus) with proximal P1 segment PCA occlusion. OOW for TNK, not IR candidate. NIHSS 9 with LSW and not fully crossing midline. Daughter noticed some dysarthria this AM. Suspected embolic/cardio-embolic etiology. Given some evidence of cerebral atrophy, risk of malignant cerebral edema is low but patient should be closely monitored.    Antithrombotics for secondary stroke prevention:  Xarelto 15 mg po daily with dinner    Statins for secondary stroke prevention and hyperlipidemia, if present:   Statins: Atorvastatin- 40 mg daily    Aggressive risk factor modification: HTN, A-Fib     Rehab efforts: The patient has been evaluated by a stroke team provider and the therapy needs have been fully considered based off the presenting complaints and exam findings. The following therapy evaluations are needed: PT evaluate and treat, OT evaluate and treat, SLP evaluate and treat, PM&R evaluate for appropriate placement, when able     Diagnostics ordered/pending: None     VTE prophylaxis: Mechanical prophylaxis: Place SCDs; Xarelto as above    BP parameters: Infarct: No intervention, SBP <220

## 2024-12-23 NOTE — PROGRESS NOTES
[facility] Doyle - Neurosurgery ([facility])  Vascular Neurology  Comprehensive Stroke Center  Progress Note    Assessment/Plan:     * Stroke due to occlusion of right posterior cerebral artery  Ms. Willa Kim is a 89 y.o. patient transferred from OSH for right PCA (P1) occlusion monitoring. History of afib on Xarelto, CHF (EF 60%), HTN, DM. Presented with LSW. No baseline NIHSS documented from telestroke. CTH with subacute right MCA infarct; no follow up CTA done. MRI brain with right medial temporo-occipital territory infarct (lateral thalamus) with proximal P1 segment PCA occlusion. OOW for TNK, not IR candidate. NIHSS 9 with LSW and not fully crossing midline. Daughter noticed some dysarthria this AM. Suspected embolic/cardio-embolic etiology. Given some evidence of cerebral atrophy, risk of malignant cerebral edema is low but patient should be closely monitored.    Antithrombotics for secondary stroke prevention:  Xarelto 15 mg po daily with dinner    Statins for secondary stroke prevention and hyperlipidemia, if present:   Statins: Atorvastatin- 40 mg daily    Aggressive risk factor modification: HTN, A-Fib     Rehab efforts: The patient has been evaluated by a stroke team provider and the therapy needs have been fully considered based off the presenting complaints and exam findings. The following therapy evaluations are needed: PT evaluate and treat, OT evaluate and treat, SLP evaluate and treat, PM&R evaluate for appropriate placement, when able     Diagnostics ordered/pending: None     VTE prophylaxis: Mechanical prophylaxis: Place SCDs; Xarelto as above    BP parameters: Infarct: No intervention, SBP <220    CHF (congestive heart failure)  Resumed home Lasix 20    A-fib  Patient with paroxysmal (<7 days) atrial fibrillation which is controlled currently with Beta Blocker. Patient is currently in sinus rhythm.[id number] Score: 5. Patient is anticoagulated with Xarelto 15 mg po daily with dinner (resume  tonight)      GERD (gastroesophageal reflux disease)  Resumed home protonix     Essential hypertension  Patients blood pressure range in the last 24 hours was: BP  Min: 101/58  Max: 162/69.The patient's inpatient anti-hypertensive regimen is listed below:  Current Antihypertensives  labetalol 20 mg/4 mL (5 mg/mL) IV syring, Every 15 min PRN, Intravenous  furosemide tablet 20 mg, Every other day, Oral  metoprolol tartrate (LOPRESSOR) tablet 50 mg, 2 times daily, Oral    Plan  - BP is controlled, no changes needed to their regimen    Urge incontinence  Resumed home oxybutinin   [facility]     Diabetes mellitus, type II  Patient's FSGs are controlled on current medication regimen.  Last A1c reviewed-   Lab Results   Component Value Date    HGBA1C 7.6 (H) 12/20/2024     Most recent fingerstick glucose reviewed-   Recent Labs   Lab 12/22/24  2057 12/23/24  0724 12/23/24  1208 12/23/24  1614   POCTGLUCOSE 247* 229* 240* 249*     Current correctional scale  Low  Maintain anti-hyperglycemic dose as follows-   Antihyperglycemics (From admission, onward)      Start     Stop Route Frequency Ordered    12/22/24 0900  insulin glargine U-100 (Lantus) pen 11 Units         -- SubQ Daily 12/22/24 0740    12/19/24 2307  insulin aspart U-100 pen 0-5 Units         -- SubQ Before meals & nightly PRN 12/19/24 2207          Hold Oral hypoglycemics while patient is in the hospital.         12/21/2024 - ZEYAD CHINOS. F/u CTH w/ stable, delineated recent infarct in R. PCA territory. Holding rivaroxaban for the time being. Transaminitis being worked up via liver U/S. Blood glucose above the 140-180 target; added basal glargine.  12/22/2024 - MATTI VSS. F/u CTH w/ stable, delineated recent infarct in R. PCA territory. Holding rivaroxaban for the time being. Transaminitis being worked up via liver U/S - s/f steatosis. Blood glucose trending higher, went up on basal by 2U.  12/23/2024 - RAFFY. Working well with PT/OT (she stood up briefly!) Exam  improved today. Resuming Xarelto tonight.     STROKE DOCUMENTATION        NIH Scale:  1a. Level of Consciousness: 0-->Alert, keenly responsive  1b. LOC Questions: 0-->Answers both questions correctly  1c. LOC Commands: 0-->Performs both tasks correctly  2. Best Gaze: 1-->Partial gaze palsy, gaze is abnormal in one or both eyes, but forced deviation or total gaze paresis is not present  3. Visual: 1-->Partial hemianopia  4. Facial Palsy: 2-->Partial paralysis (total or near-total paralysis of lower face)  5a. Motor Arm, Left: 2-->Some effort against gravity, limb cannot get to or maintain (if cued) 90 (or 45) degrees, drifts down to bed, but has some effort against gravity  5b. Motor Arm, Right: 0-->No drift, limb holds 90 (or 45) degrees for full 10 secs  6a. Motor Leg, Left: 1-->Drift, leg falls by the end of the 5-sec period but does not hit bed  6b. Motor Leg, Right: 0-->No drift, leg holds 30 degree position for full 5 secs  7. Limb Ataxia: 0-->Absent  8. Sensory: 0-->Normal, no sensory loss  9. Best Language: 0-->No aphasia, normal  10. Dysarthria: 1-->Mild-to-moderate dysarthria, patient slurs at least some words and, at worst, can be understood with some difficulty  11. Extinction and Inattention (formerly Neglect): 0-->No abnormality  Total (NIH Stroke Scale): 8     [place] Coma Scale:15     Hemorrhagic change of an Ischemic Stroke: Does this patient have an ischemic stroke with hemorrhagic changes? No     Neurologic Chief Complaint: R PCA CVA    Subjective:     Interval History: Patient is seen for follow-up neurological assessment and treatment recommendations: See hospital course    HPI, Past Medical, Family, and Social History remains the same as documented in the initial encounter.     Review of Systems   Eyes:  Positive for visual disturbance.   Neurological:  Positive for weakness.     Scheduled Meds:   atorvastatin  40 mg Oral Daily    ciprofloxacin HCl  250 mg Oral Q12H    furosemide  20 mg Oral  Every other day    gabapentin  300 mg Oral TID    insulin glargine U-100  11 Units Subcutaneous Daily    metoprolol tartrate  50 mg Oral BID    mupirocin   Nasal BID    oxybutynin  10 mg Oral Daily    pantoprazole  40 mg Oral QAM    rivaroxaban  15 mg Oral with dinner     Continuous Infusions:  PRN Meds:  Current Facility-Administered Medications:     acetaminophen, 650 mg, Oral, Q6H PRN    bisacodyL, 10 mg, Rectal, Daily PRN    dextrose 50%, 12.5 g, Intravenous, PRN    dextrose 50%, 25 g, Intravenous, PRN    glucagon (human recombinant), 1 mg, Intramuscular, PRN    glucose, 16 g, Oral, PRN    glucose, 24 g, Oral, PRN    insulin aspart U-100, 0-5 Units, Subcutaneous, QID (AC + HS) PRN    labetalol, 10 mg, Intravenous, Q15 Min PRN    sodium chloride 0.9%, 10 mL, Intravenous, PRN    Objective:     Vital Signs (Most Recent):  Temp: 97.3 °F (36.3 °C) (12/23/24 1612)  Pulse: 74 (12/23/24 1612)  Resp: 16 (12/23/24 1612)  BP: (!) 113/56 (12/23/24 1612)  SpO2: 99 % (12/23/24 1612)  BP Location: Right arm    Vital Signs Range (Last 24H):  Temp:  [97.3 °F (36.3 °C)-99 °F (37.2 °C)]   Pulse:  [58-78]   Resp:  [16-18]   BP: (109-140)/(56-64)   SpO2:  [92 %-99 %]   BP Location: Right arm       Physical Exam  Vitals and nursing note reviewed.   Constitutional:       Appearance: Normal appearance. She is normal weight.   HENT:      Head: Normocephalic and atraumatic.   Cardiovascular:      Rate and Rhythm: Normal rate and regular rhythm.   Pulmonary:      Effort: Pulmonary effort is normal. No respiratory distress.   Musculoskeletal:      Right lower leg: No edema.      Left lower leg: No edema.   Skin:     General: Skin is warm and dry.   Neurological:      Mental Status: She is alert.        Neurological Exam:   LOC: alert  Attention Span: Good   Language: No aphasia  Articulation: No dysarthria  Orientation: Person, Place, Time   Visual Fields: Hemianopsia left  EOM (CN III, IV, VI): Gaze preference  right    Laboratory:  CMP:    Recent Labs   Lab 12/23/24  0421   CALCIUM 8.6*   ALBUMIN 2.5*   PROT 5.6*   *   K 3.9   CO2 20*      BUN 29*   CREATININE 1.1   ALKPHOS 248*   ALT 92*   AST 43*   BILITOT 0.3     CBC:   Recent Labs   Lab 12/23/24  0421   WBC 8.10   RBC 3.85*   HGB 12.2   HCT 34.9*      MCV 91   MCH 31.7*   MCHC 35.0     Lipid Panel:   Recent Labs   Lab 12/20/24  0717   CHOL 106*   LDLCALC 56.2*   HDL 37*   TRIG 64     Hgb A1C:   Recent Labs   Lab 12/20/24  0717   HGBA1C 7.6*     TSH:   Recent Labs   Lab 12/21/24  0340   TSH 0.437       Diagnostic Results     Brain Imaging:  CTH with subacute right MCA infarct      MRI brain with right medial temporo-occipital territory infarct (lateral thalamus) with proximal P1 segment PCA occlusion     Cardiac Imaging     Left Ventricle: The left ventricle is normal in size. Ventricular mass is normal. Normal wall thickness. There is concentric remodeling. Normal wall motion. There is normal systolic function with a visually estimated ejection fraction of 60 - 65%. There is indeterminate diastolic function. Elevated left ventricular filling pressure.    Right Ventricle: Normal right ventricular cavity size. Wall thickness is normal. Systolic function is normal.    Left Atrium: Agitated saline study of the atrial septum is negative after vasalva maneuver, suggesting absence of intracardiac shunt at the atrial level.    Aortic Valve: There is mild annular calcification present.    Mitral Valve: There is moderate mitral annular calcification present.    Pulmonary Artery: Pulmonary artery pressure could not be estimated.    IVC/SVC: Normal venous pressure at 3 mmHg.    Maury Mcfarlane MD  Comprehensive Stroke Center  Department of Vascular Neurology   [facility])

## 2024-12-23 NOTE — SUBJECTIVE & OBJECTIVE
Neurologic Chief Complaint: R PCA CVA    Subjective:     Interval History: Patient is seen for follow-up neurological assessment and treatment recommendations: See hospital course    HPI, Past Medical, Family, and Social History remains the same as documented in the initial encounter.     Review of Systems   Eyes:  Positive for visual disturbance.   Neurological:  Positive for weakness.     Scheduled Meds:   atorvastatin  40 mg Oral Daily    ciprofloxacin HCl  250 mg Oral Q12H    furosemide  20 mg Oral Every other day    gabapentin  300 mg Oral TID    insulin glargine U-100  11 Units Subcutaneous Daily    metoprolol tartrate  50 mg Oral BID    mupirocin   Nasal BID    oxybutynin  10 mg Oral Daily    pantoprazole  40 mg Oral QAM    rivaroxaban  15 mg Oral with dinner     Continuous Infusions:  PRN Meds:  Current Facility-Administered Medications:     acetaminophen, 650 mg, Oral, Q6H PRN    bisacodyL, 10 mg, Rectal, Daily PRN    dextrose 50%, 12.5 g, Intravenous, PRN    dextrose 50%, 25 g, Intravenous, PRN    glucagon (human recombinant), 1 mg, Intramuscular, PRN    glucose, 16 g, Oral, PRN    glucose, 24 g, Oral, PRN    insulin aspart U-100, 0-5 Units, Subcutaneous, QID (AC + HS) PRN    labetalol, 10 mg, Intravenous, Q15 Min PRN    sodium chloride 0.9%, 10 mL, Intravenous, PRN    Objective:     Vital Signs (Most Recent):  Temp: 97.3 °F (36.3 °C) (12/23/24 1612)  Pulse: 74 (12/23/24 1612)  Resp: 16 (12/23/24 1612)  BP: (!) 113/56 (12/23/24 1612)  SpO2: 99 % (12/23/24 1612)  BP Location: Right arm    Vital Signs Range (Last 24H):  Temp:  [97.3 °F (36.3 °C)-99 °F (37.2 °C)]   Pulse:  [58-78]   Resp:  [16-18]   BP: (109-140)/(56-64)   SpO2:  [92 %-99 %]   BP Location: Right arm       Physical Exam  Vitals and nursing note reviewed.   Constitutional:       Appearance: Normal appearance. She is normal weight.   HENT:      Head: Normocephalic and atraumatic.   Cardiovascular:      Rate and Rhythm: Normal rate and regular  rhythm.   Pulmonary:      Effort: Pulmonary effort is normal. No respiratory distress.   Musculoskeletal:      Right lower leg: No edema.      Left lower leg: No edema.   Skin:     General: Skin is warm and dry.   Neurological:      Mental Status: She is alert.        Neurological Exam:   LOC: alert  Attention Span: Good   Language: No aphasia  Articulation: No dysarthria  Orientation: Person, Place, Time   Visual Fields: Hemianopsia left  EOM (CN III, IV, VI): Gaze preference  right    Laboratory:  CMP:   Recent Labs   Lab 12/23/24  0421   CALCIUM 8.6*   ALBUMIN 2.5*   PROT 5.6*   *   K 3.9   CO2 20*      BUN 29*   CREATININE 1.1   ALKPHOS 248*   ALT 92*   AST 43*   BILITOT 0.3     CBC:   Recent Labs   Lab 12/23/24  0421   WBC 8.10   RBC 3.85*   HGB 12.2   HCT 34.9*      MCV 91   MCH 31.7*   MCHC 35.0     Lipid Panel:   Recent Labs   Lab 12/20/24  0717   CHOL 106*   LDLCALC 56.2*   HDL 37*   TRIG 64     Hgb A1C:   Recent Labs   Lab 12/20/24  0717   HGBA1C 7.6*     TSH:   Recent Labs   Lab 12/21/24  0340   TSH 0.437       Diagnostic Results     Brain Imaging:  CTH with subacute right MCA infarct      MRI brain with right medial temporo-occipital territory infarct (lateral thalamus) with proximal P1 segment PCA occlusion     Cardiac Imaging     Left Ventricle: The left ventricle is normal in size. Ventricular mass is normal. Normal wall thickness. There is concentric remodeling. Normal wall motion. There is normal systolic function with a visually estimated ejection fraction of 60 - 65%. There is indeterminate diastolic function. Elevated left ventricular filling pressure.    Right Ventricle: Normal right ventricular cavity size. Wall thickness is normal. Systolic function is normal.    Left Atrium: Agitated saline study of the atrial septum is negative after vasalva maneuver, suggesting absence of intracardiac shunt at the atrial level.    Aortic Valve: There is mild annular calcification  present.    Mitral Valve: There is moderate mitral annular calcification present.    Pulmonary Artery: Pulmonary artery pressure could not be estimated.    IVC/SVC: Normal venous pressure at 3 mmHg.

## 2024-12-23 NOTE — ASSESSMENT & PLAN NOTE
Patients blood pressure range in the last 24 hours was: BP  Min: 101/58  Max: 162/69.The patient's inpatient anti-hypertensive regimen is listed below:  Current Antihypertensives  labetalol 20 mg/4 mL (5 mg/mL) IV syring, Every 15 min PRN, Intravenous  furosemide tablet 20 mg, Every other day, Oral  metoprolol tartrate (LOPRESSOR) tablet 50 mg, 2 times daily, Oral    Plan  - BP is controlled, no changes needed to their regimen

## 2024-12-23 NOTE — PLAN OF CARE
Ramin Doyle - Neurosurgery ([facility])  Discharge Reassessment    Primary Care Provider: Rajiv Conte PA-C    Expected Discharge Date: 12/25/2024    Patient is noted to be moderate intensity therapy, currently max assist.  CM in communication with patient daughter who is in agreement to SNF referrals being sent.  CM to send referrals to in network facilities.  At this time [facility] would be the preference as patient has been there before.    Discharge Plan A and Plan B have been determined by review of patient's clinical status, future medical and therapeutic needs, and coverage/benefits for post-acute care in coordination with multidisciplinary team members.     Reassessment (most recent)       Discharge Reassessment - 12/23/24 8286          Discharge Reassessment    Assessment Type Discharge Planning Reassessment     Did the patient's condition or plan change since previous assessment? Yes     Discharge Plan discussed with: Adult children     Communicated BRAYDON with patient/caregiver Yes     Discharge Plan A Skilled Nursing Facility     Discharge Plan B New Nursing Home placement - nursing home care facility     DME Needed Upon Discharge  other (see comments)   tbd    Transition of Care Barriers Mobility;No family/friends to help     Why the patient remains in the hospital Placement issues        Post-Acute Status    Discharge Delays Post-Acute Set-up

## 2024-12-24 PROBLEM — K59.00 CONSTIPATION: Status: ACTIVE | Noted: 2024-12-24

## 2024-12-24 LAB
ALBUMIN SERPL BCP-MCNC: 2.7 G/DL (ref 3.5–5.2)
ALP SERPL-CCNC: 270 U/L (ref 40–150)
ALT SERPL W/O P-5'-P-CCNC: 86 U/L (ref 10–44)
ANION GAP SERPL CALC-SCNC: 8 MMOL/L (ref 8–16)
AST SERPL-CCNC: 43 U/L (ref 10–40)
BASOPHILS # BLD AUTO: 0.03 K/UL (ref 0–0.2)
BASOPHILS NFR BLD: 0.4 % (ref 0–1.9)
BILIRUB SERPL-MCNC: 0.4 MG/DL (ref 0.1–1)
BUN SERPL-MCNC: 23 MG/DL (ref 8–23)
CALCIUM SERPL-MCNC: 9 MG/DL (ref 8.7–10.5)
CHLORIDE SERPL-SCNC: 103 MMOL/L (ref 95–110)
CO2 SERPL-SCNC: 24 MMOL/L (ref 23–29)
CREAT SERPL-MCNC: 0.9 MG/DL (ref 0.5–1.4)
DIFFERENTIAL METHOD BLD: ABNORMAL
EOSINOPHIL # BLD AUTO: 0.3 K/UL (ref 0–0.5)
EOSINOPHIL NFR BLD: 4.1 % (ref 0–8)
ERYTHROCYTE [DISTWIDTH] IN BLOOD BY AUTOMATED COUNT: 11.8 % (ref 11.5–14.5)
EST. GFR  (NO RACE VARIABLE): >60 ML/MIN/1.73 M^2
GLUCOSE SERPL-MCNC: 179 MG/DL (ref 70–110)
HCT VFR BLD AUTO: 37.3 % (ref 37–48.5)
HGB BLD-MCNC: 12.1 G/DL (ref 12–16)
IMM GRANULOCYTES # BLD AUTO: 0.02 K/UL (ref 0–0.04)
IMM GRANULOCYTES NFR BLD AUTO: 0.3 % (ref 0–0.5)
LYMPHOCYTES # BLD AUTO: 2.5 K/UL (ref 1–4.8)
LYMPHOCYTES NFR BLD: 32.2 % (ref 18–48)
MAGNESIUM SERPL-MCNC: 2.1 MG/DL (ref 1.6–2.6)
MCH RBC QN AUTO: 31.4 PG (ref 27–31)
MCHC RBC AUTO-ENTMCNC: 32.4 G/DL (ref 32–36)
MCV RBC AUTO: 97 FL (ref 82–98)
MONOCYTES # BLD AUTO: 0.7 K/UL (ref 0.3–1)
MONOCYTES NFR BLD: 8.3 % (ref 4–15)
NEUTROPHILS # BLD AUTO: 4.3 K/UL (ref 1.8–7.7)
NEUTROPHILS NFR BLD: 54.7 % (ref 38–73)
NRBC BLD-RTO: 0 /100 WBC
PHOSPHATE SERPL-MCNC: 3.1 MG/DL (ref 2.7–4.5)
PLATELET # BLD AUTO: 237 K/UL (ref 150–450)
PMV BLD AUTO: 10.6 FL (ref 9.2–12.9)
POCT GLUCOSE: 269 MG/DL (ref 70–110)
POCT GLUCOSE: 299 MG/DL (ref 70–110)
POTASSIUM SERPL-SCNC: 4 MMOL/L (ref 3.5–5.1)
PROT SERPL-MCNC: 6.1 G/DL (ref 6–8.4)
RBC # BLD AUTO: 3.85 M/UL (ref 4–5.4)
SODIUM SERPL-SCNC: 135 MMOL/L (ref 136–145)
WBC # BLD AUTO: 7.85 K/UL (ref 3.9–12.7)

## 2024-12-24 PROCEDURE — 83735 ASSAY OF MAGNESIUM: CPT

## 2024-12-24 PROCEDURE — 25000003 PHARM REV CODE 250

## 2024-12-24 PROCEDURE — 99233 SBSQ HOSP IP/OBS HIGH 50: CPT | Mod: ,,, | Performed by: STUDENT IN AN ORGANIZED HEALTH CARE EDUCATION/TRAINING PROGRAM

## 2024-12-24 PROCEDURE — 85025 COMPLETE CBC W/AUTO DIFF WBC: CPT

## 2024-12-24 PROCEDURE — 11000001 HC ACUTE MED/SURG PRIVATE ROOM

## 2024-12-24 PROCEDURE — 97110 THERAPEUTIC EXERCISES: CPT

## 2024-12-24 PROCEDURE — 63600175 PHARM REV CODE 636 W HCPCS

## 2024-12-24 PROCEDURE — 36415 COLL VENOUS BLD VENIPUNCTURE: CPT

## 2024-12-24 PROCEDURE — 84100 ASSAY OF PHOSPHORUS: CPT

## 2024-12-24 PROCEDURE — 80053 COMPREHEN METABOLIC PANEL: CPT

## 2024-12-24 RX ORDER — INSULIN ASPART 100 [IU]/ML
1 INJECTION, SOLUTION INTRAVENOUS; SUBCUTANEOUS
Status: DISCONTINUED | OUTPATIENT
Start: 2024-12-24 | End: 2024-12-27

## 2024-12-24 RX ORDER — POLYETHYLENE GLYCOL 3350 17 G/17G
17 POWDER, FOR SOLUTION ORAL 2 TIMES DAILY
Status: DISCONTINUED | OUTPATIENT
Start: 2024-12-24 | End: 2024-12-31 | Stop reason: HOSPADM

## 2024-12-24 RX ORDER — AMOXICILLIN 250 MG
1 CAPSULE ORAL 2 TIMES DAILY
Status: DISCONTINUED | OUTPATIENT
Start: 2024-12-24 | End: 2024-12-31 | Stop reason: HOSPADM

## 2024-12-24 RX ADMIN — POLYETHYLENE GLYCOL 3350 17 G: 17 POWDER, FOR SOLUTION ORAL at 09:12

## 2024-12-24 RX ADMIN — FUROSEMIDE 20 MG: 20 TABLET ORAL at 09:12

## 2024-12-24 RX ADMIN — MUPIROCIN: 20 OINTMENT TOPICAL at 09:12

## 2024-12-24 RX ADMIN — INSULIN GLARGINE 11 UNITS: 100 INJECTION, SOLUTION SUBCUTANEOUS at 09:12

## 2024-12-24 RX ADMIN — GABAPENTIN 300 MG: 300 CAPSULE ORAL at 09:12

## 2024-12-24 RX ADMIN — INSULIN ASPART 1 UNITS: 100 INJECTION, SOLUTION INTRAVENOUS; SUBCUTANEOUS at 04:12

## 2024-12-24 RX ADMIN — SENNOSIDES AND DOCUSATE SODIUM 1 TABLET: 50; 8.6 TABLET ORAL at 11:12

## 2024-12-24 RX ADMIN — OXYBUTYNIN CHLORIDE 10 MG: 10 TABLET, EXTENDED RELEASE ORAL at 09:12

## 2024-12-24 RX ADMIN — PANTOPRAZOLE SODIUM 40 MG: 20 TABLET, DELAYED RELEASE ORAL at 05:12

## 2024-12-24 RX ADMIN — RIVAROXABAN 15 MG: 15 TABLET, FILM COATED ORAL at 04:12

## 2024-12-24 RX ADMIN — METOPROLOL TARTRATE 50 MG: 50 TABLET, FILM COATED ORAL at 09:12

## 2024-12-24 RX ADMIN — SENNOSIDES AND DOCUSATE SODIUM 1 TABLET: 50; 8.6 TABLET ORAL at 09:12

## 2024-12-24 RX ADMIN — CIPROFLOXACIN HYDROCHLORIDE 250 MG: 250 TABLET, FILM COATED ORAL at 10:12

## 2024-12-24 RX ADMIN — POLYETHYLENE GLYCOL 3350 17 G: 17 POWDER, FOR SOLUTION ORAL at 11:12

## 2024-12-24 RX ADMIN — ATORVASTATIN CALCIUM 40 MG: 40 TABLET, FILM COATED ORAL at 09:12

## 2024-12-24 RX ADMIN — CIPROFLOXACIN HYDROCHLORIDE 250 MG: 250 TABLET, FILM COATED ORAL at 09:12

## 2024-12-24 RX ADMIN — INSULIN ASPART 1 UNITS: 100 INJECTION, SOLUTION INTRAVENOUS; SUBCUTANEOUS at 09:12

## 2024-12-24 RX ADMIN — GABAPENTIN 300 MG: 300 CAPSULE ORAL at 04:12

## 2024-12-24 NOTE — PT/OT/SLP PROGRESS
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 female with a medical diagnosis of Stroke due to occlusion of right posterior cerebral artery.  She presents with decreased self-care and functional mobility 2/2 AMS. Performance deficits affecting function are weakness, impaired functional mobility, impaired cognition, decreased safety awareness, impaired coordination, impaired endurance, gait instability, decreased coordination, impaired fine motor, impaired balance, abnormal tone, decreased upper extremity function, impaired self care skills, visual deficits, decreased lower extremity function, decreased ROM. This date pt benefiting from consistent assistance for self-care and functional mobility activities. Pt continues to display LUE shoulder flexion AROM WFL though with significantly decreased coordination. Pt also continues to display decreased AROM of shoulder and digits for LUE. Pt able to bring eyes to midline, though continues to display difficulty crossing midline to L. Patient continues to demonstrate the need for moderate intensity therapy on a daily basis post acute exhibited by decreased independence with self-care and functional mobility      Rehab Prognosis:  Good; patient would benefit from acute skilled OT services to address these deficits and reach maximum level of function.       Plan:     Patient to be seen 4 x/week to address the above listed problems via self-care/home management, therapeutic activities, therapeutic exercises, neuromuscular re-education  Plan of Care Expires: 01/20/25  Plan of Care Reviewed  "with: patient    Subjective     Chief Complaint: '"no one has helped me eat" (breakfast)  Patient/Family Comments/goals: increase independence  Pain/Comfort:  Pain Rating 1: 0/10    Objective:     Communicated with: nursing prior to session.  Patient found HOB elevated with bed alarm, SCD, telemetry, PureWick upon OT entry to room.    General Precautions: Standard, aspiration, fall    Orthopedic Precautions:N/A  Braces: N/A  Respiratory Status: Room air     Occupational Performance:     Bed Mobility:    Patient completed Scooting/Bridging with maximal assistance  Patient completed Supine to Sit with maximal assistance and 2 persons  Patient completed Sit to Supine with maximal assistance and 2 persons   Pt seated EOB with maximal assistance 2/2 posterior lean with maximal verbal and tactile cues to increase core mm activation    Functional Mobility/Transfers:  Patient completed Sit <> Stand Transfer with maximal assistance and of 2 persons  with  no assistive device x2 reps with 50% hip clearance and maximal verbal and tactile cues    Activities of Daily Living:  Grooming: minimum assistance for hair brushing with RUE while seated EOB    Therapeutic exercise:  Therapist assisted PROM of LUE in all planes with joint protection strategies x10 ea to prevent contracture    AMPAC 6 Click ADL: 12    Treatment & Education:  Session this date targeting self-care, therapeutic activities, and therapeutic exercise to increase pt's independence  Pt educated on OT roles, POC, call button for assistance  Pt educated on LUE positioning to prevent contracture with pt left with pillow under LUE to maximize positioning to prevent adverse events    Patient left HOB elevated with all lines intact, call button in reach, bed alarm on, and nursing notified    GOALS:   Multidisciplinary Problems       Occupational Therapy Goals          Problem: Occupational Therapy    Goal Priority Disciplines Outcome Interventions   Occupational Therapy " Goal     OT, PT/OT Progressing    Description: Goals to be met by: 1/20/24     Patient will increase functional independence with ADLs by performing:    UE Dressing with Moderate Assistance.  LE Dressing with Moderate Assistance.  Grooming while EOB with Minimal Assistance.  Toileting from bedside commode with Maximum Assistance for hygiene and clothing management.   Sitting at edge of bed x10 minutes with Supervision.  Supine to sit with Supervision.  Stand pivot transfers with Maximum Assistance.  Step transfer with Maximum Assistance  Toilet transfer to bedside commode with Maximum Assistance.                         Time Tracking:     OT Date of Treatment: 12/24/24  OT Start Time: 0844  OT Stop Time: 0903  OT Total Time (min): 19 min    Billable Minutes:Therapeutic Exercise 19    OT/ISAAC: OT          12/24/2024

## 2024-12-24 NOTE — PLAN OF CARE
Cm sent epic referral to in network facilities for Skilled at daughters request.    Discharge Plan A and Plan B have been determined by review of patient's clinical status, future medical and therapeutic needs, and coverage/benefits for post-acute care in coordination with multidisciplinary team members.     Met with daughter to review discharge recommendation of Skilled and is agreeable to plan    Patient/family provided list of facilities in-network with patient's payor plan. Providers that are owned, operated, or affiliated with [facility] are included on the list.     Notified that referral sent to below listed facilities from in-network list based on proximity to home/family support:   1.[facility]  2.[facility]  3.[facility]  4.[facility]  5. [facility]  Patient/family instructed to identify preference.    Preferred Facility: (if more than 1, listed in order of descending preference)  1.[facility]    If an additional preferred facility not listed above is identified, additional referral to be sent. If above facilities unable to accept, will send additional referrals to in-network providers.

## 2024-12-24 NOTE — PLAN OF CARE
Problem: Adult Inpatient Plan of Care  Goal: Patient-Specific Goal (Individualized)  Description: Pt will maintain sbp below 220.  Outcome: Progressing  Flowsheets (Taken 12/24/2024 0321)  Individualized Care Needs: care clustered  Anxieties, Fears or Concerns: none     Problem: Wound  Goal: Optimal Coping  Outcome: Progressing  Intervention: Support Patient and Family Response  Flowsheets (Taken 12/24/2024 0321)  Supportive Measures: active listening utilized  Family/Support System Care: self-care encouraged     Problem: Stroke, Ischemic (Includes Transient Ischemic Attack)  Goal: Optimal Coping  Outcome: Progressing  Intervention: Support Psychosocial Response to Stroke  Flowsheets (Taken 12/24/2024 0321)  Supportive Measures: active listening utilized  Family/Support System Care: self-care encouraged     VSS. Bed in lowest position, side rails x 3, and call light in use.

## 2024-12-24 NOTE — ASSESSMENT & PLAN NOTE
Patient with paroxysmal (<7 days) atrial fibrillation which is controlled currently with Beta Blocker. Patient is currently in sinus rhythm.[id number] Score: 5. Patient is anticoagulated with Xarelto 15 mg po daily with dinner

## 2024-12-24 NOTE — ASSESSMENT & PLAN NOTE
Patient's FSGs are controlled on current medication regimen.  Last A1c reviewed-   Lab Results   Component Value Date    HGBA1C 7.6 (H) 12/20/2024     Most recent fingerstick glucose reviewed-   Recent Labs   Lab 12/23/24  1614 12/23/24  2146 12/24/24  1154   POCTGLUCOSE 249* 212* 269*     Current correctional scale  Low  Increase anti-hyperglycemic dose as follows-   Antihyperglycemics (From admission, onward)      Start     Stop Route Frequency Ordered    12/24/24 1645  insulin aspart U-100 pen 1 Units         -- SubQ 3 times daily with meals 12/24/24 1424    12/22/24 0900  insulin glargine U-100 (Lantus) pen 11 Units         -- SubQ Daily 12/22/24 0740    12/19/24 2307  insulin aspart U-100 pen 0-5 Units         -- SubQ Before meals & nightly PRN 12/19/24 2207          Hold Oral hypoglycemics while patient is in the hospital.

## 2024-12-24 NOTE — PLAN OF CARE
12/24/24 1121   Post-Acute Status   Post-Acute Authorization Placement   Post-Acute Placement Status Referrals Sent

## 2024-12-24 NOTE — PROGRESS NOTES
Ramin Doyle - Neurosurgery ([facility])  Vascular Neurology  Comprehensive Stroke Center  Progress Note    Assessment/Plan:     * Stroke due to occlusion of right posterior cerebral artery  Ms. Willa Kim is a 89 y.o. patient transferred from OSH for right PCA (P1) occlusion monitoring. History of afib on Xarelto, CHF (EF 60%), HTN, DM. Presented with LSW. No baseline NIHSS documented from telestroke. CTH with subacute right MCA infarct; no follow up CTA done. MRI brain with right medial temporo-occipital territory infarct (lateral thalamus) with proximal P1 segment PCA occlusion. OOW for TNK, not IR candidate. NIHSS 9 with LSW and not fully crossing midline. Daughter noticed some dysarthria this AM. Suspected embolic/cardio-embolic etiology. Given some evidence of cerebral atrophy, risk of malignant cerebral edema is low but patient should be closely monitored.    Antithrombotics for secondary stroke prevention:  Xarelto 15 mg po daily with dinner    Statins for secondary stroke prevention and hyperlipidemia, if present:   Statins: Atorvastatin- 40 mg daily    Aggressive risk factor modification: HTN, A-Fib     Rehab efforts: The patient has been evaluated by a stroke team provider and the therapy needs have been fully considered based off the presenting complaints and exam findings. The following therapy evaluations are needed: PT evaluate and treat, OT evaluate and treat, SLP evaluate and treat, PM&R evaluate for appropriate placement, when able     Diagnostics ordered/pending: None     VTE prophylaxis: Mechanical prophylaxis: Place SCDs; Xarelto as above    BP parameters: Infarct: No intervention, SBP <220    Constipation  Miralax and senna-doc BID    CHF (congestive heart failure)  Resumed home Lasix 20    A-fib  Patient with paroxysmal (<7 days) atrial fibrillation which is controlled currently with Beta Blocker. Patient is currently in sinus rhythm.[id number] Score: 5. Patient is anticoagulated with  Xarelto 15 mg po daily with dinner      GERD (gastroesophageal reflux disease)  Resumed home protonix     Essential hypertension  Patients blood pressure range in the last 24 hours was: BP  Min: 101/58  Max: 162/69.The patient's inpatient anti-hypertensive regimen is listed below:  Current Antihypertensives  labetalol 20 mg/4 mL (5 mg/mL) IV syring, Every 15 min PRN, Intravenous  furosemide tablet 20 mg, Every other day, Oral  metoprolol tartrate (LOPRESSOR) tablet 50 mg, 2 times daily, Oral    Plan  - BP is controlled, no changes needed to their regimen    Urge incontinence  Resumed home oxybutinin   On purewick     Diabetes mellitus, type II  Patient's FSGs are controlled on current medication regimen.  Last A1c reviewed-   Lab Results   Component Value Date    HGBA1C 7.6 (H) 12/20/2024     Most recent fingerstick glucose reviewed-   Recent Labs   Lab 12/23/24  1614 12/23/24  2146 12/24/24  1154   POCTGLUCOSE 249* 212* 269*     Current correctional scale  Low  Increase anti-hyperglycemic dose as follows-   Antihyperglycemics (From admission, onward)      Start     Stop Route Frequency Ordered    12/24/24 1645  insulin aspart U-100 pen 1 Units         -- SubQ 3 times daily with meals 12/24/24 1424    12/22/24 0900  insulin glargine U-100 (Lantus) pen 11 Units         -- SubQ Daily 12/22/24 0740    12/19/24 2307  insulin aspart U-100 pen 0-5 Units         -- SubQ Before meals & nightly PRN 12/19/24 2207          Hold Oral hypoglycemics while patient is in the hospital.         12/21/2024 - MATTI VSS. F/u CTH w/ stable, delineated recent infarct in R. PCA territory. Holding rivaroxaban for the time being. Transaminitis being worked up via liver U/S. Blood glucose above the 140-180 target; added basal glargine.  12/22/2024 - MATTI VSS. F/u CTH w/ stable, delineated recent infarct in R. PCA territory. Holding rivaroxaban for the time being. Transaminitis being worked up via liver U/S - s/f steatosis. Blood glucose  trending higher, went up on basal by 2U.  12/23/2024 - NAEON. Working well with PT/OT (she stood up briefly!) Exam improved today. Resuming Xarelto tonight.   12/24/2024 - NAEON. Medically ready, pending SNF. Added bowel regimen.     STROKE DOCUMENTATION        NIH Scale:  1a. Level of Consciousness: 0-->Alert, keenly responsive  1b. LOC Questions: 0-->Answers both questions correctly  1c. LOC Commands: 0-->Performs both tasks correctly  2. Best Gaze: 1-->Partial gaze palsy, gaze is abnormal in one or both eyes, but forced deviation or total gaze paresis is not present  3. Visual: 1-->Partial hemianopia  4. Facial Palsy: 2-->Partial paralysis (total or near-total paralysis of lower face)  5a. Motor Arm, Left: 2-->Some effort against gravity, limb cannot get to or maintain (if cued) 90 (or 45) degrees, drifts down to bed, but has some effort against gravity  5b. Motor Arm, Right: 0-->No drift, limb holds 90 (or 45) degrees for full 10 secs  6a. Motor Leg, Left: 1-->Drift, leg falls by the end of the 5-sec period but does not hit bed  6b. Motor Leg, Right: 0-->No drift, leg holds 30 degree position for full 5 secs  7. Limb Ataxia: 0-->Absent  8. Sensory: 0-->Normal, no sensory loss  9. Best Language: 0-->No aphasia, normal  10. Dysarthria: 1-->Mild-to-moderate dysarthria, patient slurs at least some words and, at worst, can be understood with some difficulty  11. Extinction and Inattention (formerly Neglect): 0-->No abnormality  Total (NIH Stroke Scale): 8       Modified Dunlap    Asya Coma Scale:    ABCD2 Score:    [id number] Score:   HAS -BLED Score:   ICH Score:   Hunt & Wilson Classification:      Hemorrhagic change of an Ischemic Stroke: Does this patient have an ischemic stroke with hemorrhagic changes? No     Neurologic Chief Complaint: R PCA CVA    Subjective:     Interval History: Patient is seen for follow-up neurological assessment and treatment recommendations: See hospital course    HPI, Past Medical,  Family, and Social History remains the same as documented in the initial encounter.     Review of Systems   Eyes:  Positive for visual disturbance.   Neurological:  Positive for weakness.     Scheduled Meds:   atorvastatin  40 mg Oral Daily    ciprofloxacin HCl  250 mg Oral Q12H    furosemide  20 mg Oral Every other day    gabapentin  300 mg Oral TID    insulin glargine U-100  11 Units Subcutaneous Daily    metoprolol tartrate  50 mg Oral BID    oxybutynin  10 mg Oral Daily    pantoprazole  40 mg Oral QAM    polyethylene glycol  17 g Oral BID    rivaroxaban  15 mg Oral with dinner    senna-docusate 8.6-50 mg  1 tablet Oral BID     Continuous Infusions:  PRN Meds:  Current Facility-Administered Medications:     acetaminophen, 650 mg, Oral, Q6H PRN    bisacodyL, 10 mg, Rectal, Daily PRN    dextrose 50%, 12.5 g, Intravenous, PRN    dextrose 50%, 25 g, Intravenous, PRN    glucagon (human recombinant), 1 mg, Intramuscular, PRN    glucose, 16 g, Oral, PRN    glucose, 24 g, Oral, PRN    insulin aspart U-100, 0-5 Units, Subcutaneous, QID (AC + HS) PRN    labetalol, 10 mg, Intravenous, Q15 Min PRN    sodium chloride 0.9%, 10 mL, Intravenous, PRN    Objective:     Vital Signs (Most Recent):  Temp: 98.3 °F (36.8 °C) (12/24/24 0724)  Pulse: 65 (12/24/24 1124)  Resp: 18 (12/24/24 0724)  BP: (!) 149/69 (12/24/24 0724)  SpO2: 95 % (12/24/24 0724)  BP Location: Right arm    Vital Signs Range (Last 24H):  Temp:  [97.3 °F (36.3 °C)-98.3 °F (36.8 °C)]   Pulse:  [64-76]   Resp:  [16-18]   BP: (113-149)/(56-69)   SpO2:  [92 %-99 %]   BP Location: Right arm       Physical Exam  Vitals and nursing note reviewed.   Constitutional:       Appearance: Normal appearance. She is normal weight.   HENT:      Head: Normocephalic and atraumatic.   Cardiovascular:      Rate and Rhythm: Normal rate and regular rhythm.   Pulmonary:      Effort: Pulmonary effort is normal. No respiratory distress.   Musculoskeletal:      Right lower leg: No edema.       Left lower leg: No edema.   Skin:     General: Skin is warm and dry.   Neurological:      Mental Status: She is alert.        Neurological Exam:   LOC: alert  Attention Span: Good   Language: No aphasia  Articulation: No dysarthria  Orientation: Person, Place, Time   Visual Fields: Hemianopsia left  EOM (CN III, IV, VI): Gaze preference  right    Laboratory:  CMP:   Recent Labs   Lab 12/24/24  0248   CALCIUM 9.0   ALBUMIN 2.7*   PROT 6.1   *   K 4.0   CO2 24      BUN 23   CREATININE 0.9   ALKPHOS 270*   ALT 86*   AST 43*   BILITOT 0.4     CBC:   Recent Labs   Lab 12/24/24  0248   WBC 7.85   RBC 3.85*   HGB 12.1   HCT 37.3      MCV 97   MCH 31.4*   MCHC 32.4     Lipid Panel:   Recent Labs   Lab 12/20/24  0717   CHOL 106*   LDLCALC 56.2*   HDL 37*   TRIG 64     Hgb A1C:   Recent Labs   Lab 12/20/24  0717   HGBA1C 7.6*     TSH:   Recent Labs   Lab 12/21/24  0340   TSH 0.437       Diagnostic Results     Brain Imaging:  CTH with subacute right MCA infarct      MRI brain with right medial temporo-occipital territory infarct (lateral thalamus) with proximal P1 segment PCA occlusion     Cardiac Imaging     Left Ventricle: The left ventricle is normal in size. Ventricular mass is normal. Normal wall thickness. There is concentric remodeling. Normal wall motion. There is normal systolic function with a visually estimated ejection fraction of 60 - 65%. There is indeterminate diastolic function. Elevated left ventricular filling pressure.    Right Ventricle: Normal right ventricular cavity size. Wall thickness is normal. Systolic function is normal.    Left Atrium: Agitated saline study of the atrial septum is negative after vasalva maneuver, suggesting absence of intracardiac shunt at the atrial level.    Aortic Valve: There is mild annular calcification present.    Mitral Valve: There is moderate mitral annular calcification present.    Pulmonary Artery: Pulmonary artery pressure could not be estimated.     IVC/SVC: Normal venous pressure at 3 mmHg.    Maury Mcfarlane MD  Comprehensive Stroke Center  Department of Vascular Neurology   [facility])

## 2024-12-24 NOTE — SUBJECTIVE & OBJECTIVE
Neurologic Chief Complaint: R PCA CVA    Subjective:     Interval History: Patient is seen for follow-up neurological assessment and treatment recommendations: See hospital course    HPI, Past Medical, Family, and Social History remains the same as documented in the initial encounter.     Review of Systems   Eyes:  Positive for visual disturbance.   Neurological:  Positive for weakness.     Scheduled Meds:   atorvastatin  40 mg Oral Daily    ciprofloxacin HCl  250 mg Oral Q12H    furosemide  20 mg Oral Every other day    gabapentin  300 mg Oral TID    insulin glargine U-100  11 Units Subcutaneous Daily    metoprolol tartrate  50 mg Oral BID    oxybutynin  10 mg Oral Daily    pantoprazole  40 mg Oral QAM    polyethylene glycol  17 g Oral BID    rivaroxaban  15 mg Oral with dinner    senna-docusate 8.6-50 mg  1 tablet Oral BID     Continuous Infusions:  PRN Meds:  Current Facility-Administered Medications:     acetaminophen, 650 mg, Oral, Q6H PRN    bisacodyL, 10 mg, Rectal, Daily PRN    dextrose 50%, 12.5 g, Intravenous, PRN    dextrose 50%, 25 g, Intravenous, PRN    glucagon (human recombinant), 1 mg, Intramuscular, PRN    glucose, 16 g, Oral, PRN    glucose, 24 g, Oral, PRN    insulin aspart U-100, 0-5 Units, Subcutaneous, QID (AC + HS) PRN    labetalol, 10 mg, Intravenous, Q15 Min PRN    sodium chloride 0.9%, 10 mL, Intravenous, PRN    Objective:     Vital Signs (Most Recent):  Temp: 98.3 °F (36.8 °C) (12/24/24 0724)  Pulse: 65 (12/24/24 1124)  Resp: 18 (12/24/24 0724)  BP: (!) 149/69 (12/24/24 0724)  SpO2: 95 % (12/24/24 0724)  BP Location: Right arm    Vital Signs Range (Last 24H):  Temp:  [97.3 °F (36.3 °C)-98.3 °F (36.8 °C)]   Pulse:  [64-76]   Resp:  [16-18]   BP: (113-149)/(56-69)   SpO2:  [92 %-99 %]   BP Location: Right arm       Physical Exam  Vitals and nursing note reviewed.   Constitutional:       Appearance: Normal appearance. She is normal weight.   HENT:      Head: Normocephalic and atraumatic.    Cardiovascular:      Rate and Rhythm: Normal rate and regular rhythm.   Pulmonary:      Effort: Pulmonary effort is normal. No respiratory distress.   Musculoskeletal:      Right lower leg: No edema.      Left lower leg: No edema.   Skin:     General: Skin is warm and dry.   Neurological:      Mental Status: She is alert.        Neurological Exam:   LOC: alert  Attention Span: Good   Language: No aphasia  Articulation: No dysarthria  Orientation: Person, Place, Time   Visual Fields: Hemianopsia left  EOM (CN III, IV, VI): Gaze preference  right    Laboratory:  CMP:   Recent Labs   Lab 12/24/24  0248   CALCIUM 9.0   ALBUMIN 2.7*   PROT 6.1   *   K 4.0   CO2 24      BUN 23   CREATININE 0.9   ALKPHOS 270*   ALT 86*   AST 43*   BILITOT 0.4     CBC:   Recent Labs   Lab 12/24/24  0248   WBC 7.85   RBC 3.85*   HGB 12.1   HCT 37.3      MCV 97   MCH 31.4*   MCHC 32.4     Lipid Panel:   Recent Labs   Lab 12/20/24  0717   CHOL 106*   LDLCALC 56.2*   HDL 37*   TRIG 64     Hgb A1C:   Recent Labs   Lab 12/20/24  0717   HGBA1C 7.6*     TSH:   Recent Labs   Lab 12/21/24  0340   TSH 0.437       Diagnostic Results     Brain Imaging:  CTH with subacute right MCA infarct      MRI brain with right medial temporo-occipital territory infarct (lateral thalamus) with proximal P1 segment PCA occlusion     Cardiac Imaging     Left Ventricle: The left ventricle is normal in size. Ventricular mass is normal. Normal wall thickness. There is concentric remodeling. Normal wall motion. There is normal systolic function with a visually estimated ejection fraction of 60 - 65%. There is indeterminate diastolic function. Elevated left ventricular filling pressure.    Right Ventricle: Normal right ventricular cavity size. Wall thickness is normal. Systolic function is normal.    Left Atrium: Agitated saline study of the atrial septum is negative after vasalva maneuver, suggesting absence of intracardiac shunt at the atrial level.     Aortic Valve: There is mild annular calcification present.    Mitral Valve: There is moderate mitral annular calcification present.    Pulmonary Artery: Pulmonary artery pressure could not be estimated.    IVC/SVC: Normal venous pressure at 3 mmHg.

## 2024-12-25 PROBLEM — N17.9 AKI (ACUTE KIDNEY INJURY): Status: ACTIVE | Noted: 2024-12-25

## 2024-12-25 LAB
ALBUMIN SERPL BCP-MCNC: 2.7 G/DL (ref 3.5–5.2)
ALP SERPL-CCNC: 318 U/L (ref 40–150)
ALT SERPL W/O P-5'-P-CCNC: 109 U/L (ref 10–44)
ANION GAP SERPL CALC-SCNC: 10 MMOL/L (ref 8–16)
ANION GAP SERPL CALC-SCNC: 10 MMOL/L (ref 8–16)
AST SERPL-CCNC: 94 U/L (ref 10–40)
BASOPHILS # BLD AUTO: 0.02 K/UL (ref 0–0.2)
BASOPHILS NFR BLD: 0.2 % (ref 0–1.9)
BILIRUB DIRECT SERPL-MCNC: 0.2 MG/DL (ref 0.1–0.3)
BILIRUB SERPL-MCNC: 0.4 MG/DL (ref 0.1–1)
BUN SERPL-MCNC: 31 MG/DL (ref 8–23)
BUN SERPL-MCNC: 31 MG/DL (ref 8–23)
CALCIUM SERPL-MCNC: 9.1 MG/DL (ref 8.7–10.5)
CALCIUM SERPL-MCNC: 9.2 MG/DL (ref 8.7–10.5)
CHLORIDE SERPL-SCNC: 105 MMOL/L (ref 95–110)
CHLORIDE SERPL-SCNC: 106 MMOL/L (ref 95–110)
CO2 SERPL-SCNC: 21 MMOL/L (ref 23–29)
CO2 SERPL-SCNC: 22 MMOL/L (ref 23–29)
CREAT SERPL-MCNC: 1.1 MG/DL (ref 0.5–1.4)
CREAT SERPL-MCNC: 1.3 MG/DL (ref 0.5–1.4)
DIFFERENTIAL METHOD BLD: ABNORMAL
EOSINOPHIL # BLD AUTO: 0 K/UL (ref 0–0.5)
EOSINOPHIL NFR BLD: 0 % (ref 0–8)
ERYTHROCYTE [DISTWIDTH] IN BLOOD BY AUTOMATED COUNT: 11.8 % (ref 11.5–14.5)
EST. GFR  (NO RACE VARIABLE): 39.8 ML/MIN/1.73 M^2
EST. GFR  (NO RACE VARIABLE): 48.6 ML/MIN/1.73 M^2
GGT SERPL-CCNC: 220 U/L (ref 8–55)
GLUCOSE SERPL-MCNC: 190 MG/DL (ref 70–110)
GLUCOSE SERPL-MCNC: 235 MG/DL (ref 70–110)
HCT VFR BLD AUTO: 39.2 % (ref 37–48.5)
HGB BLD-MCNC: 12.9 G/DL (ref 12–16)
IMM GRANULOCYTES # BLD AUTO: 0.04 K/UL (ref 0–0.04)
IMM GRANULOCYTES NFR BLD AUTO: 0.4 % (ref 0–0.5)
LYMPHOCYTES # BLD AUTO: 1.4 K/UL (ref 1–4.8)
LYMPHOCYTES NFR BLD: 12.5 % (ref 18–48)
MAGNESIUM SERPL-MCNC: 2.1 MG/DL (ref 1.6–2.6)
MCH RBC QN AUTO: 31.5 PG (ref 27–31)
MCHC RBC AUTO-ENTMCNC: 32.9 G/DL (ref 32–36)
MCV RBC AUTO: 96 FL (ref 82–98)
MONOCYTES # BLD AUTO: 0.7 K/UL (ref 0.3–1)
MONOCYTES NFR BLD: 5.9 % (ref 4–15)
NEUTROPHILS # BLD AUTO: 8.9 K/UL (ref 1.8–7.7)
NEUTROPHILS NFR BLD: 81 % (ref 38–73)
NRBC BLD-RTO: 0 /100 WBC
PHOSPHATE SERPL-MCNC: 4.5 MG/DL (ref 2.7–4.5)
PLATELET # BLD AUTO: 281 K/UL (ref 150–450)
PMV BLD AUTO: 10.7 FL (ref 9.2–12.9)
POCT GLUCOSE: 198 MG/DL (ref 70–110)
POCT GLUCOSE: 223 MG/DL (ref 70–110)
POCT GLUCOSE: 227 MG/DL (ref 70–110)
POCT GLUCOSE: 232 MG/DL (ref 70–110)
POTASSIUM SERPL-SCNC: 4.4 MMOL/L (ref 3.5–5.1)
POTASSIUM SERPL-SCNC: 4.5 MMOL/L (ref 3.5–5.1)
PROT SERPL-MCNC: 6.1 G/DL (ref 6–8.4)
RBC # BLD AUTO: 4.1 M/UL (ref 4–5.4)
SODIUM SERPL-SCNC: 136 MMOL/L (ref 136–145)
SODIUM SERPL-SCNC: 138 MMOL/L (ref 136–145)
WBC # BLD AUTO: 11.01 K/UL (ref 3.9–12.7)

## 2024-12-25 PROCEDURE — 25000003 PHARM REV CODE 250

## 2024-12-25 PROCEDURE — 36415 COLL VENOUS BLD VENIPUNCTURE: CPT

## 2024-12-25 PROCEDURE — 85025 COMPLETE CBC W/AUTO DIFF WBC: CPT

## 2024-12-25 PROCEDURE — 80048 BASIC METABOLIC PNL TOTAL CA: CPT | Mod: XB

## 2024-12-25 PROCEDURE — 84100 ASSAY OF PHOSPHORUS: CPT

## 2024-12-25 PROCEDURE — 11000001 HC ACUTE MED/SURG PRIVATE ROOM

## 2024-12-25 PROCEDURE — 83735 ASSAY OF MAGNESIUM: CPT

## 2024-12-25 PROCEDURE — 80053 COMPREHEN METABOLIC PANEL: CPT

## 2024-12-25 PROCEDURE — 82977 ASSAY OF GGT: CPT

## 2024-12-25 PROCEDURE — 99233 SBSQ HOSP IP/OBS HIGH 50: CPT | Mod: ,,, | Performed by: STUDENT IN AN ORGANIZED HEALTH CARE EDUCATION/TRAINING PROGRAM

## 2024-12-25 PROCEDURE — 82248 BILIRUBIN DIRECT: CPT

## 2024-12-25 RX ORDER — SODIUM CHLORIDE 9 MG/ML
INJECTION, SOLUTION INTRAVENOUS CONTINUOUS
Status: ACTIVE | OUTPATIENT
Start: 2024-12-25 | End: 2024-12-25

## 2024-12-25 RX ADMIN — INSULIN GLARGINE 11 UNITS: 100 INJECTION, SOLUTION SUBCUTANEOUS at 09:12

## 2024-12-25 RX ADMIN — POLYETHYLENE GLYCOL 3350 17 G: 17 POWDER, FOR SOLUTION ORAL at 09:12

## 2024-12-25 RX ADMIN — POLYETHYLENE GLYCOL 3350 17 G: 17 POWDER, FOR SOLUTION ORAL at 08:12

## 2024-12-25 RX ADMIN — PANTOPRAZOLE SODIUM 40 MG: 20 TABLET, DELAYED RELEASE ORAL at 06:12

## 2024-12-25 RX ADMIN — SODIUM CHLORIDE 500 ML: 0.9 INJECTION, SOLUTION INTRAVENOUS at 08:12

## 2024-12-25 RX ADMIN — GABAPENTIN 300 MG: 300 CAPSULE ORAL at 04:12

## 2024-12-25 RX ADMIN — SODIUM CHLORIDE: 0.9 INJECTION, SOLUTION INTRAVENOUS at 08:12

## 2024-12-25 RX ADMIN — CIPROFLOXACIN HYDROCHLORIDE 250 MG: 250 TABLET, FILM COATED ORAL at 11:12

## 2024-12-25 RX ADMIN — CIPROFLOXACIN HYDROCHLORIDE 250 MG: 250 TABLET, FILM COATED ORAL at 09:12

## 2024-12-25 RX ADMIN — RIVAROXABAN 15 MG: 15 TABLET, FILM COATED ORAL at 04:12

## 2024-12-25 RX ADMIN — GABAPENTIN 300 MG: 300 CAPSULE ORAL at 09:12

## 2024-12-25 RX ADMIN — GABAPENTIN 300 MG: 300 CAPSULE ORAL at 08:12

## 2024-12-25 RX ADMIN — SENNOSIDES AND DOCUSATE SODIUM 1 TABLET: 50; 8.6 TABLET ORAL at 08:12

## 2024-12-25 RX ADMIN — OXYBUTYNIN CHLORIDE 10 MG: 10 TABLET, EXTENDED RELEASE ORAL at 09:12

## 2024-12-25 RX ADMIN — METOPROLOL TARTRATE 50 MG: 50 TABLET, FILM COATED ORAL at 08:12

## 2024-12-25 RX ADMIN — INSULIN ASPART 1 UNITS: 100 INJECTION, SOLUTION INTRAVENOUS; SUBCUTANEOUS at 04:12

## 2024-12-25 RX ADMIN — METOPROLOL TARTRATE 50 MG: 50 TABLET, FILM COATED ORAL at 09:12

## 2024-12-25 RX ADMIN — SENNOSIDES AND DOCUSATE SODIUM 1 TABLET: 50; 8.6 TABLET ORAL at 09:12

## 2024-12-25 RX ADMIN — ATORVASTATIN CALCIUM 40 MG: 40 TABLET, FILM COATED ORAL at 09:12

## 2024-12-25 RX ADMIN — INSULIN ASPART 1 UNITS: 100 INJECTION, SOLUTION INTRAVENOUS; SUBCUTANEOUS at 11:12

## 2024-12-25 RX ADMIN — INSULIN ASPART 1 UNITS: 100 INJECTION, SOLUTION INTRAVENOUS; SUBCUTANEOUS at 07:12

## 2024-12-25 NOTE — PLAN OF CARE
Problem: Adult Inpatient Plan of Care  Goal: Plan of Care Review  Outcome: Progressing  Goal: Patient-Specific Goal (Individualized)  Description: Pt will maintain sbp below 220.  Outcome: Progressing  Goal: Optimal Comfort and Wellbeing  Outcome: Progressing  Goal: Readiness for Transition of Care  Outcome: Progressing     Problem: Diabetes Comorbidity  Goal: Blood Glucose Level Within Targeted Range  Outcome: Progressing     Problem: Stroke, Ischemic (Includes Transient Ischemic Attack)  Goal: Effective Bowel Elimination  Outcome: Progressing  Goal: Optimal Cerebral Tissue Perfusion  Outcome: Progressing  Goal: Optimal Cognitive Function  Outcome: Progressing  Goal: Improved Communication Skills  Outcome: Progressing  Goal: Optimal Functional Ability  Outcome: Progressing  Goal: Safe and Effective Swallow  Outcome: Progressing  Goal: Effective Urinary Elimination  Outcome: Progressing   POC and stroke booklet reviewed with the kvngn and her  at the [facility]. Verbalization of understanding voiced. Questions and concenrs addressed. Stroke booklet remains at the bedside. Patient alert and verbally responsive to staffs. Incontinent care provided. All safety measures maintained. POC ongoing.

## 2024-12-25 NOTE — ASSESSMENT & PLAN NOTE
ARNULFO is likely due to pre-renal azotemia due to dehydration. Baseline creatinine is  ~1.0 . Most recent creatinine and eGFR are listed below.  Recent Labs     12/23/24  0421 12/24/24  0248 12/25/24  0325   CREATININE 1.1 0.9 1.3   EGFRNORACEVR 48.6* >60.0 39.8*      Plan  - ARNULFO is worsening. Will adjust treatment as follows:  cc bolus x 1 then 75 cc/hr x 13 hours; hold Lasix  - Avoid nephrotoxins and renally dose meds for GFR listed above  - Monitor urine output, serial BMP, and adjust therapy as needed

## 2024-12-25 NOTE — PROGRESS NOTES
Ramin Doyle - Neurosurgery ([facility])  Vascular Neurology  Comprehensive Stroke Center  Progress Note    Assessment/Plan:     * Stroke due to occlusion of right posterior cerebral artery  Ms. Willa Kim is a 89 y.o. patient transferred from OSH for right PCA (P1) occlusion monitoring. History of afib on Xarelto, CHF (EF 60%), HTN, DM. Presented with LSW. No baseline NIHSS documented from telestroke. CTH with subacute right MCA infarct; no follow up CTA done. MRI brain with right medial temporo-occipital territory infarct (lateral thalamus) with proximal P1 segment PCA occlusion. OOW for TNK, not IR candidate. NIHSS 9 with LSW and not fully crossing midline. Daughter noticed some dysarthria this AM. Suspected embolic/cardio-embolic etiology. Given some evidence of cerebral atrophy, risk of malignant cerebral edema is low but patient should be closely monitored.    Antithrombotics for secondary stroke prevention:  Xarelto 15 mg po daily with dinner    Statins for secondary stroke prevention and hyperlipidemia, if present:   Statins: Atorvastatin- 40 mg daily    Aggressive risk factor modification: HTN, A-Fib     Rehab efforts: The patient has been evaluated by a stroke team provider and the therapy needs have been fully considered based off the presenting complaints and exam findings. The following therapy evaluations are needed: PT evaluate and treat, OT evaluate and treat, SLP evaluate and treat, PM&R evaluate for appropriate placement, when able     Diagnostics ordered/pending: None     VTE prophylaxis: Mechanical prophylaxis: Place SCDs; Xarelto as above    BP parameters: Infarct: No intervention, SBP <220    ARNULFO (acute kidney injury)  ARNULFO is likely due to pre-renal azotemia due to dehydration. Baseline creatinine is  ~1.0 . Most recent creatinine and eGFR are listed below.  Recent Labs     12/23/24  0421 12/24/24  0248 12/25/24  0325   CREATININE 1.1 0.9 1.3   EGFRNORACEVR 48.6* >60.0 39.8*      Plan  -  ARNULFO is worsening. Will adjust treatment as follows:  cc bolus x 1 then 75 cc/hr x 13 hours; hold Lasix  - Avoid nephrotoxins and renally dose meds for GFR listed above  - Monitor urine output, serial BMP, and adjust therapy as needed    Constipation  Miralax and senna-doc BID    CHF (congestive heart failure)  Hold home Lasix 20 mg po every other day due to ARNULFO    A-fib  Patient with paroxysmal (<7 days) atrial fibrillation which is controlled currently with Beta Blocker. Patient is currently in sinus rhythm.[id number] Score: 5. Patient is anticoagulated with Xarelto 15 mg po daily with dinner    GERD (gastroesophageal reflux disease)  Continue home protonix     Essential hypertension  Patients blood pressure range in the last 24 hours was: BP  Min: 101/58  Max: 162/69.The patient's inpatient anti-hypertensive regimen is listed below:  Current Antihypertensives  labetalol 20 mg/4 mL (5 mg/mL) IV syring, Every 15 min PRN, Intravenous  furosemide tablet 20 mg, Every other day, Oral  metoprolol tartrate (LOPRESSOR) tablet 50 mg, 2 times daily, Oral    Plan  - BP is controlled, no changes needed to their regimen    Urge incontinence  Resumed home oxybutinin   On purewick     Diabetes mellitus, type II  Patient's FSGs are controlled on current medication regimen.  Last A1c reviewed-   Lab Results   Component Value Date    HGBA1C 7.6 (H) 12/20/2024     Most recent fingerstick glucose reviewed-   Recent Labs   Lab 12/24/24  2114 12/25/24  0612 12/25/24  1059   POCTGLUCOSE 299* 232* 223*     Current correctional scale  Low  Maintain anti-hyperglycemic dose as follows-   Antihyperglycemics (From admission, onward)      Start     Stop Route Frequency Ordered    12/24/24 1645  insulin aspart U-100 pen 1 Units         -- SubQ 3 times daily with meals 12/24/24 1424    12/22/24 0900  insulin glargine U-100 (Lantus) pen 11 Units         -- SubQ Daily 12/22/24 0740    12/19/24 2307  insulin aspart U-100 pen 0-5 Units          -- SubQ Before meals & nightly PRN 12/19/24 2207          Hold Oral hypoglycemics while patient is in the hospital.         12/21/2024 - MATTI, VSS. F/u CTH w/ stable, delineated recent infarct in R. PCA territory. Holding rivaroxaban for the time being. Transaminitis being worked up via liver U/S. Blood glucose above the 140-180 target; added basal glargine.  12/22/2024 - MATTI, VSS. F/u CTH w/ stable, delineated recent infarct in R. PCA territory. Holding rivaroxaban for the time being. Transaminitis being worked up via liver U/S - s/f steatosis. Blood glucose trending higher, went up on basal by 2U.  12/23/2024 - NAEON. Working well with PT/OT (she stood up briefly!) Exam improved today. Resuming Xarelto tonight.   12/24/2024 - NAEON. Medically ready, pending SNF. Added bowel regimen.   12/25/2024 - NAEON. New ARNULFO, giving IVF.     STROKE DOCUMENTATION        NIH Scale:  1a. Level of Consciousness: 0-->Alert, keenly responsive  1b. LOC Questions: 0-->Answers both questions correctly  1c. LOC Commands: 0-->Performs both tasks correctly  2. Best Gaze: 1-->Partial gaze palsy, gaze is abnormal in one or both eyes, but forced deviation or total gaze paresis is not present  3. Visual: 1-->Partial hemianopia  4. Facial Palsy: 2-->Partial paralysis (total or near-total paralysis of lower face)  5a. Motor Arm, Left: 2-->Some effort against gravity, limb cannot get to or maintain (if cued) 90 (or 45) degrees, drifts down to bed, but has some effort against gravity  5b. Motor Arm, Right: 0-->No drift, limb holds 90 (or 45) degrees for full 10 secs  6a. Motor Leg, Left: 1-->Drift, leg falls by the end of the 5-sec period but does not hit bed  6b. Motor Leg, Right: 0-->No drift, leg holds 30 degree position for full 5 secs  7. Limb Ataxia: 0-->Absent  8. Sensory: 0-->Normal, no sensory loss  9. Best Language: 0-->No aphasia, normal  11. Extinction and Inattention (formerly Neglect): 0-->No abnormality       Modified [place] Coma Scale:15   ABCD2 Score:    [id number] Score:   HAS -BLED Score:   ICH Score:   Hunt & Wilson Classification:      Hemorrhagic change of an Ischemic Stroke: Does this patient have an ischemic stroke with hemorrhagic changes? No     Neurologic Chief Complaint: R PCA CVA    Subjective:     Interval History: Patient is seen for follow-up neurological assessment and treatment recommendations: See hospital course    HPI, Past Medical, Family, and Social History remains the same as documented in the initial encounter.     Review of Systems   Eyes:  Positive for visual disturbance.   Neurological:  Positive for weakness.     Scheduled Meds:   atorvastatin  40 mg Oral Daily    ciprofloxacin HCl  250 mg Oral Q12H    furosemide  20 mg Oral Every other day    gabapentin  300 mg Oral TID    insulin aspart U-100  1 Units Subcutaneous TIDWM    insulin glargine U-100  11 Units Subcutaneous Daily    metoprolol tartrate  50 mg Oral BID    oxybutynin  10 mg Oral Daily    pantoprazole  40 mg Oral QAM    polyethylene glycol  17 g Oral BID    rivaroxaban  15 mg Oral with dinner    senna-docusate 8.6-50 mg  1 tablet Oral BID     Continuous Infusions:   0.9% NaCl   Intravenous Continuous 75 mL/hr at 12/25/24 0845 New Bag at 12/25/24 0845     PRN Meds:  Current Facility-Administered Medications:     acetaminophen, 650 mg, Oral, Q6H PRN    bisacodyL, 10 mg, Rectal, Daily PRN    dextrose 50%, 12.5 g, Intravenous, PRN    dextrose 50%, 25 g, Intravenous, PRN    glucagon (human recombinant), 1 mg, Intramuscular, PRN    glucose, 16 g, Oral, PRN    glucose, 24 g, Oral, PRN    insulin aspart U-100, 0-5 Units, Subcutaneous, QID (AC + HS) PRN    labetalol, 10 mg, Intravenous, Q15 Min PRN    sodium chloride 0.9%, 10 mL, Intravenous, PRN    Objective:     Vital Signs (Most Recent):  Temp: 97.7 °F (36.5 °C) (12/25/24 1103)  Pulse: 67 (12/25/24 1103)  Resp: 18 (12/25/24 1103)  BP: (!) 140/64 (12/25/24 1103)  SpO2: 95 % (12/25/24 1103)  BP  Location: Right arm    Vital Signs Range (Last 24H):  Temp:  [97.5 °F (36.4 °C)-98.5 °F (36.9 °C)]   Pulse:  [67-90]   Resp:  [17-18]   BP: (122-142)/(58-70)   SpO2:  [92 %-96 %]   BP Location: Right arm       Physical Exam  Vitals and nursing note reviewed.   Constitutional:       Appearance: Normal appearance. She is normal weight.   HENT:      Head: Normocephalic and atraumatic.   Cardiovascular:      Rate and Rhythm: Normal rate and regular rhythm.   Pulmonary:      Effort: Pulmonary effort is normal. No respiratory distress.   Musculoskeletal:      Right lower leg: No edema.      Left lower leg: No edema.   Skin:     General: Skin is warm and dry.   Neurological:      Mental Status: She is alert.        Neurological Exam:   LOC: alert  Attention Span: Good   Language: No aphasia  Articulation: No dysarthria  Orientation: Person, Place, Time   Visual Fields: Hemianopsia left  EOM (CN III, IV, VI): Gaze preference  right    Laboratory:  CMP:   Recent Labs   Lab 12/25/24  0325   CALCIUM 9.2   ALBUMIN 2.7*   PROT 6.1      K 4.5   CO2 21*      BUN 31*   CREATININE 1.3   ALKPHOS 318*   *   AST 94*   BILITOT 0.4     CBC:   Recent Labs   Lab 12/25/24  0325   WBC 11.01   RBC 4.10   HGB 12.9   HCT 39.2      MCV 96   MCH 31.5*   MCHC 32.9     Lipid Panel:   Recent Labs   Lab 12/20/24  0717   CHOL 106*   LDLCALC 56.2*   HDL 37*   TRIG 64     Hgb A1C:   Recent Labs   Lab 12/20/24  0717   HGBA1C 7.6*     TSH:   Recent Labs   Lab 12/21/24  0340   TSH 0.437       Diagnostic Results     Brain Imaging:  CTH with subacute right MCA infarct      MRI brain with right medial temporo-occipital territory infarct (lateral thalamus) with proximal P1 segment PCA occlusion     Cardiac Imaging     Left Ventricle: The left ventricle is normal in size. Ventricular mass is normal. Normal wall thickness. There is concentric remodeling. Normal wall motion. There is normal systolic function with a visually estimated  ejection fraction of 60 - 65%. There is indeterminate diastolic function. Elevated left ventricular filling pressure.    Right Ventricle: Normal right ventricular cavity size. Wall thickness is normal. Systolic function is normal.    Left Atrium: Agitated saline study of the atrial septum is negative after vasalva maneuver, suggesting absence of intracardiac shunt at the atrial level.    Aortic Valve: There is mild annular calcification present.    Mitral Valve: There is moderate mitral annular calcification present.    Pulmonary Artery: Pulmonary artery pressure could not be estimated.    IVC/SVC: Normal venous pressure at 3 mmHg.    Maury Mcfarlane MD  Comprehensive Stroke Center  Department of Vascular Neurology   [facility])

## 2024-12-25 NOTE — ASSESSMENT & PLAN NOTE
Patient's FSGs are controlled on current medication regimen.  Last A1c reviewed-   Lab Results   Component Value Date    HGBA1C 7.6 (H) 12/20/2024     Most recent fingerstick glucose reviewed-   Recent Labs   Lab 12/24/24  2114 12/25/24  0612 12/25/24  1059   POCTGLUCOSE 299* 232* 223*     Current correctional scale  Low  Maintain anti-hyperglycemic dose as follows-   Antihyperglycemics (From admission, onward)      Start     Stop Route Frequency Ordered    12/24/24 1645  insulin aspart U-100 pen 1 Units         -- SubQ 3 times daily with meals 12/24/24 1424    12/22/24 0900  insulin glargine U-100 (Lantus) pen 11 Units         -- SubQ Daily 12/22/24 0740    12/19/24 2307  insulin aspart U-100 pen 0-5 Units         -- SubQ Before meals & nightly PRN 12/19/24 2207          Hold Oral hypoglycemics while patient is in the hospital.

## 2024-12-25 NOTE — SUBJECTIVE & OBJECTIVE
Neurologic Chief Complaint: R PCA CVA    Subjective:     Interval History: Patient is seen for follow-up neurological assessment and treatment recommendations: See hospital course    HPI, Past Medical, Family, and Social History remains the same as documented in the initial encounter.     Review of Systems   Eyes:  Positive for visual disturbance.   Neurological:  Positive for weakness.     Scheduled Meds:   atorvastatin  40 mg Oral Daily    ciprofloxacin HCl  250 mg Oral Q12H    furosemide  20 mg Oral Every other day    gabapentin  300 mg Oral TID    insulin aspart U-100  1 Units Subcutaneous TIDWM    insulin glargine U-100  11 Units Subcutaneous Daily    metoprolol tartrate  50 mg Oral BID    oxybutynin  10 mg Oral Daily    pantoprazole  40 mg Oral QAM    polyethylene glycol  17 g Oral BID    rivaroxaban  15 mg Oral with dinner    senna-docusate 8.6-50 mg  1 tablet Oral BID     Continuous Infusions:   0.9% NaCl   Intravenous Continuous 75 mL/hr at 12/25/24 0845 New Bag at 12/25/24 0845     PRN Meds:  Current Facility-Administered Medications:     acetaminophen, 650 mg, Oral, Q6H PRN    bisacodyL, 10 mg, Rectal, Daily PRN    dextrose 50%, 12.5 g, Intravenous, PRN    dextrose 50%, 25 g, Intravenous, PRN    glucagon (human recombinant), 1 mg, Intramuscular, PRN    glucose, 16 g, Oral, PRN    glucose, 24 g, Oral, PRN    insulin aspart U-100, 0-5 Units, Subcutaneous, QID (AC + HS) PRN    labetalol, 10 mg, Intravenous, Q15 Min PRN    sodium chloride 0.9%, 10 mL, Intravenous, PRN    Objective:     Vital Signs (Most Recent):  Temp: 97.7 °F (36.5 °C) (12/25/24 1103)  Pulse: 67 (12/25/24 1103)  Resp: 18 (12/25/24 1103)  BP: (!) 140/64 (12/25/24 1103)  SpO2: 95 % (12/25/24 1103)  BP Location: Right arm    Vital Signs Range (Last 24H):  Temp:  [97.5 °F (36.4 °C)-98.5 °F (36.9 °C)]   Pulse:  [67-90]   Resp:  [17-18]   BP: (122-142)/(58-70)   SpO2:  [92 %-96 %]   BP Location: Right arm       Physical Exam  Vitals and nursing  note reviewed.   Constitutional:       Appearance: Normal appearance. She is normal weight.   HENT:      Head: Normocephalic and atraumatic.   Cardiovascular:      Rate and Rhythm: Normal rate and regular rhythm.   Pulmonary:      Effort: Pulmonary effort is normal. No respiratory distress.   Musculoskeletal:      Right lower leg: No edema.      Left lower leg: No edema.   Skin:     General: Skin is warm and dry.   Neurological:      Mental Status: She is alert.        Neurological Exam:   LOC: alert  Attention Span: Good   Language: No aphasia  Articulation: No dysarthria  Orientation: Person, Place, Time   Visual Fields: Hemianopsia left  EOM (CN III, IV, VI): Gaze preference  right    Laboratory:  CMP:   Recent Labs   Lab 12/25/24  0325   CALCIUM 9.2   ALBUMIN 2.7*   PROT 6.1      K 4.5   CO2 21*      BUN 31*   CREATININE 1.3   ALKPHOS 318*   *   AST 94*   BILITOT 0.4     CBC:   Recent Labs   Lab 12/25/24  0325   WBC 11.01   RBC 4.10   HGB 12.9   HCT 39.2      MCV 96   MCH 31.5*   MCHC 32.9     Lipid Panel:   Recent Labs   Lab 12/20/24  0717   CHOL 106*   LDLCALC 56.2*   HDL 37*   TRIG 64     Hgb A1C:   Recent Labs   Lab 12/20/24  0717   HGBA1C 7.6*     TSH:   Recent Labs   Lab 12/21/24  0340   TSH 0.437       Diagnostic Results     Brain Imaging:  CTH with subacute right MCA infarct      MRI brain with right medial temporo-occipital territory infarct (lateral thalamus) with proximal P1 segment PCA occlusion     Cardiac Imaging     Left Ventricle: The left ventricle is normal in size. Ventricular mass is normal. Normal wall thickness. There is concentric remodeling. Normal wall motion. There is normal systolic function with a visually estimated ejection fraction of 60 - 65%. There is indeterminate diastolic function. Elevated left ventricular filling pressure.    Right Ventricle: Normal right ventricular cavity size. Wall thickness is normal. Systolic function is normal.    Left Atrium:  Agitated saline study of the atrial septum is negative after vasalva maneuver, suggesting absence of intracardiac shunt at the atrial level.    Aortic Valve: There is mild annular calcification present.    Mitral Valve: There is moderate mitral annular calcification present.    Pulmonary Artery: Pulmonary artery pressure could not be estimated.    IVC/SVC: Normal venous pressure at 3 mmHg.

## 2024-12-26 LAB
ALBUMIN SERPL BCP-MCNC: 2.7 G/DL (ref 3.5–5.2)
ALP SERPL-CCNC: 286 U/L (ref 40–150)
ALT SERPL W/O P-5'-P-CCNC: 110 U/L (ref 10–44)
ANION GAP SERPL CALC-SCNC: 9 MMOL/L (ref 8–16)
AST SERPL-CCNC: 68 U/L (ref 10–40)
BASOPHILS # BLD AUTO: 0.07 K/UL (ref 0–0.2)
BASOPHILS NFR BLD: 0.6 % (ref 0–1.9)
BILIRUB SERPL-MCNC: 0.4 MG/DL (ref 0.1–1)
BUN SERPL-MCNC: 27 MG/DL (ref 8–23)
CALCIUM SERPL-MCNC: 9.1 MG/DL (ref 8.7–10.5)
CHLORIDE SERPL-SCNC: 106 MMOL/L (ref 95–110)
CO2 SERPL-SCNC: 24 MMOL/L (ref 23–29)
CREAT SERPL-MCNC: 1 MG/DL (ref 0.5–1.4)
DIFFERENTIAL METHOD BLD: ABNORMAL
EOSINOPHIL # BLD AUTO: 0.2 K/UL (ref 0–0.5)
EOSINOPHIL NFR BLD: 1.9 % (ref 0–8)
ERYTHROCYTE [DISTWIDTH] IN BLOOD BY AUTOMATED COUNT: 11.9 % (ref 11.5–14.5)
EST. GFR  (NO RACE VARIABLE): 54.5 ML/MIN/1.73 M^2
GLUCOSE SERPL-MCNC: 168 MG/DL (ref 70–110)
HCT VFR BLD AUTO: 39.5 % (ref 37–48.5)
HGB BLD-MCNC: 12.6 G/DL (ref 12–16)
IMM GRANULOCYTES # BLD AUTO: 0.03 K/UL (ref 0–0.04)
IMM GRANULOCYTES NFR BLD AUTO: 0.3 % (ref 0–0.5)
LYMPHOCYTES # BLD AUTO: 2.6 K/UL (ref 1–4.8)
LYMPHOCYTES NFR BLD: 23.5 % (ref 18–48)
MAGNESIUM SERPL-MCNC: 2 MG/DL (ref 1.6–2.6)
MCH RBC QN AUTO: 31.2 PG (ref 27–31)
MCHC RBC AUTO-ENTMCNC: 31.9 G/DL (ref 32–36)
MCV RBC AUTO: 98 FL (ref 82–98)
MONOCYTES # BLD AUTO: 0.7 K/UL (ref 0.3–1)
MONOCYTES NFR BLD: 6.5 % (ref 4–15)
NEUTROPHILS # BLD AUTO: 7.3 K/UL (ref 1.8–7.7)
NEUTROPHILS NFR BLD: 67.2 % (ref 38–73)
NRBC BLD-RTO: 0 /100 WBC
PHOSPHATE SERPL-MCNC: 2.9 MG/DL (ref 2.7–4.5)
PLATELET # BLD AUTO: 282 K/UL (ref 150–450)
PMV BLD AUTO: 10.6 FL (ref 9.2–12.9)
POCT GLUCOSE: 118 MG/DL (ref 70–110)
POCT GLUCOSE: 162 MG/DL (ref 70–110)
POCT GLUCOSE: 198 MG/DL (ref 70–110)
POCT GLUCOSE: 207 MG/DL (ref 70–110)
POTASSIUM SERPL-SCNC: 3.8 MMOL/L (ref 3.5–5.1)
PROT SERPL-MCNC: 6.1 G/DL (ref 6–8.4)
RBC # BLD AUTO: 4.04 M/UL (ref 4–5.4)
SODIUM SERPL-SCNC: 139 MMOL/L (ref 136–145)
WBC # BLD AUTO: 10.91 K/UL (ref 3.9–12.7)

## 2024-12-26 PROCEDURE — 85025 COMPLETE CBC W/AUTO DIFF WBC: CPT

## 2024-12-26 PROCEDURE — 97530 THERAPEUTIC ACTIVITIES: CPT

## 2024-12-26 PROCEDURE — 99233 SBSQ HOSP IP/OBS HIGH 50: CPT | Mod: ,,, | Performed by: STUDENT IN AN ORGANIZED HEALTH CARE EDUCATION/TRAINING PROGRAM

## 2024-12-26 PROCEDURE — 97112 NEUROMUSCULAR REEDUCATION: CPT

## 2024-12-26 PROCEDURE — 36415 COLL VENOUS BLD VENIPUNCTURE: CPT

## 2024-12-26 PROCEDURE — 97535 SELF CARE MNGMENT TRAINING: CPT

## 2024-12-26 PROCEDURE — 25000003 PHARM REV CODE 250

## 2024-12-26 PROCEDURE — 11000001 HC ACUTE MED/SURG PRIVATE ROOM

## 2024-12-26 PROCEDURE — 92507 TX SP LANG VOICE COMM INDIV: CPT

## 2024-12-26 PROCEDURE — 84100 ASSAY OF PHOSPHORUS: CPT

## 2024-12-26 PROCEDURE — 80053 COMPREHEN METABOLIC PANEL: CPT

## 2024-12-26 PROCEDURE — 83735 ASSAY OF MAGNESIUM: CPT

## 2024-12-26 RX ORDER — INSULIN GLARGINE 100 [IU]/ML
12 INJECTION, SOLUTION SUBCUTANEOUS DAILY
Status: DISCONTINUED | OUTPATIENT
Start: 2024-12-27 | End: 2024-12-27

## 2024-12-26 RX ADMIN — INSULIN ASPART 2 UNITS: 100 INJECTION, SOLUTION INTRAVENOUS; SUBCUTANEOUS at 01:12

## 2024-12-26 RX ADMIN — METOPROLOL TARTRATE 50 MG: 50 TABLET, FILM COATED ORAL at 09:12

## 2024-12-26 RX ADMIN — SENNOSIDES AND DOCUSATE SODIUM 1 TABLET: 50; 8.6 TABLET ORAL at 09:12

## 2024-12-26 RX ADMIN — PANTOPRAZOLE SODIUM 40 MG: 20 TABLET, DELAYED RELEASE ORAL at 06:12

## 2024-12-26 RX ADMIN — OXYBUTYNIN CHLORIDE 10 MG: 10 TABLET, EXTENDED RELEASE ORAL at 09:12

## 2024-12-26 RX ADMIN — INSULIN ASPART 1 UNITS: 100 INJECTION, SOLUTION INTRAVENOUS; SUBCUTANEOUS at 06:12

## 2024-12-26 RX ADMIN — GABAPENTIN 300 MG: 300 CAPSULE ORAL at 02:12

## 2024-12-26 RX ADMIN — POLYETHYLENE GLYCOL 3350 17 G: 17 POWDER, FOR SOLUTION ORAL at 09:12

## 2024-12-26 RX ADMIN — GABAPENTIN 300 MG: 300 CAPSULE ORAL at 09:12

## 2024-12-26 RX ADMIN — CIPROFLOXACIN HYDROCHLORIDE 250 MG: 250 TABLET, FILM COATED ORAL at 09:12

## 2024-12-26 RX ADMIN — INSULIN ASPART 1 UNITS: 100 INJECTION, SOLUTION INTRAVENOUS; SUBCUTANEOUS at 09:12

## 2024-12-26 RX ADMIN — ATORVASTATIN CALCIUM 40 MG: 40 TABLET, FILM COATED ORAL at 09:12

## 2024-12-26 RX ADMIN — RIVAROXABAN 15 MG: 15 TABLET, FILM COATED ORAL at 06:12

## 2024-12-26 RX ADMIN — INSULIN ASPART 1 UNITS: 100 INJECTION, SOLUTION INTRAVENOUS; SUBCUTANEOUS at 01:12

## 2024-12-26 RX ADMIN — INSULIN GLARGINE 11 UNITS: 100 INJECTION, SOLUTION SUBCUTANEOUS at 09:12

## 2024-12-26 NOTE — ASSESSMENT & PLAN NOTE
Patient with paroxysmal (<7 days) atrial fibrillation which is controlled currently with Beta Blocker. Patient is currently in sinus rhythm.[id number] Score: 5. Patient is anticoagulated with Xarelto 15 mg po daily with dinner

## 2024-12-26 NOTE — ASSESSMENT & PLAN NOTE
ARNULFO is likely due to pre-renal azotemia due to dehydration. Baseline creatinine is  ~1.0 . Most recent creatinine and eGFR are listed below.  Recent Labs     12/25/24  0325 12/25/24  1639 12/26/24  0443   CREATININE 1.3 1.1 1.0   EGFRNORACEVR 39.8* 48.6* 54.5*        Plan  - ARNULFO is resolved  - Avoid nephrotoxins and renally dose meds for GFR listed above  - Monitor urine output, serial BMP, and adjust therapy as needed

## 2024-12-26 NOTE — SUBJECTIVE & OBJECTIVE
Neurologic Chief Complaint: R PCA CVA    Subjective:     Interval History: Patient is seen for follow-up neurological assessment and treatment recommendations: See hospital course    HPI, Past Medical, Family, and Social History remains the same as documented in the initial encounter.     Review of Systems   Eyes:  Positive for visual disturbance.   Neurological:  Positive for weakness.     Scheduled Meds:   atorvastatin  40 mg Oral Daily    ciprofloxacin HCl  250 mg Oral Q12H    gabapentin  300 mg Oral TID    insulin aspart U-100  1 Units Subcutaneous TIDWM    insulin glargine U-100  11 Units Subcutaneous Daily    metoprolol tartrate  50 mg Oral BID    oxybutynin  10 mg Oral Daily    pantoprazole  40 mg Oral QAM    polyethylene glycol  17 g Oral BID    rivaroxaban  15 mg Oral with dinner    senna-docusate 8.6-50 mg  1 tablet Oral BID     Continuous Infusions:      PRN Meds:  Current Facility-Administered Medications:     acetaminophen, 650 mg, Oral, Q6H PRN    bisacodyL, 10 mg, Rectal, Daily PRN    dextrose 50%, 12.5 g, Intravenous, PRN    dextrose 50%, 25 g, Intravenous, PRN    glucagon (human recombinant), 1 mg, Intramuscular, PRN    glucose, 16 g, Oral, PRN    glucose, 24 g, Oral, PRN    insulin aspart U-100, 0-5 Units, Subcutaneous, QID (AC + HS) PRN    labetalol, 10 mg, Intravenous, Q15 Min PRN    sodium chloride 0.9%, 10 mL, Intravenous, PRN    Objective:     Vital Signs (Most Recent):  Temp: 98 °F (36.7 °C) (12/26/24 1125)  Pulse: 69 (12/26/24 1125)  Resp: 16 (12/26/24 1125)  BP: 138/60 (12/26/24 1125)  SpO2: 96 % (12/26/24 1125)  BP Location: Right arm    Vital Signs Range (Last 24H):  Temp:  [97.5 °F (36.4 °C)-98.2 °F (36.8 °C)]   Pulse:  [63-74]   Resp:  [16-18]   BP: (114-142)/(57-64)   SpO2:  [93 %-96 %]   BP Location: Right arm       Physical Exam  Vitals and nursing note reviewed.   Constitutional:       Appearance: Normal appearance. She is normal weight.   HENT:      Head: Normocephalic and  atraumatic.   Cardiovascular:      Rate and Rhythm: Normal rate and regular rhythm.   Pulmonary:      Effort: Pulmonary effort is normal. No respiratory distress.   Musculoskeletal:      Right lower leg: No edema.      Left lower leg: No edema.   Skin:     General: Skin is warm and dry.   Neurological:      Mental Status: She is alert.        Neurological Exam:   LOC: alert  Attention Span: Good   Language: No aphasia  Articulation: No dysarthria  Orientation: Person, Place, Time   Visual Fields: Hemianopsia left  EOM (CN III, IV, VI): Gaze preference  right    Laboratory:  CMP:   Recent Labs   Lab 12/26/24  0443   CALCIUM 9.1   ALBUMIN 2.7*   PROT 6.1      K 3.8   CO2 24      BUN 27*   CREATININE 1.0   ALKPHOS 286*   *   AST 68*   BILITOT 0.4     CBC:   Recent Labs   Lab 12/26/24  0443   WBC 10.91   RBC 4.04   HGB 12.6   HCT 39.5      MCV 98   MCH 31.2*   MCHC 31.9*     Lipid Panel:   Recent Labs   Lab 12/20/24  0717   CHOL 106*   LDLCALC 56.2*   HDL 37*   TRIG 64     Hgb A1C:   Recent Labs   Lab 12/20/24  0717   HGBA1C 7.6*     TSH:   Recent Labs   Lab 12/21/24  0340   TSH 0.437       Diagnostic Results     Brain Imaging:  CTH with subacute right MCA infarct      MRI brain with right medial temporo-occipital territory infarct (lateral thalamus) with proximal P1 segment PCA occlusion     Cardiac Imaging     Left Ventricle: The left ventricle is normal in size. Ventricular mass is normal. Normal wall thickness. There is concentric remodeling. Normal wall motion. There is normal systolic function with a visually estimated ejection fraction of 60 - 65%. There is indeterminate diastolic function. Elevated left ventricular filling pressure.    Right Ventricle: Normal right ventricular cavity size. Wall thickness is normal. Systolic function is normal.    Left Atrium: Agitated saline study of the atrial septum is negative after vasalva maneuver, suggesting absence of intracardiac shunt at the  atrial level.    Aortic Valve: There is mild annular calcification present.    Mitral Valve: There is moderate mitral annular calcification present.    Pulmonary Artery: Pulmonary artery pressure could not be estimated.    IVC/SVC: Normal venous pressure at 3 mmHg.

## 2024-12-26 NOTE — PLAN OF CARE
12/26/24 1506   Post-Acute Status   Post-Acute Authorization Placement   Post-Acute Placement Status Referrals Sent   Discharge Delays (!) Post-Acute Set-up   Discharge Plan   Discharge Plan A Skilled Nursing Facility     EDGAR received phone call from patient's dtr Nuzhat.  She asked that referral be sent to [facility], who have recently opened 4 swing beds for skilled care.  EDGAR called [facility] and spoke with Sriniavsa at [phone].  He confirmed these are available.  He requested packet be faxed to [phone].  EDGAR sent packet and he confirmed it was received and sent to the  for review with the insurance company.  EDGAR updated dtr Nuzhat.      Discharge Plan A and Plan B have been determined by review of patient's clinical status, future medical and therapeutic needs, and coverage/benefits for post-acute care in coordination with multidisciplinary team members.    Tamara Schwartz, MARIA [facility]ARIO  [facility]  [phone]

## 2024-12-26 NOTE — PT/OT/SLP PROGRESS
Occupational Therapy  Co- Treatment    Name: Willa Kim  MRN: [id number]  Admitting Diagnosis:  Stroke due to occlusion of right posterior cerebral artery       Recommendations:     Discharge Recommendations: Moderate Intensity Therapy  Discharge Equipment Recommendations:  hospital bed, lift device  Barriers to discharge:  None    Assessment:     Willa Kim is a 89 y.o. female with a medical diagnosis of Stroke due to occlusion of right posterior cerebral artery.  She presents with decrease functional status secondary to medical diagnosis. Performance deficits affecting function are weakness, impaired endurance, impaired self care skills, impaired functional mobility, decreased lower extremity function, gait instability, decreased upper extremity function, decreased safety awareness, impaired fine motor, decreased coordination, decreased ROM. Patient with fair tolerance to OT session this date limited by globalized weakness, L neglect, and L weakness. Patient session with focus on upright sitting and ADL completion. Patient able to visually scan to L with increased verbal cueing for completion of self care tasks. Patient remains appropriate candidate for acute OT services.     Rehab Prognosis:  Good; patient would benefit from acute skilled OT services to address these deficits and reach maximum level of function.       Plan:     Patient to be seen 4 x/week to address the above listed problems via self-care/home management, therapeutic activities, therapeutic exercises, neuromuscular re-education  Plan of Care Expires: 01/20/25  Plan of Care Reviewed with: patient    Subjective     Chief Complaint: none   Patient/Family Comments/goals: DC   Pain/Comfort:  Pain Rating 1: 0/10    Objective:     Communicated with: RN prior to session.  Patient found supine with SCD, telemetry, bed alarm, PureWick upon OT entry to room.    General Precautions: Standard, aspiration, fall    Orthopedic  Precautions:N/A  Braces: N/A  Respiratory Status: Room air     Occupational Performance:     Bed Mobility:    Patient completed Supine to Sit with maximal assistance and 2 persons  Patient completed Sit to Supine with maximal assistance and 2 persons   Patient sat EOB with min-max assist w/ short bouts of CGA with RUE support; patient needing increased assistance for postural stability and trunk support. While sitting EOB patient completed the following:   ADL completion with emphasis on L placement for increased visual attention to plate   AAROM/AROM LUE at all joints     Functional Mobility/Transfers:  Patient completed Sit <> Stand Transfer with maximal assistance and of 2 persons  with  hand-held assist     Activities of Daily Living:  Grooming: minimum assistance for attention when completing feeding sitting EOB and medication management   Patient able to utilize LUE for medication holding while using RUE for pill selection and intake     Treatment & Education:  Co-Treatment conducted due to patient medical instability and to promote patient safety. Provided education regarding role of OT, POC, & discharge recommendations.  Pt with no further questions/concerns at this time. OT provided education on home recommendations and fall prevention in preparation for D/C.     Patient left supine with all lines intact and call button in reach    GOALS:   Multidisciplinary Problems       Occupational Therapy Goals          Problem: Occupational Therapy    Goal Priority Disciplines Outcome Interventions   Occupational Therapy Goal     OT, PT/OT Progressing    Description: Goals to be met by: 1/20/24     Patient will increase functional independence with ADLs by performing:    UE Dressing with Moderate Assistance.  LE Dressing with Moderate Assistance.  Grooming while EOB with Minimal Assistance.  Toileting from bedside commode with Maximum Assistance for hygiene and clothing management.   Sitting at edge of bed x10 minutes  with Supervision.  Supine to sit with Supervision.  Stand pivot transfers with Maximum Assistance.  Step transfer with Maximum Assistance  Toilet transfer to bedside commode with Maximum Assistance.                         Time Tracking:     OT Date of Treatment: 12/26/24  OT Start Time: 0933  OT Stop Time: 1001  OT Total Time (min): 28 min    Billable Minutes:Self Care/Home Management 14  Therapeutic Activity 14    OT/ISAAC: OT          12/26/2024

## 2024-12-26 NOTE — PLAN OF CARE
Problem: Adult Inpatient Plan of Care  Goal: Plan of Care Review  Outcome: Progressing  Goal: Patient-Specific Goal (Individualized)  Description: Pt will maintain sbp below 220.  Outcome: Progressing  Goal: Absence of Hospital-Acquired Illness or Injury  Outcome: Progressing  Goal: Optimal Comfort and Wellbeing  Outcome: Progressing  Goal: Readiness for Transition of Care  Outcome: Progressing     Problem: Diabetes Comorbidity  Goal: Blood Glucose Level Within Targeted Range  Outcome: Progressing     Problem: Wound  Goal: Optimal Coping  Outcome: Progressing  Goal: Optimal Functional Ability  Outcome: Progressing  Goal: Absence of Infection Signs and Symptoms  Outcome: Progressing  Goal: Improved Oral Intake  Outcome: Progressing  Goal: Optimal Pain Control and Function  Outcome: Progressing  Goal: Skin Health and Integrity  Outcome: Progressing  Goal: Optimal Wound Healing  Outcome: Progressing     Problem: Infection  Goal: Absence of Infection Signs and Symptoms  Outcome: Progressing     Problem: Stroke, Ischemic (Includes Transient Ischemic Attack)  Goal: Optimal Coping  Outcome: Progressing  Goal: Effective Bowel Elimination  Outcome: Progressing  Goal: Optimal Cerebral Tissue Perfusion  Outcome: Progressing  Goal: Optimal Cognitive Function  Outcome: Progressing  Goal: Improved Communication Skills  Outcome: Progressing  Goal: Optimal Functional Ability  Outcome: Progressing  Goal: Optimal Nutrition Intake  Outcome: Progressing  Goal: Effective Oxygenation and Ventilation  Outcome: Progressing  Goal: Improved Sensorimotor Function  Outcome: Progressing  Goal: Safe and Effective Swallow  Outcome: Progressing  Goal: Effective Urinary Elimination  Outcome: Progressing     Problem: Skin Injury Risk Increased  Goal: Skin Health and Integrity  Outcome: Progressing     Problem: Fall Injury Risk  Goal: Absence of Fall and Fall-Related Injury  Outcome: Progressing     Problem: Acute Kidney Injury/Impairment  Goal:  Fluid and Electrolyte Balance  Outcome: Progressing  Goal: Improved Oral Intake  Outcome: Progressing  Goal: Effective Renal Function  Outcome: Progressing

## 2024-12-26 NOTE — PT/OT/SLP PROGRESS
Speech Language Pathology Treatment    Patient Name:  Willa Kim   MRN:  [id number]  Admitting Diagnosis: Stroke due to occlusion of right posterior cerebral artery    Recommendations:                 General Recommendations:  Cognitive-linguistic therapy  Diet recommendations:  Regular Diet - IDDSI Level 7, Liquid Diet Level: Thin liquids - IDDSI Level 0   Aspiration Precautions: Assistance with meals, Feed only when awake/alert, Frequent oral care, HOB to 90 degrees, Meds whole 1 at a time, Small bites/sips, and Standard aspiration precautions   General Precautions: Standard, aspiration, fall  Communication strategies:  provide increased time to answer    Assessment:     Willa Kim is a 89 y.o. female with an SLP diagnosis of Cognitive-Linguistic Impairment.  She presents with L sided neglect.    Subjective     Pt awake and alert upon arrival. Pt agreeable to participate in session.     Pain/Comfort:  Pain Rating 1: 0/10    Respiratory Status: Room air    Objective:     Has the patient been evaluated by SLP for swallowing?   Yes  Keep patient NPO? No   Current Respiratory Status:        Pt seen for ongoing dysphagia therapy. No family present at bedside this date. Pt oriented to self and month but required cues to obtain year. Pt with continued L neglect. Pt required moderate cues to attend to SLP positioned on pt's L. Pt was unable to identify objects placed on her L despite cues. Pt identified objects at midline with 100% accuracy independently.Pt recalling person information with 100% accuracy but required increased processing time. Pt unable to recall orientation information after a delay. Pt reports she is at her baseline in all aspects beside the L neglect. Unsure of pt's baseline. SLP provided education regarding role of SLP, L neglect and strategies, recommendations, and SLP POC. Pt expressed understanding but would benefit from reinforcement. SLP will continue to follow.      Goals:    Multidisciplinary Problems       SLP Goals          Problem: SLP    Goal Priority Disciplines Outcome   SLP Goal     SLP Progressing   Description: Speech Language Pathology Goals  Goals expected to be met by 1/3    1. Pt will tolerate least restrictive and safest diet to maintain hydration and nutritional needs without signs of airway compromise.  2. Pt will orient x4  3. Pt will answer complex y/n questions with 80% accuracy  4. Pt will complete functional naming tasks with 80% accuracy  5. Pt will participate in ongoing assessment of reading, writing, and visual-spatial skills.                               Plan:     Patient to be seen:  3 x/week   Plan of Care expires:  01/17/25  Plan of Care reviewed with:  patient   SLP Follow-Up:  Yes       Discharge recommendations:  Moderate Intensity Therapy     Time Tracking:     SLP Treatment Date:   12/26/24  Speech Start Time:  1119  Speech Stop Time:  1128     Speech Total Time (min):  9 min    Billable Minutes: Speech Therapy Individual 9    12/26/2024

## 2024-12-26 NOTE — ASSESSMENT & PLAN NOTE
Patient's FSGs are uncontrolled due to hyperglycemia on current medication regimen.  Last A1c reviewed-   Lab Results   Component Value Date    HGBA1C 7.6 (H) 12/20/2024     Most recent fingerstick glucose reviewed-   Recent Labs   Lab 12/25/24  1552 12/25/24  1941 12/26/24  0737 12/26/24  1127   POCTGLUCOSE 227* 198* 198* 207*     Current correctional scale  Low  Increase anti-hyperglycemic dose as follows-   Antihyperglycemics (From admission, onward)      Start     Stop Route Frequency Ordered    12/27/24 0900  insulin glargine U-100 (Lantus) pen 12 Units         -- SubQ Daily 12/26/24 1356    12/24/24 1645  insulin aspart U-100 pen 1 Units         -- SubQ 3 times daily with meals 12/24/24 1424    12/19/24 2307  insulin aspart U-100 pen 0-5 Units         -- SubQ Before meals & nightly PRN 12/19/24 2207          Hold Oral hypoglycemics while patient is in the hospital.

## 2024-12-26 NOTE — PT/OT/SLP PROGRESS
Physical Therapy Co-Treatment    Patient Name: Willa Kim   MRN: [id number]    Co-treatment performed for this visit due to patient need for two skilled therapists to ensure patient and staff safety and to accommodate for patient activity tolerance/pain management   Recommendations:     Discharge Recommendations: Moderate Intensity Therapy  Discharge Equipment Recommendations: hospital bed, lift device  Barriers to Discharge: Increased level of assist and Decreased caregiver support at current functional status  Safest Mobility Level with Nursing: Bed Level ROM - Pt only safe for OOB mobility with therapy staff at this time    Assessment:     Willa Kim is a 89 y.o. 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 She presents with the following impairments/functional limitations: weakness, impaired endurance, impaired self care skills, impaired functional mobility, impaired sensation, gait instability, impaired balance, decreased safety awareness, decreased lower extremity function, decreased upper extremity function, impaired fine motor, impaired coordination, visual deficits, decreased ROM. Pt presenting with HOB elevated and agreeable to completing therapy session. Pt having just been set up by PCT to eat breakfast, so treatment focused on self-feeding, sitting tolerance, visual scanning, L sided attention, and core activation while seated at EOB. Pt continues to require increased assistance to complete all visualized functional mobility with mild improvements noted this date in seated postural stability and maintenance of midline orientation. Pt remains appropriate for moderate intensity therapy following discharge once medically stable. Pt would continue to benefit from skilled acute PT in order to address current deficits and progress functional mobility.     Rehab Prognosis: Good; patient continues to benefit from acute skilled PT  "services to address these deficits and reach maximum level of function.  Recent Surgery: * No surgery found *      Plan:     During this hospitalization, patient to be seen 4 x/week to address the identified rehab impairments via gait training, therapeutic activities, therapeutic exercises, neuromuscular re-education and progress toward the following goals:    Plan of Care Expires:  01/20/25    Subjective     Chief Complaint: None verbalized  Patient/Family Comments/Goals: "The food is the same as before. Not good."  Pain/Comfort:  Pain Rating 1: 0/10    Objective:     Communicated with RN prior to session. Patient found HOB elevated with SCD, telemetry, PureWick, bed alarm upon PT entry to room.     General Precautions: Standard, aspiration, fall  Orthopedic Precautions: N/A  Braces: N/A    Functional Mobility:  Bed Mobility:  Verbal cues for sequencing and technique  Rolling Right: maximal assistance of 2 persons  Scooting: total assistance  Supine to Sit: maximal assistance of 2 persons for LE management and trunk management  Sit to Supine: maximal assistance of 2 persons for LE management and trunk management  Transfers:    Sit to Stand: x1 rep from EOB; total assistance of 2 persons with no AD with cues for hand placement and foot placement  Verbal cues for increased use of momentum, improved anterior weight shift, and increased BLE motor activation  Balance:   Static Sitting: Poor, able to maintain for 20 minute(s) with mod-maximal assistance 2/2 L posterolateral lean  Verbal and tactile cues provided for upright posture, increased cervical extension, maintenance of midline orientation, anterior trunk lean, core activation, command following, and improved used of BUE for support  Dynamic Sitting: Poor: Patient unable to accept challenge or move without loss of balance, maximal assistance  Pt completed self-feeding while seated at EOB with emphasis on improving L sided attention, L visual scanning, core " control, and midline orientation.  Static Standing: Poor, able to maintain for 3 seconds with total assistance of 2 persons  Verbal cues for upright posture, increased hip extension, increased knee extension, and maintenance of midline orientation  Dynamic Standing: not assessed this visit    AM-PAC 6 CLICK MOBILITY  Turning over in bed (including adjusting bedclothes, sheets and blankets)?: 2  Sitting down on and standing up from a chair with arms (e.g., wheelchair, bedside commode, etc.): 1  Moving from lying on back to sitting on the side of the bed?: 1  Moving to and from a bed to a chair (including a wheelchair)?: 1  Need to walk in hospital room?: 1  Climbing 3-5 steps with a railing?: 1  Basic Mobility Total Score: 7     Therapeutic Activities and Exercises:  Patient educated on role of acute care PT and PT POC, safety while in hospital including calling nurse for mobility, and call light usage  Pt educated on importance of maximal participation in therapy session in order to reduce negative effects of prolonged sedentary positioning.   Answered all questions within PT scope of practice and addressed functional mobility concerns.    Patient left HOB elevated with all lines intact, call button in reach, and RN notified.    GOALS:   Multidisciplinary Problems       Physical Therapy Goals          Problem: Physical Therapy    Goal Priority Disciplines Outcome Interventions   Physical Therapy Goal     PT, PT/OT Progressing    Description: Goals to be met by: 2025     Patient will increase functional independence with mobility by performin. Supine to sit with Moderate Assistance  2. Sit to supine with Moderate Assistance  3. Sit to stand transfer with Maximum Assistance  4. Bed to chair transfer with Maximum Assistance using LRAD  5. Gait  x 5 feet with Maximum Assistance using LRAD.   6. Sitting at edge of bed x5 minutes with Minimal Assistance  7. Lower extremity exercise program x15 reps per  handout, with assistance as needed    Hospital Bed - Patient requires a hospital bed for positioning of the body in ways that are not feasible with an ordinary bed. The patient requires special positioning for pain relief, limited mobility, and/or being unable to independently make changes in body position without the use of a hospital bed. Pillows and wedges will not be adequate for resolving these positional issues.                            Time Tracking:     PT Received On: 12/26/24  PT Start Time: 0933     PT Stop Time: 1002  PT Total Time (min): 29 min     Billable Minutes: Therapeutic Activity 15 and Neuromuscular Re-education 14      Treatment Type: Treatment  PT/PTA: PT     Number of PTA visits since last PT visit: 0     12/26/2024

## 2024-12-26 NOTE — PLAN OF CARE
Problem: Adult Inpatient Plan of Care  Goal: Plan of Care Review  12/26/2024 0521 by Ophelia Pelaez RN  Outcome: Progressing  12/26/2024 0214 by Ophelia Pelaez RN  Outcome: Progressing  Goal: Patient-Specific Goal (Individualized)  Description: Pt will maintain sbp below 220.  12/26/2024 0521 by Ophelia Pelaez RN  Outcome: Progressing  12/26/2024 0214 by Ophelia Pelaez RN  Outcome: Progressing  Goal: Absence of Hospital-Acquired Illness or Injury  12/26/2024 0521 by Ophelia Pelaez RN  Outcome: Progressing  12/26/2024 0214 by Ophelia Pelaez RN  Outcome: Progressing  Goal: Optimal Comfort and Wellbeing  12/26/2024 0521 by Ophelia Pelaez RN  Outcome: Progressing  12/26/2024 0214 by Ophelia Pelaez RN  Outcome: Progressing  Goal: Readiness for Transition of Care  12/26/2024 0521 by Ophelia Pelaez RN  Outcome: Progressing  12/26/2024 0214 by Ophelia Pelaez RN  Outcome: Progressing     Problem: Diabetes Comorbidity  Goal: Blood Glucose Level Within Targeted Range  12/26/2024 0521 by Ophelia Pelaez RN  Outcome: Progressing  12/26/2024 0214 by Ophelia Pelaez RN  Outcome: Progressing     Problem: Wound  Goal: Optimal Coping  12/26/2024 0521 by Ophelia Pleaez RN  Outcome: Progressing  12/26/2024 0214 by Ophelia Pelaez RN  Outcome: Progressing  Goal: Optimal Functional Ability  12/26/2024 0521 by Ophelia Pelaez RN  Outcome: Progressing  12/26/2024 0214 by Ophelia Pelaez RN  Outcome: Progressing  Goal: Absence of Infection Signs and Symptoms  12/26/2024 0521 by Ophelia Pelaez RN  Outcome: Progressing  12/26/2024 0214 by Ophelia Pelaez RN  Outcome: Progressing  Goal: Improved Oral Intake  12/26/2024 0521 by Ophelia Pelaez RN  Outcome: Progressing  12/26/2024 0214 by Ophelia Pelaez RN  Outcome: Progressing  Goal: Optimal Pain Control and Function  12/26/2024 0521 by Ophelia Pelaez, RN  Outcome: Progressing  12/26/2024 0214 by Ophelia Pelaez, RN  Outcome: Progressing  Goal: Skin Health and Integrity  12/26/2024 0521 by Benigno,  LUIS Jacinto  Outcome: Progressing  12/26/2024 0214 by Ophelia Pelaez, RN  Outcome: Progressing  Goal: Optimal Wound Healing  12/26/2024 0521 by Ophelia Pelaez RN  Outcome: Progressing  12/26/2024 0214 by Ophelia Pelaez RN  Outcome: Progressing     Problem: Infection  Goal: Absence of Infection Signs and Symptoms  12/26/2024 0521 by Ophelia Pelaez, RN  Outcome: Progressing  12/26/2024 0214 by Ophelia Pelaez, RN  Outcome: Progressing     Problem: Stroke, Ischemic (Includes Transient Ischemic Attack)  Goal: Optimal Coping  12/26/2024 0521 by Ophelia Pelaez, RN  Outcome: Progressing  12/26/2024 0214 by Ophelia Pelaez, RN  Outcome: Progressing  Goal: Effective Bowel Elimination  12/26/2024 0521 by Ophelia Pelaez, RN  Outcome: Progressing  12/26/2024 0214 by Ophelia Pelaez, RN  Outcome: Progressing  Goal: Optimal Cerebral Tissue Perfusion  12/26/2024 0521 by Ophelia Pelaez RN  Outcome: Progressing  12/26/2024 0214 by Ophelia Pelaez, RN  Outcome: Progressing  Goal: Optimal Cognitive Function  12/26/2024 0521 by Ophelia Pelaez, RN  Outcome: Progressing  12/26/2024 0214 by Ophelia Pelaez, RN  Outcome: Progressing  Goal: Improved Communication Skills  12/26/2024 0521 by Ophelia Pelaez, RN  Outcome: Progressing  12/26/2024 0214 by Ophelia Pelaez, RN  Outcome: Progressing  Goal: Optimal Functional Ability  12/26/2024 0521 by Ophelia Pelaez, RN  Outcome: Progressing  12/26/2024 0214 by Ophelia Pelaez, RN  Outcome: Progressing  Goal: Optimal Nutrition Intake  12/26/2024 0521 by Ophelia Pelaez, RN  Outcome: Progressing  12/26/2024 0214 by Ophelia Pelaez, RN  Outcome: Progressing  Goal: Effective Oxygenation and Ventilation  12/26/2024 0521 by Ophelia Pelaez, RN  Outcome: Progressing  12/26/2024 0214 by Ophelia Pelaez, RN  Outcome: Progressing  Goal: Improved Sensorimotor Function  12/26/2024 0521 by Ophelia Pelaez, RN  Outcome: Progressing  12/26/2024 0214 by Ophelia Pelaez, RN  Outcome: Progressing  Goal: Safe and Effective Swallow  12/26/2024 0521 by  Benigno, Ophelia, RN  Outcome: Progressing  12/26/2024 0214 by Ophelia Pelaez RN  Outcome: Progressing  Goal: Effective Urinary Elimination  12/26/2024 0521 by Ophelia Pelaez RN  Outcome: Progressing  12/26/2024 0214 by Ophelia Pelaez RN  Outcome: Progressing     Problem: Skin Injury Risk Increased  Goal: Skin Health and Integrity  12/26/2024 0521 by Ophelia ePlaez RN  Outcome: Progressing  12/26/2024 0214 by Ophelia Pelaez RN  Outcome: Progressing     Problem: Fall Injury Risk  Goal: Absence of Fall and Fall-Related Injury  12/26/2024 0521 by Ophelia Pelaez RN  Outcome: Progressing  12/26/2024 0214 by Ophelia Pelaez RN  Outcome: Progressing     Problem: Acute Kidney Injury/Impairment  Goal: Fluid and Electrolyte Balance  12/26/2024 0521 by Ophelia Pelaez RN  Outcome: Progressing  12/26/2024 0214 by Ophelia Pelaez RN  Outcome: Progressing  Goal: Improved Oral Intake  12/26/2024 0521 by Ophelia Pelaez RN  Outcome: Progressing  12/26/2024 0214 by Ophelia Pelaez RN  Outcome: Progressing  Goal: Effective Renal Function  12/26/2024 0521 by Ophelia Pelaez RN  Outcome: Progressing  12/26/2024 0214 by Ophelia Pelaez RN  Outcome: Progressing

## 2024-12-26 NOTE — ASSESSMENT & PLAN NOTE
Patients blood pressure range in the last 24 hours was: BP  Min: 101/58  Max: 162/69.The patient's inpatient anti-hypertensive regimen is listed below:  Current Antihypertensives  labetalol 20 mg/4 mL (5 mg/mL) IV syring, Every 15 min PRN, Intravenous  metoprolol tartrate (LOPRESSOR) tablet 50 mg, 2 times daily, Oral    Plan  - BP is controlled, no changes needed to their regimen

## 2024-12-26 NOTE — PROGRESS NOTES
Ramin Doyle - Neurosurgery ([facility])  Vascular Neurology  Comprehensive Stroke Center  Progress Note    Assessment/Plan:     * Stroke due to occlusion of right posterior cerebral artery  Ms. Willa Kim is a 89 y.o. patient transferred from OSH for right PCA (P1) occlusion monitoring. History of afib on Xarelto, CHF (EF 60%), HTN, DM. Presented with LSW. No baseline NIHSS documented from telestroke. CTH with subacute right MCA infarct; no follow up CTA done. MRI brain with right medial temporo-occipital territory infarct (lateral thalamus) with proximal P1 segment PCA occlusion. OOW for TNK, not IR candidate. NIHSS 9 with LSW and not fully crossing midline. Daughter noticed some dysarthria this AM. Suspected embolic/cardio-embolic etiology. Given some evidence of cerebral atrophy, risk of malignant cerebral edema is low but patient should be closely monitored.    Antithrombotics for secondary stroke prevention:  Xarelto 15 mg po daily with dinner    Statins for secondary stroke prevention and hyperlipidemia, if present:   Statins: Atorvastatin- 40 mg daily    Aggressive risk factor modification: HTN, A-Fib     Rehab efforts: The patient has been evaluated by a stroke team provider and the therapy needs have been fully considered based off the presenting complaints and exam findings. The following therapy evaluations are needed: PT evaluate and treat, OT evaluate and treat, SLP evaluate and treat, PM&R evaluate for appropriate placement, when able     Diagnostics ordered/pending: None     VTE prophylaxis: Mechanical prophylaxis: Place SCDs; Xarelto as above    BP parameters: Infarct: No intervention, SBP <220    ARNULFO (acute kidney injury)  ARNULFO is likely due to pre-renal azotemia due to dehydration. Baseline creatinine is  ~1.0 . Most recent creatinine and eGFR are listed below.  Recent Labs     12/25/24  0325 12/25/24  1639 12/26/24  0443   CREATININE 1.3 1.1 1.0   EGFRNORACEVR 39.8* 48.6* 54.5*        Plan  -  ARNULFO is resolved  - Avoid nephrotoxins and renally dose meds for GFR listed above  - Monitor urine output, serial BMP, and adjust therapy as needed    Constipation  Miralax and senna-doc BID    CHF (congestive heart failure)  No CHF noted in TTE here. Hold home Lasix 20 mg po every other day due to ARNULFO    A-fib  Patient with paroxysmal (<7 days) atrial fibrillation which is controlled currently with Beta Blocker. Patient is currently in sinus rhythm.[id number] Score: 5. Patient is anticoagulated with Xarelto 15 mg po daily with dinner    GERD (gastroesophageal reflux disease)  Continue home protonix     Essential hypertension  Patients blood pressure range in the last 24 hours was: BP  Min: 101/58  Max: 162/69.The patient's inpatient anti-hypertensive regimen is listed below:  Current Antihypertensives  labetalol 20 mg/4 mL (5 mg/mL) IV syring, Every 15 min PRN, Intravenous  metoprolol tartrate (LOPRESSOR) tablet 50 mg, 2 times daily, Oral    Plan  - BP is controlled, no changes needed to their regimen    Urge incontinence  Resumed home oxybutinin   On purewick     Diabetes mellitus, type II  Patient's FSGs are uncontrolled due to hyperglycemia on current medication regimen.  Last A1c reviewed-   Lab Results   Component Value Date    HGBA1C 7.6 (H) 12/20/2024     Most recent fingerstick glucose reviewed-   Recent Labs   Lab 12/25/24  1552 12/25/24  1941 12/26/24  0737 12/26/24  1127   POCTGLUCOSE 227* 198* 198* 207*     Current correctional scale  Low  Increase anti-hyperglycemic dose as follows-   Antihyperglycemics (From admission, onward)      Start     Stop Route Frequency Ordered    12/27/24 0900  insulin glargine U-100 (Lantus) pen 12 Units         -- SubQ Daily 12/26/24 1356    12/24/24 1645  insulin aspart U-100 pen 1 Units         -- SubQ 3 times daily with meals 12/24/24 1424    12/19/24 2307  insulin aspart U-100 pen 0-5 Units         -- SubQ Before meals & nightly PRN 12/19/24 2207          Hold Oral  hypoglycemics while patient is in the hospital.         12/21/2024 - NAEO, VSS. F/u CTH w/ stable, delineated recent infarct in R. PCA territory. Holding rivaroxaban for the time being. Transaminitis being worked up via liver U/S. Blood glucose above the 140-180 target; added basal glargine.  12/22/2024 - NAEO, VSS. F/u CTH w/ stable, delineated recent infarct in R. PCA territory. Holding rivaroxaban for the time being. Transaminitis being worked up via liver U/S - s/f steatosis. Blood glucose trending higher, went up on basal by 2U.  12/23/2024 - NAEON. Working well with PT/OT (she stood up briefly!) Exam improved today. Resuming Xarelto tonight.   12/24/2024 - NAEON. Medically ready, pending SNF. Added bowel regimen.   12/25/2024 - NAEON. New ARNULFO, giving IVF.   12/26/2024 - NAEON. ARNULFO resolved with IVF. Pending SNF.    STROKE DOCUMENTATION        NIH Scale:  1a. Level of Consciousness: 0-->Alert, keenly responsive  1b. LOC Questions: 0-->Answers both questions correctly  1c. LOC Commands: 0-->Performs both tasks correctly  2. Best Gaze: 1-->Partial gaze palsy, gaze is abnormal in one or both eyes, but forced deviation or total gaze paresis is not present  3. Visual: 1-->Partial hemianopia  4. Facial Palsy: 2-->Partial paralysis (total or near-total paralysis of lower face)  5a. Motor Arm, Left: 1-->Drift, limb holds 90 (or 45) degrees, but drifts down before full 10 seconds, does not hit bed or other support  5b. Motor Arm, Right: 0-->No drift, limb holds 90 (or 45) degrees for full 10 secs  6a. Motor Leg, Left: 1-->Drift, leg falls by the end of the 5-sec period but does not hit bed  6b. Motor Leg, Right: 0-->No drift, leg holds 30 degree position for full 5 secs  7. Limb Ataxia: 0-->Absent  8. Sensory: 0-->Normal, no sensory loss  9. Best Language: 0-->No aphasia, normal  10. Dysarthria: 0-->Normal  11. Extinction and Inattention (formerly Neglect): 0-->No abnormality  Total (NIH Stroke Scale): 6       Modified  [place]    Morris Coma Scale:15   ABCD2 Score:    [id number] Score:   HAS -BLED Score:   ICH Score:   Hunt & Wilson Classification:      Hemorrhagic change of an Ischemic Stroke: Does this patient have an ischemic stroke with hemorrhagic changes? No     Neurologic Chief Complaint: R PCA CVA    Subjective:     Interval History: Patient is seen for follow-up neurological assessment and treatment recommendations: See hospital course    HPI, Past Medical, Family, and Social History remains the same as documented in the initial encounter.     Review of Systems   Eyes:  Positive for visual disturbance.   Neurological:  Positive for weakness.     Scheduled Meds:   atorvastatin  40 mg Oral Daily    ciprofloxacin HCl  250 mg Oral Q12H    gabapentin  300 mg Oral TID    insulin aspart U-100  1 Units Subcutaneous TIDWM    insulin glargine U-100  11 Units Subcutaneous Daily    metoprolol tartrate  50 mg Oral BID    oxybutynin  10 mg Oral Daily    pantoprazole  40 mg Oral QAM    polyethylene glycol  17 g Oral BID    rivaroxaban  15 mg Oral with dinner    senna-docusate 8.6-50 mg  1 tablet Oral BID     Continuous Infusions:      PRN Meds:  Current Facility-Administered Medications:     acetaminophen, 650 mg, Oral, Q6H PRN    bisacodyL, 10 mg, Rectal, Daily PRN    dextrose 50%, 12.5 g, Intravenous, PRN    dextrose 50%, 25 g, Intravenous, PRN    glucagon (human recombinant), 1 mg, Intramuscular, PRN    glucose, 16 g, Oral, PRN    glucose, 24 g, Oral, PRN    insulin aspart U-100, 0-5 Units, Subcutaneous, QID (AC + HS) PRN    labetalol, 10 mg, Intravenous, Q15 Min PRN    sodium chloride 0.9%, 10 mL, Intravenous, PRN    Objective:     Vital Signs (Most Recent):  Temp: 98 °F (36.7 °C) (12/26/24 1125)  Pulse: 69 (12/26/24 1125)  Resp: 16 (12/26/24 1125)  BP: 138/60 (12/26/24 1125)  SpO2: 96 % (12/26/24 1125)  BP Location: Right arm    Vital Signs Range (Last 24H):  Temp:  [97.5 °F (36.4 °C)-98.2 °F (36.8 °C)]   Pulse:  [63-74]   Resp:   [16-18]   BP: (114-142)/(57-64)   SpO2:  [93 %-96 %]   BP Location: Right arm       Physical Exam  Vitals and nursing note reviewed.   Constitutional:       Appearance: Normal appearance. She is normal weight.   HENT:      Head: Normocephalic and atraumatic.   Cardiovascular:      Rate and Rhythm: Normal rate and regular rhythm.   Pulmonary:      Effort: Pulmonary effort is normal. No respiratory distress.   Musculoskeletal:      Right lower leg: No edema.      Left lower leg: No edema.   Skin:     General: Skin is warm and dry.   Neurological:      Mental Status: She is alert.        Neurological Exam:   LOC: alert  Attention Span: Good   Language: No aphasia  Articulation: No dysarthria  Orientation: Person, Place, Time   Visual Fields: Hemianopsia left  EOM (CN III, IV, VI): Gaze preference  right    Laboratory:  CMP:   Recent Labs   Lab 12/26/24  0443   CALCIUM 9.1   ALBUMIN 2.7*   PROT 6.1      K 3.8   CO2 24      BUN 27*   CREATININE 1.0   ALKPHOS 286*   *   AST 68*   BILITOT 0.4     CBC:   Recent Labs   Lab 12/26/24  0443   WBC 10.91   RBC 4.04   HGB 12.6   HCT 39.5      MCV 98   MCH 31.2*   MCHC 31.9*     Lipid Panel:   Recent Labs   Lab 12/20/24  0717   CHOL 106*   LDLCALC 56.2*   HDL 37*   TRIG 64     Hgb A1C:   Recent Labs   Lab 12/20/24  0717   HGBA1C 7.6*     TSH:   Recent Labs   Lab 12/21/24  0340   TSH 0.437       Diagnostic Results     Brain Imaging:  CTH with subacute right MCA infarct      MRI brain with right medial temporo-occipital territory infarct (lateral thalamus) with proximal P1 segment PCA occlusion     Cardiac Imaging     Left Ventricle: The left ventricle is normal in size. Ventricular mass is normal. Normal wall thickness. There is concentric remodeling. Normal wall motion. There is normal systolic function with a visually estimated ejection fraction of 60 - 65%. There is indeterminate diastolic function. Elevated left ventricular filling pressure.    Right  Ventricle: Normal right ventricular cavity size. Wall thickness is normal. Systolic function is normal.    Left Atrium: Agitated saline study of the atrial septum is negative after vasalva maneuver, suggesting absence of intracardiac shunt at the atrial level.    Aortic Valve: There is mild annular calcification present.    Mitral Valve: There is moderate mitral annular calcification present.    Pulmonary Artery: Pulmonary artery pressure could not be estimated.    IVC/SVC: Normal venous pressure at 3 mmHg.    Maury Mcfarlane MD  Comprehensive Stroke Center  Department of Vascular Neurology   [facility] Neurosurgery [facility])

## 2024-12-27 PROBLEM — N17.9 AKI (ACUTE KIDNEY INJURY): Status: RESOLVED | Noted: 2024-12-25 | Resolved: 2024-12-27

## 2024-12-27 LAB
ALBUMIN SERPL BCP-MCNC: 2.4 G/DL (ref 3.5–5.2)
ALP SERPL-CCNC: 267 U/L (ref 40–150)
ALT SERPL W/O P-5'-P-CCNC: 86 U/L (ref 10–44)
ANION GAP SERPL CALC-SCNC: 10 MMOL/L (ref 8–16)
AST SERPL-CCNC: 50 U/L (ref 10–40)
BASOPHILS # BLD AUTO: 0.05 K/UL (ref 0–0.2)
BASOPHILS NFR BLD: 0.5 % (ref 0–1.9)
BILIRUB SERPL-MCNC: 0.4 MG/DL (ref 0.1–1)
BUN SERPL-MCNC: 19 MG/DL (ref 8–23)
CALCIUM SERPL-MCNC: 8.6 MG/DL (ref 8.7–10.5)
CHLORIDE SERPL-SCNC: 107 MMOL/L (ref 95–110)
CO2 SERPL-SCNC: 21 MMOL/L (ref 23–29)
CREAT SERPL-MCNC: 0.8 MG/DL (ref 0.5–1.4)
DIFFERENTIAL METHOD BLD: ABNORMAL
EOSINOPHIL # BLD AUTO: 0.3 K/UL (ref 0–0.5)
EOSINOPHIL NFR BLD: 3.1 % (ref 0–8)
ERYTHROCYTE [DISTWIDTH] IN BLOOD BY AUTOMATED COUNT: 11.7 % (ref 11.5–14.5)
EST. GFR  (NO RACE VARIABLE): >60 ML/MIN/1.73 M^2
GLUCOSE SERPL-MCNC: 149 MG/DL (ref 70–110)
HCT VFR BLD AUTO: 35 % (ref 37–48.5)
HGB BLD-MCNC: 12.4 G/DL (ref 12–16)
IMM GRANULOCYTES # BLD AUTO: 0.05 K/UL (ref 0–0.04)
IMM GRANULOCYTES NFR BLD AUTO: 0.5 % (ref 0–0.5)
LYMPHOCYTES # BLD AUTO: 2.9 K/UL (ref 1–4.8)
LYMPHOCYTES NFR BLD: 28 % (ref 18–48)
MAGNESIUM SERPL-MCNC: 1.8 MG/DL (ref 1.6–2.6)
MCH RBC QN AUTO: 32.5 PG (ref 27–31)
MCHC RBC AUTO-ENTMCNC: 35.4 G/DL (ref 32–36)
MCV RBC AUTO: 92 FL (ref 82–98)
MONOCYTES # BLD AUTO: 0.7 K/UL (ref 0.3–1)
MONOCYTES NFR BLD: 6.4 % (ref 4–15)
NEUTROPHILS # BLD AUTO: 6.4 K/UL (ref 1.8–7.7)
NEUTROPHILS NFR BLD: 61.5 % (ref 38–73)
NRBC BLD-RTO: 0 /100 WBC
PHOSPHATE SERPL-MCNC: 2.8 MG/DL (ref 2.7–4.5)
PLATELET # BLD AUTO: 261 K/UL (ref 150–450)
PMV BLD AUTO: 10 FL (ref 9.2–12.9)
POCT GLUCOSE: 151 MG/DL (ref 70–110)
POCT GLUCOSE: 166 MG/DL (ref 70–110)
POCT GLUCOSE: 170 MG/DL (ref 70–110)
POCT GLUCOSE: 182 MG/DL (ref 70–110)
POCT GLUCOSE: 193 MG/DL (ref 70–110)
POCT GLUCOSE: <20 MG/DL (ref 70–110)
POTASSIUM SERPL-SCNC: 3.6 MMOL/L (ref 3.5–5.1)
PROT SERPL-MCNC: 5.5 G/DL (ref 6–8.4)
RBC # BLD AUTO: 3.81 M/UL (ref 4–5.4)
SODIUM SERPL-SCNC: 138 MMOL/L (ref 136–145)
WBC # BLD AUTO: 10.37 K/UL (ref 3.9–12.7)

## 2024-12-27 PROCEDURE — 36415 COLL VENOUS BLD VENIPUNCTURE: CPT

## 2024-12-27 PROCEDURE — 83735 ASSAY OF MAGNESIUM: CPT

## 2024-12-27 PROCEDURE — 11000001 HC ACUTE MED/SURG PRIVATE ROOM

## 2024-12-27 PROCEDURE — 80053 COMPREHEN METABOLIC PANEL: CPT

## 2024-12-27 PROCEDURE — 85025 COMPLETE CBC W/AUTO DIFF WBC: CPT

## 2024-12-27 PROCEDURE — 97535 SELF CARE MNGMENT TRAINING: CPT

## 2024-12-27 PROCEDURE — 92507 TX SP LANG VOICE COMM INDIV: CPT

## 2024-12-27 PROCEDURE — 25000003 PHARM REV CODE 250: Performed by: STUDENT IN AN ORGANIZED HEALTH CARE EDUCATION/TRAINING PROGRAM

## 2024-12-27 PROCEDURE — 99233 SBSQ HOSP IP/OBS HIGH 50: CPT | Mod: GC,,, | Performed by: PSYCHIATRY & NEUROLOGY

## 2024-12-27 PROCEDURE — 25000003 PHARM REV CODE 250

## 2024-12-27 PROCEDURE — 84100 ASSAY OF PHOSPHORUS: CPT

## 2024-12-27 RX ORDER — INSULIN GLARGINE 100 [IU]/ML
11 INJECTION, SOLUTION SUBCUTANEOUS DAILY
Status: DISCONTINUED | OUTPATIENT
Start: 2024-12-27 | End: 2024-12-27

## 2024-12-27 RX ORDER — METOPROLOL SUCCINATE 100 MG/1
100 TABLET, EXTENDED RELEASE ORAL DAILY
Status: DISCONTINUED | OUTPATIENT
Start: 2024-12-27 | End: 2024-12-31 | Stop reason: HOSPADM

## 2024-12-27 RX ORDER — INSULIN GLARGINE 100 [IU]/ML
12 INJECTION, SOLUTION SUBCUTANEOUS DAILY
Status: DISCONTINUED | OUTPATIENT
Start: 2024-12-27 | End: 2024-12-31 | Stop reason: HOSPADM

## 2024-12-27 RX ADMIN — GABAPENTIN 300 MG: 300 CAPSULE ORAL at 08:12

## 2024-12-27 RX ADMIN — ACETAMINOPHEN 650 MG: 325 TABLET ORAL at 09:12

## 2024-12-27 RX ADMIN — METOPROLOL SUCCINATE 100 MG: 100 TABLET, EXTENDED RELEASE ORAL at 08:12

## 2024-12-27 RX ADMIN — SENNOSIDES AND DOCUSATE SODIUM 1 TABLET: 50; 8.6 TABLET ORAL at 08:12

## 2024-12-27 RX ADMIN — ATORVASTATIN CALCIUM 40 MG: 40 TABLET, FILM COATED ORAL at 08:12

## 2024-12-27 RX ADMIN — GABAPENTIN 300 MG: 300 CAPSULE ORAL at 03:12

## 2024-12-27 RX ADMIN — PANTOPRAZOLE SODIUM 40 MG: 20 TABLET, DELAYED RELEASE ORAL at 06:12

## 2024-12-27 RX ADMIN — POTASSIUM BICARBONATE 40 MEQ: 391 TABLET, EFFERVESCENT ORAL at 08:12

## 2024-12-27 RX ADMIN — INSULIN GLARGINE 12 UNITS: 100 INJECTION, SOLUTION SUBCUTANEOUS at 08:12

## 2024-12-27 RX ADMIN — RIVAROXABAN 15 MG: 15 TABLET, FILM COATED ORAL at 05:12

## 2024-12-27 RX ADMIN — POLYETHYLENE GLYCOL 3350 17 G: 17 POWDER, FOR SOLUTION ORAL at 08:12

## 2024-12-27 RX ADMIN — OXYBUTYNIN CHLORIDE 10 MG: 10 TABLET, EXTENDED RELEASE ORAL at 08:12

## 2024-12-27 RX ADMIN — GABAPENTIN 300 MG: 300 CAPSULE ORAL at 09:12

## 2024-12-27 NOTE — PLAN OF CARE
Problem: Adult Inpatient Plan of Care  Goal: Plan of Care Review  Outcome: Progressing  Goal: Patient-Specific Goal (Individualized)  Description: Pt will maintain sbp below 220.  Outcome: Progressing  Goal: Optimal Comfort and Wellbeing  Outcome: Progressing  Goal: Readiness for Transition of Care  Outcome: Progressing     Problem: Diabetes Comorbidity  Goal: Blood Glucose Level Within Targeted Range  Outcome: Progressing     Problem: Wound  Goal: Skin Health and Integrity  Outcome: Progressing  Goal: Optimal Wound Healing  Outcome: Progressing     Problem: Stroke, Ischemic (Includes Transient Ischemic Attack)  Goal: Effective Bowel Elimination  Outcome: Progressing  Goal: Optimal Cerebral Tissue Perfusion  Outcome: Progressing  Goal: Optimal Cognitive Function  Outcome: Progressing  Goal: Improved Communication Skills  Outcome: Progressing  Goal: Optimal Functional Ability  Outcome: Progressing  Goal: Safe and Effective Swallow  Outcome: Progressing  Goal: Effective Urinary Elimination  Outcome: Progressing     Problem: Skin Injury Risk Increased  Goal: Skin Health and Integrity  Outcome: Progressing

## 2024-12-27 NOTE — ASSESSMENT & PLAN NOTE
Patient's FSGs are uncontrolled due to hyperglycemia on current medication regimen.  Last A1c reviewed-   Lab Results   Component Value Date    HGBA1C 7.6 (H) 12/20/2024     Most recent fingerstick glucose reviewed-   Recent Labs   Lab 12/26/24  2205 12/26/24  2205 12/27/24  0750 12/27/24  1248   POCTGLUCOSE 162* 151* 170* 193*     Current correctional scale  Low  Increase anti-hyperglycemic dose as follows-   Antihyperglycemics (From admission, onward)      Start     Stop Route Frequency Ordered    12/27/24 0900  insulin glargine U-100 (Lantus) pen 12 Units         -- SubQ Daily 12/27/24 0733    12/19/24 2307  insulin aspart U-100 pen 0-5 Units         -- SubQ Before meals & nightly PRN 12/19/24 2207          Hold Oral hypoglycemics while patient is in the hospital.

## 2024-12-27 NOTE — PT/OT/SLP PROGRESS
Speech Language Pathology Treatment    Patient Name:  Willa Kim   MRN:  [id number]  Admitting Diagnosis: Stroke due to occlusion of right posterior cerebral artery    Recommendations:                 General Recommendations:  Cognitive-linguistic therapy  Diet recommendations:  Regular Diet - IDDSI Level 7, Liquid Diet Level: Thin liquids - IDDSI Level 0   Aspiration Precautions: Assistance with meals, Feed only when awake/alert, Frequent oral care, HOB to 90 degrees, Meds whole 1 at a time, Small bites/sips, and Standard aspiration precautions   General Precautions: Standard, aspiration, fall  Communication strategies:  provide increased time to answer    Assessment:     Willa Kim is a 89 y.o. female with an SLP diagnosis of Cognitive-Linguistic Impairment.  She presents with L sided neglect.    Subjective     Pt awake and alert with daughter present at bedside upon arrival. Pt agreeable to participate in session.     Pain/Comfort:  Pain Rating 1: 0/10    Respiratory Status: Room air    Objective:     Has the patient been evaluated by SLP for swallowing?   Yes  Keep patient NPO? No   Current Respiratory Status:        Pt seen for ongoing speech therapy. Pt awake and alert. Daughter present at bedside. Pt oriented to self only and required cues to obtain date and place. Pt with L sided neglect. Pt required mod-max cues to attend to and identify objects placed on L side. SLP discussed strategies for L sided neglect. Pt recalled these at end of session. Pt recalling recent events/information with 75% accuracy given increased processing time. Pt answering open-ended questions related to self with 100% accuracy. Pt's daughter reports pt is not yet to pt's baseline level. Pt would benefit from continued therapy. SLP provided education regarding role of SLP, L neglect strategies, recommendations, post acute speech therapy, and SLP POC. Pt's daughter expressed understanding. SLP will continue to follow.      Goals:   Multidisciplinary Problems       SLP Goals          Problem: SLP    Goal Priority Disciplines Outcome   SLP Goal     SLP Progressing   Description: Speech Language Pathology Goals  Goals expected to be met by 1/3    1. Pt will tolerate least restrictive and safest diet to maintain hydration and nutritional needs without signs of airway compromise.  2. Pt will orient x4  3. Pt will answer complex y/n questions with 80% accuracy  4. Pt will complete functional naming tasks with 80% accuracy  5. Pt will participate in ongoing assessment of reading, writing, and visual-spatial skills.                               Plan:     Patient to be seen:  3 x/week   Plan of Care expires:  01/17/25  Plan of Care reviewed with:  patient, daughter   SLP Follow-Up:  Yes       Discharge recommendations:  Moderate Intensity Therapy     Time Tracking:     SLP Treatment Date:   12/27/24  Speech Start Time:  1120  Speech Stop Time:  1137     Speech Total Time (min):  17 min    Billable Minutes: Speech Therapy Individual 9 and Self Care/Home Management Training 8    12/27/2024

## 2024-12-27 NOTE — PLAN OF CARE
Recommendations    1) Continue diabetic diet     2) if concerns for swallowing consider IDDSI level 7 based on SLP recommendations    3) RD monitor wt, nutritional status, skin, and labs,     Goals: meet % of EEN/EPN by next RD f/u    Nutrition Goal Status: new

## 2024-12-27 NOTE — PROGRESS NOTES
Ramin Doyle - Neurosurgery ([facility])  Adult Nutrition  Progress Note    SUMMARY       Recommendations    1) Continue diabetic diet     2) if concerns for swallowing consider IDDSI level 7 based on SLP recommendations    3) RD monitor wt, nutritional status, skin, and labs,     Goals: meet % of EEN/EPN by next RD f/u    Nutrition Goal Status: new  Communication of RD Recs: other (comment) (POC)    Assessment and Plan    Nutrition Problem  Unintentional weight loss    Related to (etiology):   Decreased appetite before admit secondary to constipation     Signs and Symptoms (as evidenced by):   14% of wt loss in 1.5 months     Interventions/Recommendations (treatment strategy):  Collaboration of nutritional care with other providers       Nutrition Diagnosis Status:   New         Reason for Assessment    Reason For Assessment: length of stay  Diagnosis: stroke/CVA    General Information Comments: Willa Vigil is a 89 y.o. patient transferred from OSH for right PCA (P1) occlusion monitoring. History of afib on Xarelto, CHF (EF 60%), HTN, DM. Presented with LSW.     RD triggered for LOS day 8. Spoke to daughter and nurse at bedside. Nurse reported her intake yesterday was good; eating 100% of meals. Today pt did not want breakfast due to upset stomach from a laxitative. Pt had passed 2 large BMs. Pt turned away lunch as well. Daughter suggested foods she would enjoy, such as chicken strips. Per SLP notes, recommended pt switch to IDDSI level 7 diet. Pt has history of GERD. Pt lost 28lbs over the last 1.5 months. Does not meet malnutrition criteria at this time.     Nutrition Discharge Planning: adquate oral intake    Nutrition Related Social Determinants of Health: SDOH: None Identified      Nutrition Risk Screen     Unintentional weight loss    Nutrition/Diet History    Patient Reported Diet/Restrictions/Preferences: diabetic diet  Spiritual, Cultural Beliefs, [employer] Practices, Values that Affect Care: no  Food  "Allergies: NKFA    Anthropometrics    Temp: 97.6 °F (36.4 °C)  Height: 5' 5" (165.1 cm)  Height (inches): 65 in  Weight Method: Bed Scale  Weight: 76.2 kg (168 lb)  Weight (lb): 168 lb  Ideal Body Weight (IBW), Female: 125 lb  % Ideal Body Weight, Female (lb): 134.4 %  BMI (Calculated): 28  Weight Loss: unintentional       Lab/Procedures/Meds    Pertinent Labs Reviewed: reviewed     Labs:   Recent Labs   Lab 12/27/24  0330      K 3.6      CO2 21*   BUN 19   CREATININE 0.8   *   CALCIUM 8.6*   PHOS 2.8   MG 1.8       Pertinent Medications Reviewed: reviewed  Scheduled Meds:   atorvastatin  40 mg Oral Daily    gabapentin  300 mg Oral TID    insulin glargine U-100  12 Units Subcutaneous Daily    metoprolol succinate  100 mg Oral Daily    oxybutynin  10 mg Oral Daily    pantoprazole  40 mg Oral QAM    polyethylene glycol  17 g Oral BID    rivaroxaban  15 mg Oral with dinner    senna-docusate 8.6-50 mg  1 tablet Oral BID       Estimated/Assessed Needs    Weight Used For Calorie Calculations: 76.2 kg (167 lb 15.9 oz)  Energy Calorie Requirements (kcal): 1905-2286kcal (25-30kcal/kg)  Energy Need Method: Kcal/kg  Protein Requirements: 61-76g (0.8-1.0g/kg)  Weight Used For Protein Calculations: 76.2 kg (167 lb 15.9 oz)  Fluid Requirements (mL): per MD  Estimated Fluid Requirement Method: RDA Method  RDA Method (mL): 1905  CHO Requirement: 238g/day      Nutrition Prescription Ordered    Current Diet Order: Diabetic Diet 2000kcal    Evaluation of Received Nutrient/Fluid Intake    I/O:   Intake/Output Summary (Last 24 hours) at 12/27/2024 1725  Last data filed at 12/27/2024 0800  Gross per 24 hour   Intake --   Output 750 ml   Net -750 ml       Energy Calories Required: meeting needs  Protein Required: meeting needs  Fluid Required: meeting needs  Tolerance: tolerating  % Intake of Estimated Energy Needs: 50 - 75 %  % Meal Intake: 50 - 75 %    Nutrition Risk    Level of Risk/Frequency of Follow-up: low - " moderate     Monitor and Evaluation    Food and Nutrient Intake: energy intake  Food and Nutrient Adminstration: diet order  Knowledge/Beliefs/Attitudes: food and nutrition knowledge/skill  Anthropometric Measurements: weight, weight change, body mass index  Biochemical Data, Medical Tests and Procedures: electrolyte and renal panel, inflammatory profile, gastrointestinal profile, lipid profile, glucose/endocrine profile  Nutrition-Focused Physical Findings: overall appearance     Nutrition Follow-Up    RD Follow-up?: Yes    Celia Green, Registration Eligible, Provisional LDN , [place]

## 2024-12-27 NOTE — ASSESSMENT & PLAN NOTE
Patient with paroxysmal (<7 days) atrial fibrillation which is controlled currently with Beta Blocker. Patient is currently in sinus rhythm.[id number] Score: 5. Patient is anticoagulated with Xarelto 15 mg po daily with dinner

## 2024-12-27 NOTE — ASSESSMENT & PLAN NOTE
Patients blood pressure range in the last 24 hours was: BP  Min: 101/58  Max: 162/69.The patient's inpatient anti-hypertensive regimen is listed below:  Current Antihypertensives  labetalol 20 mg/4 mL (5 mg/mL) IV syring, Every 15 min PRN, Intravenous  metoprolol succinate (TOPROL-XL) 24 hr tablet 100 mg, Daily, Oral    Plan  - BP is controlled, no changes needed to their regimen

## 2024-12-27 NOTE — CONSULTS
Ramin Doyle - Neurosurgery ([facility])  Wound Care    Patient Name:  Willa Kim   MRN:  [id number]  Date: 12/27/2024  Diagnosis: Stroke due to occlusion of right posterior cerebral artery    History:     Past Medical History:   Diagnosis Date    A-fib     Anticoagulant long-term use     Arthritis     ASCVD (arteriosclerotic cardiovascular disease)     Breast cancer @ 35y/o    Left breast    Bronchitis     CKD (chronic kidney disease)     Coronary artery disease     Diabetes mellitus     -150    Diverticulosis     Elevated cholesterol     Encounter for blood transfusion     GERD (gastroesophageal reflux disease)     High cholesterol     History of mammogram 10/2/12    Normal    History of small bowel obstruction     Hypertension     Internal hemorrhoids     Mitral valve disorder     YANIQUE (obstructive sleep apnea)     NO C PAP USE    Osteoarthritis     Osteoporosis, unspecified     PSVT (paroxysmal supraventricular tachycardia)     SBO (small bowel obstruction)     UTI (urinary tract infection)     Wears dentures        Social History     Socioeconomic History    Marital status:    Tobacco Use    Smoking status: Never    Smokeless tobacco: Never   Substance and Sexual Activity    Alcohol use: No    Drug use: No    Sexual activity: Not Currently     Birth control/protection: Surgical, Post-menopausal     Comment:      Social Drivers of Health     Financial Resource Strain: Medium Risk (12/25/2024)    Overall Financial Resource Strain (CARDIA)     Difficulty of Paying Living Expenses: Somewhat hard   Food Insecurity: Patient Unable To Answer (12/25/2024)    Hunger Vital Sign     Worried About Running Out of Food in the Last Year: Patient unable to answer     Ran Out of Food in the Last Year: Patient unable to answer   Recent Concern: Food Insecurity - Food Insecurity Present (12/20/2024)    Hunger Vital Sign     Worried About Running Out of Food in the Last Year: Sometimes true     Ran Out of Food in  the Last Year: Never true   Transportation Needs: No Transportation Needs (12/25/2024)    TRANSPORTATION NEEDS     Transportation : No   Physical Activity: Inactive (12/21/2024)    Exercise Vital Sign     Days of Exercise per Week: 0 days     Minutes of Exercise per Session: 0 min   Stress: Stress Concern Present (12/25/2024)    [employer] [place] [employer] - Occupational Stress Questionnaire     Feeling of Stress : Rather much   Housing Stability: Low Risk  (12/25/2024)    Housing Stability Vital Sign     Unable to Pay for Housing in the Last Year: No     Homeless in the Last Year: No       Precautions:     Allergies as of 12/18/2024 - Reviewed 11/05/2024   Allergen Reaction Noted    Clindamycin  08/01/2021    Adhesive  06/04/2019    Celestone [betamethasone sodium phosphate]  10/30/2012    Diclofenac sodium  10/14/2019    Keflex [cephalexin] Other (See Comments) 08/17/2015    Meloxicam  10/14/2019    Pcn [penicillins] Swelling 10/30/2012    Percodan [oxycodone hcl-oxycodone-asa]  10/30/2012    Sulfa (sulfonamide antibiotics) Nausea And Vomiting 10/30/2012    Trimethoprim Itching 09/25/2017    Vibramycin [doxycycline calcium]  10/30/2012       [facility] Assessment Details/Treatment     Patient seen for inpatient wound care consult. Primary RN at bedside and agreeable to assessment at this time. Haiku upload photo upload error. Large loose stool incontinence cleaned during assessment. Per primary RN and during assessment, sacrum and buttocks noted to have blanchable erythema indicative of moisture association. No open wounds, active drainage, or indications of pressure injuries noted during assessment.        Reviewed chart for this encounter.  See flowsheet for findings.      Recommendations: Cleanse sacrum and gluteal cleft with sterile normal saline and pat dry. Apply triad BID and PRN if soiled for moisture barrier. No other issues or concerns at this time. Nursing to maintain pressure injury preventions  measures. Will follow up 1/3/2025 or sooner if needed.        Discussed POC with patient and primary nurse.  See EMR for orders and patient education.    Bedside nursing to continue care and monitoring.  Bedside to maintain pressure injury prevention interventions.        12/27/24 0900   WOCN Assessment   WOCN Total Time (mins) 30   Visit Date 12/27/24   Visit Time 0900   Consult Type New   WOCN Speciality Wound   Intervention assessed;changed;applied;chart review;coordination of care;orders   Teaching on-going        Wound 12/19/24 2031 Moisture associated dermatitis Gluteal cleft   Date First Assessed/Time First Assessed: 12/19/24 2031   Present on Original Admission: Yes  Primary Wound Type: Moisture associated dermatitis  Location: Gluteal cleft   Dressing Appearance Open to air;Dry;Intact;Clean   Drainage Amount None   Drainage Characteristics/Odor No odor   Appearance Intact;Pink;Red   Care Cleansed with:;Sterile normal saline;Applied:;Skin Barrier  (Triad)     Orders placed.   Ori ALBERTON, RN, C  12/27/2024

## 2024-12-27 NOTE — ASSESSMENT & PLAN NOTE
ARNULFO is likely due to pre-renal azotemia due to dehydration. Baseline creatinine is  ~1.0 . Most recent creatinine and eGFR are listed below.  Recent Labs     12/25/24  1639 12/26/24  0443 12/27/24  0330   CREATININE 1.1 1.0 0.8   EGFRNORACEVR 48.6* 54.5* >60.0        Plan  - ARNULFO is resolved  - Avoid nephrotoxins and renally dose meds for GFR listed above  - Monitor urine output, serial BMP, and adjust therapy as needed

## 2024-12-27 NOTE — PROGRESS NOTES
Ramin Doyle - Neurosurgery ([facility])  Vascular Neurology  Comprehensive Stroke Center  Progress Note    Assessment/Plan:     * Stroke due to occlusion of right posterior cerebral artery  Ms. Willa Kim is a 89 y.o. patient transferred from OSH for right PCA (P1) occlusion monitoring. History of afib on Xarelto, CHF (EF 60%), HTN, DM. Presented with LSW. No baseline NIHSS documented from telestroke. CTH with subacute right MCA infarct; no follow up CTA done. MRI brain with right medial temporo-occipital territory infarct (lateral thalamus) with proximal P1 segment PCA occlusion. OOW for TNK, not IR candidate. NIHSS 9 with LSW and not fully crossing midline. Daughter noticed some dysarthria this AM. Suspected embolic/cardio-embolic etiology. Given some evidence of cerebral atrophy, risk of malignant cerebral edema is low but patient should be closely monitored.    Antithrombotics for secondary stroke prevention:  Xarelto 15 mg po daily with dinner    Statins for secondary stroke prevention and hyperlipidemia, if present:   Statins: Atorvastatin- 40 mg daily    Aggressive risk factor modification: HTN, A-Fib     Rehab efforts: The patient has been evaluated by a stroke team provider and the therapy needs have been fully considered based off the presenting complaints and exam findings. The following therapy evaluations are needed: PT evaluate and treat, OT evaluate and treat, SLP evaluate and treat, PM&R evaluate for appropriate placement, when able     Diagnostics ordered/pending: None     VTE prophylaxis: Mechanical prophylaxis: Place SCDs; Xarelto as above    BP parameters: Infarct: No intervention, SBP <220    Constipation  Miralax and senna-doc BID    CHF (congestive heart failure)  No CHF noted in TTE here. Hold home Lasix 20 mg po every other day due to ARNULFO    A-fib  Patient with paroxysmal (<7 days) atrial fibrillation which is controlled currently with Beta Blocker. Patient is currently in sinus  rhythm.[id number] Score: 5. Patient is anticoagulated with Xarelto 15 mg po daily with dinner    GERD (gastroesophageal reflux disease)  Continue home protonix     Essential hypertension  Patients blood pressure range in the last 24 hours was: BP  Min: 101/58  Max: 162/69.The patient's inpatient anti-hypertensive regimen is listed below:  Current Antihypertensives  labetalol 20 mg/4 mL (5 mg/mL) IV syring, Every 15 min PRN, Intravenous  metoprolol succinate (TOPROL-XL) 24 hr tablet 100 mg, Daily, Oral    Plan  - BP is controlled, no changes needed to their regimen    Urge incontinence  Resumed home oxybutinin   On purewick     Diabetes mellitus, type II  Patient's FSGs are uncontrolled due to hyperglycemia on current medication regimen.  Last A1c reviewed-   Lab Results   Component Value Date    HGBA1C 7.6 (H) 12/20/2024     Most recent fingerstick glucose reviewed-   Recent Labs   Lab 12/26/24  2205 12/26/24  2205 12/27/24  0750 12/27/24  1248   POCTGLUCOSE 162* 151* 170* 193*     Current correctional scale  Low  Increase anti-hyperglycemic dose as follows-   Antihyperglycemics (From admission, onward)      Start     Stop Route Frequency Ordered    12/27/24 0900  insulin glargine U-100 (Lantus) pen 12 Units         -- SubQ Daily 12/27/24 0733    12/19/24 2307  insulin aspart U-100 pen 0-5 Units         -- SubQ Before meals & nightly PRN 12/19/24 2207          Hold Oral hypoglycemics while patient is in the hospital.         12/21/2024 - ZEYAD CHINOS. F/u CTH w/ stable, delineated recent infarct in R. PCA territory. Holding rivaroxaban for the time being. Transaminitis being worked up via liver U/S. Blood glucose above the 140-180 target; added basal glargine.  12/22/2024 - MATTI VSS. F/u CTH w/ stable, delineated recent infarct in R. PCA territory. Holding rivaroxaban for the time being. Transaminitis being worked up via liver U/S - s/f steatosis. Blood glucose trending higher, went up on basal by 2U.  12/23/2024 -  RAFFY. Working well with PT/OT (she stood up briefly!) Exam improved today. Resuming Xarelto tonight.   12/24/2024 - NAEON. Medically ready, pending SNF. Added bowel regimen.   12/25/2024 - NAEON. New ARNULFO, giving IVF.   12/26/2024 - NAEON. ARNULFO resolved with IVF. Pending SNF.  12/27/2024 - NAEON. Able to cross midline today. Pending SNF.     STROKE DOCUMENTATION        NIH Scale:  1a. Level of Consciousness: 0-->Alert, keenly responsive  1b. LOC Questions: 0-->Answers both questions correctly  1c. LOC Commands: 0-->Performs both tasks correctly  2. Best Gaze: 0-->Normal (able to cross midline today)  3. Visual: 1-->Partial hemianopia  4. Facial Palsy: 1-->Minor paralysis (flattened nasolabial fold, asymmetry on smiling)  5a. Motor Arm, Left: 1-->Drift, limb holds 90 (or 45) degrees, but drifts down before full 10 seconds, does not hit bed or other support  5b. Motor Arm, Right: 0-->No drift, limb holds 90 (or 45) degrees for full 10 secs  6a. Motor Leg, Left: 2-->Some effort against gravity, leg falls to bed by 5 secs, but has some effort against gravity  6b. Motor Leg, Right: 0-->No drift, leg holds 30 degree position for full 5 secs  7. Limb Ataxia: 0-->Absent  8. Sensory: 0-->Normal, no sensory loss  9. Best Language: 0-->No aphasia, normal  10. Dysarthria: 0-->Normal  11. Extinction and Inattention (formerly Neglect): 0-->No abnormality  Total (NIH Stroke Scale): 5       Modified [place] Coma Scale:15   ABCD2 Score:    [id number] Score:   HAS -BLED Score:   ICH Score:   Hunt & Wilson Classification:      Hemorrhagic change of an Ischemic Stroke: Does this patient have an ischemic stroke with hemorrhagic changes? No     Neurologic Chief Complaint: R PCA CVA    Subjective:     Interval History: Patient is seen for follow-up neurological assessment and treatment recommendations: See hospital course    HPI, Past Medical, Family, and Social History remains the same as documented in the initial encounter.      Review of Systems   Eyes:  Positive for visual disturbance.   Neurological:  Positive for weakness.     Scheduled Meds:   atorvastatin  40 mg Oral Daily    gabapentin  300 mg Oral TID    insulin glargine U-100  12 Units Subcutaneous Daily    metoprolol succinate  100 mg Oral Daily    oxybutynin  10 mg Oral Daily    pantoprazole  40 mg Oral QAM    polyethylene glycol  17 g Oral BID    rivaroxaban  15 mg Oral with dinner    senna-docusate 8.6-50 mg  1 tablet Oral BID     Continuous Infusions:      PRN Meds:  Current Facility-Administered Medications:     acetaminophen, 650 mg, Oral, Q6H PRN    bisacodyL, 10 mg, Rectal, Daily PRN    dextrose 50%, 12.5 g, Intravenous, PRN    dextrose 50%, 25 g, Intravenous, PRN    glucagon (human recombinant), 1 mg, Intramuscular, PRN    glucose, 16 g, Oral, PRN    glucose, 24 g, Oral, PRN    insulin aspart U-100, 0-5 Units, Subcutaneous, QID (AC + HS) PRN    labetalol, 10 mg, Intravenous, Q15 Min PRN    sodium chloride 0.9%, 10 mL, Intravenous, PRN    Objective:     Vital Signs (Most Recent):  Temp: 97.6 °F (36.4 °C) (12/27/24 1245)  Pulse: 69 (12/27/24 1245)  Resp: 20 (12/27/24 1245)  BP: (!) 148/66 (12/27/24 1245)  SpO2: (!) 94 % (12/27/24 1245)  BP Location: Left arm    Vital Signs Range (Last 24H):  Temp:  [97.6 °F (36.4 °C)-98.2 °F (36.8 °C)]   Pulse:  [58-79]   Resp:  [18-24]   BP: (126-155)/(59-84)   SpO2:  [94 %-98 %]   BP Location: Left arm       Physical Exam  Vitals and nursing note reviewed.   Constitutional:       Appearance: Normal appearance. She is normal weight.   HENT:      Head: Normocephalic and atraumatic.   Cardiovascular:      Rate and Rhythm: Normal rate and regular rhythm.   Pulmonary:      Effort: Pulmonary effort is normal. No respiratory distress.   Musculoskeletal:      Right lower leg: No edema.      Left lower leg: No edema.   Skin:     General: Skin is warm and dry.   Neurological:      Mental Status: She is alert.        Neurological Exam:   LOC:  alert  Attention Span: Good   Language: No aphasia  Articulation: No dysarthria  Orientation: Person, Place, Time   Visual Fields: Hemianopsia left  EOM (CN III, IV, VI): Crossed midline today    Laboratory:  CMP:   Recent Labs   Lab 12/27/24  0330   CALCIUM 8.6*   ALBUMIN 2.4*   PROT 5.5*      K 3.6   CO2 21*      BUN 19   CREATININE 0.8   ALKPHOS 267*   ALT 86*   AST 50*   BILITOT 0.4     CBC:   Recent Labs   Lab 12/27/24  0330   WBC 10.37   RBC 3.81*   HGB 12.4   HCT 35.0*      MCV 92   MCH 32.5*   MCHC 35.4       Diagnostic Results     Brain Imaging:  CTH with subacute right MCA infarct      MRI brain with right medial temporo-occipital territory infarct (lateral thalamus) with proximal P1 segment PCA occlusion     Cardiac Imaging     Left Ventricle: The left ventricle is normal in size. Ventricular mass is normal. Normal wall thickness. There is concentric remodeling. Normal wall motion. There is normal systolic function with a visually estimated ejection fraction of 60 - 65%. There is indeterminate diastolic function. Elevated left ventricular filling pressure.    Right Ventricle: Normal right ventricular cavity size. Wall thickness is normal. Systolic function is normal.    Left Atrium: Agitated saline study of the atrial septum is negative after vasalva maneuver, suggesting absence of intracardiac shunt at the atrial level.    Aortic Valve: There is mild annular calcification present.    Mitral Valve: There is moderate mitral annular calcification present.    Pulmonary Artery: Pulmonary artery pressure could not be estimated.    IVC/SVC: Normal venous pressure at 3 mmHg.    Maury Mcfarlane MD  Comprehensive Stroke Center  Department of Vascular Neurology   [facility])

## 2024-12-27 NOTE — PLAN OF CARE
Ramin Doyle - Neurosurgery (Hospital)  Discharge Reassessment    Primary Care Provider: Rajiv Conte PA-C    Expected Discharge Date: 12/27/2024    Reassessment (most recent)       Discharge Reassessment - 12/27/24 1117          Discharge Reassessment    Assessment Type Discharge Planning Reassessment (P)      Did the patient's condition or plan change since previous assessment? No (P)      Discharge Plan discussed with: Adult children (P)      Communicated BRAYDON with patient/caregiver Yes (P)      Discharge Plan A Skilled Nursing Facility (P)      DME Needed Upon Discharge  none (P)      Transition of Care Barriers None (P)         Post-Acute Status    Post-Acute Authorization Placement (P)      Post-Acute Placement Status Pending payor review/awaiting authorization (if required) (P)      Discharge Delays Payor Issues (P)                    Patient is medically cleared for SNF placement.  Patient has been accepted at [facility] their swing bed.  SW spoke with LUIS Otoole at [phone] and she stated that admit is pending insurance auth.  If not obtained early today, patient will admit on Monday, as they don't take weekend admits due to not having therapy.      Discharge Plan A and Plan B have been determined by review of patient's clinical status, future medical and therapeutic needs, and coverage/benefits for post-acute care in coordination with multidisciplinary team members.    Tamara Schwartz, MARIA DEL ROSARIO  [facility]  [phone]

## 2024-12-27 NOTE — SUBJECTIVE & OBJECTIVE
Neurologic Chief Complaint: R PCA CVA    Subjective:     Interval History: Patient is seen for follow-up neurological assessment and treatment recommendations: See hospital course    HPI, Past Medical, Family, and Social History remains the same as documented in the initial encounter.     Review of Systems   Eyes:  Positive for visual disturbance.   Neurological:  Positive for weakness.     Scheduled Meds:   atorvastatin  40 mg Oral Daily    gabapentin  300 mg Oral TID    insulin glargine U-100  12 Units Subcutaneous Daily    metoprolol succinate  100 mg Oral Daily    oxybutynin  10 mg Oral Daily    pantoprazole  40 mg Oral QAM    polyethylene glycol  17 g Oral BID    rivaroxaban  15 mg Oral with dinner    senna-docusate 8.6-50 mg  1 tablet Oral BID     Continuous Infusions:      PRN Meds:  Current Facility-Administered Medications:     acetaminophen, 650 mg, Oral, Q6H PRN    bisacodyL, 10 mg, Rectal, Daily PRN    dextrose 50%, 12.5 g, Intravenous, PRN    dextrose 50%, 25 g, Intravenous, PRN    glucagon (human recombinant), 1 mg, Intramuscular, PRN    glucose, 16 g, Oral, PRN    glucose, 24 g, Oral, PRN    insulin aspart U-100, 0-5 Units, Subcutaneous, QID (AC + HS) PRN    labetalol, 10 mg, Intravenous, Q15 Min PRN    sodium chloride 0.9%, 10 mL, Intravenous, PRN    Objective:     Vital Signs (Most Recent):  Temp: 97.6 °F (36.4 °C) (12/27/24 1245)  Pulse: 69 (12/27/24 1245)  Resp: 20 (12/27/24 1245)  BP: (!) 148/66 (12/27/24 1245)  SpO2: (!) 94 % (12/27/24 1245)  BP Location: Left arm    Vital Signs Range (Last 24H):  Temp:  [97.6 °F (36.4 °C)-98.2 °F (36.8 °C)]   Pulse:  [58-79]   Resp:  [18-24]   BP: (126-155)/(59-84)   SpO2:  [94 %-98 %]   BP Location: Left arm       Physical Exam  Vitals and nursing note reviewed.   Constitutional:       Appearance: Normal appearance. She is normal weight.   HENT:      Head: Normocephalic and atraumatic.   Cardiovascular:      Rate and Rhythm: Normal rate and regular rhythm.    Pulmonary:      Effort: Pulmonary effort is normal. No respiratory distress.   Musculoskeletal:      Right lower leg: No edema.      Left lower leg: No edema.   Skin:     General: Skin is warm and dry.   Neurological:      Mental Status: She is alert.        Neurological Exam:   LOC: alert  Attention Span: Good   Language: No aphasia  Articulation: No dysarthria  Orientation: Person, Place, Time   Visual Fields: Hemianopsia left  EOM (CN III, IV, VI): Crossed midline today    Laboratory:  CMP:   Recent Labs   Lab 12/27/24  0330   CALCIUM 8.6*   ALBUMIN 2.4*   PROT 5.5*      K 3.6   CO2 21*      BUN 19   CREATININE 0.8   ALKPHOS 267*   ALT 86*   AST 50*   BILITOT 0.4     CBC:   Recent Labs   Lab 12/27/24  0330   WBC 10.37   RBC 3.81*   HGB 12.4   HCT 35.0*      MCV 92   MCH 32.5*   MCHC 35.4       Diagnostic Results     Brain Imaging:  CTH with subacute right MCA infarct      MRI brain with right medial temporo-occipital territory infarct (lateral thalamus) with proximal P1 segment PCA occlusion     Cardiac Imaging     Left Ventricle: The left ventricle is normal in size. Ventricular mass is normal. Normal wall thickness. There is concentric remodeling. Normal wall motion. There is normal systolic function with a visually estimated ejection fraction of 60 - 65%. There is indeterminate diastolic function. Elevated left ventricular filling pressure.    Right Ventricle: Normal right ventricular cavity size. Wall thickness is normal. Systolic function is normal.    Left Atrium: Agitated saline study of the atrial septum is negative after vasalva maneuver, suggesting absence of intracardiac shunt at the atrial level.    Aortic Valve: There is mild annular calcification present.    Mitral Valve: There is moderate mitral annular calcification present.    Pulmonary Artery: Pulmonary artery pressure could not be estimated.    IVC/SVC: Normal venous pressure at 3 mmHg.

## 2024-12-27 NOTE — PLAN OF CARE
Problem: Adult Inpatient Plan of Care  Goal: Plan of Care Review  Outcome: Progressing  Goal: Patient-Specific Goal (Individualized)  Description: Pt will maintain sbp below 220.  Outcome: Progressing  Goal: Absence of Hospital-Acquired Illness or Injury  Outcome: Progressing  Goal: Optimal Comfort and Wellbeing  Outcome: Progressing  Goal: Readiness for Transition of Care  Outcome: Progressing

## 2024-12-28 LAB
ALBUMIN SERPL BCP-MCNC: 2.6 G/DL (ref 3.5–5.2)
ALP SERPL-CCNC: 281 U/L (ref 40–150)
ALT SERPL W/O P-5'-P-CCNC: 77 U/L (ref 10–44)
ANION GAP SERPL CALC-SCNC: 10 MMOL/L (ref 8–16)
AST SERPL-CCNC: 46 U/L (ref 10–40)
BASOPHILS # BLD AUTO: 0.05 K/UL (ref 0–0.2)
BASOPHILS NFR BLD: 0.7 % (ref 0–1.9)
BILIRUB SERPL-MCNC: 0.5 MG/DL (ref 0.1–1)
BUN SERPL-MCNC: 17 MG/DL (ref 8–23)
CALCIUM SERPL-MCNC: 9.2 MG/DL (ref 8.7–10.5)
CHLORIDE SERPL-SCNC: 105 MMOL/L (ref 95–110)
CO2 SERPL-SCNC: 23 MMOL/L (ref 23–29)
CREAT SERPL-MCNC: 0.9 MG/DL (ref 0.5–1.4)
DIFFERENTIAL METHOD BLD: ABNORMAL
EOSINOPHIL # BLD AUTO: 0.3 K/UL (ref 0–0.5)
EOSINOPHIL NFR BLD: 3.7 % (ref 0–8)
ERYTHROCYTE [DISTWIDTH] IN BLOOD BY AUTOMATED COUNT: 11.9 % (ref 11.5–14.5)
EST. GFR  (NO RACE VARIABLE): >60 ML/MIN/1.73 M^2
GLUCOSE SERPL-MCNC: 142 MG/DL (ref 70–110)
HCT VFR BLD AUTO: 39.9 % (ref 37–48.5)
HGB BLD-MCNC: 13.3 G/DL (ref 12–16)
IMM GRANULOCYTES # BLD AUTO: 0.02 K/UL (ref 0–0.04)
IMM GRANULOCYTES NFR BLD AUTO: 0.3 % (ref 0–0.5)
LYMPHOCYTES # BLD AUTO: 2.5 K/UL (ref 1–4.8)
LYMPHOCYTES NFR BLD: 33.7 % (ref 18–48)
MAGNESIUM SERPL-MCNC: 1.9 MG/DL (ref 1.6–2.6)
MCH RBC QN AUTO: 31.7 PG (ref 27–31)
MCHC RBC AUTO-ENTMCNC: 33.3 G/DL (ref 32–36)
MCV RBC AUTO: 95 FL (ref 82–98)
MONOCYTES # BLD AUTO: 0.4 K/UL (ref 0.3–1)
MONOCYTES NFR BLD: 6 % (ref 4–15)
NEUTROPHILS # BLD AUTO: 4.1 K/UL (ref 1.8–7.7)
NEUTROPHILS NFR BLD: 55.6 % (ref 38–73)
NRBC BLD-RTO: 0 /100 WBC
PHOSPHATE SERPL-MCNC: 3.3 MG/DL (ref 2.7–4.5)
PLATELET # BLD AUTO: 245 K/UL (ref 150–450)
PMV BLD AUTO: 10.6 FL (ref 9.2–12.9)
POCT GLUCOSE: 129 MG/DL (ref 70–110)
POCT GLUCOSE: 149 MG/DL (ref 70–110)
POCT GLUCOSE: 174 MG/DL (ref 70–110)
POCT GLUCOSE: 213 MG/DL (ref 70–110)
POTASSIUM SERPL-SCNC: 4.3 MMOL/L (ref 3.5–5.1)
PROT SERPL-MCNC: 6.2 G/DL (ref 6–8.4)
RBC # BLD AUTO: 4.19 M/UL (ref 4–5.4)
SODIUM SERPL-SCNC: 138 MMOL/L (ref 136–145)
WBC # BLD AUTO: 7.31 K/UL (ref 3.9–12.7)

## 2024-12-28 PROCEDURE — 80053 COMPREHEN METABOLIC PANEL: CPT

## 2024-12-28 PROCEDURE — 84100 ASSAY OF PHOSPHORUS: CPT

## 2024-12-28 PROCEDURE — 11000001 HC ACUTE MED/SURG PRIVATE ROOM

## 2024-12-28 PROCEDURE — 25000003 PHARM REV CODE 250

## 2024-12-28 PROCEDURE — 83735 ASSAY OF MAGNESIUM: CPT

## 2024-12-28 PROCEDURE — 85025 COMPLETE CBC W/AUTO DIFF WBC: CPT

## 2024-12-28 PROCEDURE — 36415 COLL VENOUS BLD VENIPUNCTURE: CPT

## 2024-12-28 PROCEDURE — 99233 SBSQ HOSP IP/OBS HIGH 50: CPT | Mod: GC,,, | Performed by: PSYCHIATRY & NEUROLOGY

## 2024-12-28 RX ORDER — FUROSEMIDE 20 MG/1
20 TABLET ORAL ONCE
Status: COMPLETED | OUTPATIENT
Start: 2024-12-28 | End: 2024-12-28

## 2024-12-28 RX ADMIN — GABAPENTIN 300 MG: 300 CAPSULE ORAL at 08:12

## 2024-12-28 RX ADMIN — SENNOSIDES AND DOCUSATE SODIUM 1 TABLET: 50; 8.6 TABLET ORAL at 09:12

## 2024-12-28 RX ADMIN — SENNOSIDES AND DOCUSATE SODIUM 1 TABLET: 50; 8.6 TABLET ORAL at 08:12

## 2024-12-28 RX ADMIN — FUROSEMIDE 20 MG: 20 TABLET ORAL at 08:12

## 2024-12-28 RX ADMIN — POLYETHYLENE GLYCOL 3350 17 G: 17 POWDER, FOR SOLUTION ORAL at 08:12

## 2024-12-28 RX ADMIN — METOPROLOL SUCCINATE 100 MG: 100 TABLET, EXTENDED RELEASE ORAL at 08:12

## 2024-12-28 RX ADMIN — GABAPENTIN 300 MG: 300 CAPSULE ORAL at 02:12

## 2024-12-28 RX ADMIN — INSULIN ASPART 2 UNITS: 100 INJECTION, SOLUTION INTRAVENOUS; SUBCUTANEOUS at 12:12

## 2024-12-28 RX ADMIN — PANTOPRAZOLE SODIUM 40 MG: 20 TABLET, DELAYED RELEASE ORAL at 05:12

## 2024-12-28 RX ADMIN — OXYBUTYNIN CHLORIDE 10 MG: 10 TABLET, EXTENDED RELEASE ORAL at 08:12

## 2024-12-28 RX ADMIN — POLYETHYLENE GLYCOL 3350 17 G: 17 POWDER, FOR SOLUTION ORAL at 09:12

## 2024-12-28 RX ADMIN — RIVAROXABAN 15 MG: 15 TABLET, FILM COATED ORAL at 05:12

## 2024-12-28 RX ADMIN — INSULIN GLARGINE 12 UNITS: 100 INJECTION, SOLUTION SUBCUTANEOUS at 08:12

## 2024-12-28 RX ADMIN — GABAPENTIN 300 MG: 300 CAPSULE ORAL at 09:12

## 2024-12-28 RX ADMIN — ATORVASTATIN CALCIUM 40 MG: 40 TABLET, FILM COATED ORAL at 08:12

## 2024-12-28 NOTE — SUBJECTIVE & OBJECTIVE
Neurologic Chief Complaint: R PCA CVA    Subjective:     Interval History: Patient is seen for follow-up neurological assessment and treatment recommendations: See hospital course    HPI, Past Medical, Family, and Social History remains the same as documented in the initial encounter.     Review of Systems   Eyes:  Positive for visual disturbance.   Neurological:  Positive for weakness.     Scheduled Meds:   atorvastatin  40 mg Oral Daily    gabapentin  300 mg Oral TID    insulin glargine U-100  12 Units Subcutaneous Daily    metoprolol succinate  100 mg Oral Daily    oxybutynin  10 mg Oral Daily    pantoprazole  40 mg Oral QAM    polyethylene glycol  17 g Oral BID    rivaroxaban  15 mg Oral with dinner    senna-docusate 8.6-50 mg  1 tablet Oral BID     Continuous Infusions:      PRN Meds:  Current Facility-Administered Medications:     acetaminophen, 650 mg, Oral, Q6H PRN    bisacodyL, 10 mg, Rectal, Daily PRN    dextrose 50%, 12.5 g, Intravenous, PRN    dextrose 50%, 25 g, Intravenous, PRN    glucagon (human recombinant), 1 mg, Intramuscular, PRN    glucose, 16 g, Oral, PRN    glucose, 24 g, Oral, PRN    insulin aspart U-100, 0-5 Units, Subcutaneous, QID (AC + HS) PRN    labetalol, 10 mg, Intravenous, Q15 Min PRN    sodium chloride 0.9%, 10 mL, Intravenous, PRN    Objective:     Vital Signs (Most Recent):  Temp: 97.2 °F (36.2 °C) (12/28/24 0810)  Pulse: 72 (12/28/24 0810)  Resp: 18 (12/28/24 0810)  BP: 135/65 (12/28/24 0810)  SpO2: 95 % (12/28/24 0810)  BP Location: Right arm    Vital Signs Range (Last 24H):  Temp:  [97.2 °F (36.2 °C)-98.6 °F (37 °C)]   Pulse:  [64-74]   Resp:  [18-20]   BP: (118-154)/(56-70)   SpO2:  [92 %-95 %]   BP Location: Right arm       Physical Exam  Vitals and nursing note reviewed.   Constitutional:       Appearance: Normal appearance. She is normal weight.   HENT:      Head: Normocephalic and atraumatic.   Cardiovascular:      Rate and Rhythm: Normal rate and regular rhythm.    Pulmonary:      Effort: Pulmonary effort is normal. No respiratory distress.   Musculoskeletal:      Right lower leg: No edema.      Left lower leg: No edema.   Skin:     General: Skin is warm and dry.   Neurological:      Mental Status: She is alert.        Neurological Exam:   LOC: alert  Attention Span: Good   Language: No aphasia  Articulation: No dysarthria  Orientation: Person, Place, Time   Visual Fields: Hemianopsia left    Laboratory:  CMP:   Recent Labs   Lab 12/28/24  0631   CALCIUM 9.2   ALBUMIN 2.6*   PROT 6.2      K 4.3   CO2 23      BUN 17   CREATININE 0.9   ALKPHOS 281*   ALT 77*   AST 46*   BILITOT 0.5     CBC:   Recent Labs   Lab 12/28/24  0631   WBC 7.31   RBC 4.19   HGB 13.3   HCT 39.9      MCV 95   MCH 31.7*   MCHC 33.3       Diagnostic Results     Brain Imaging:  CTH with subacute right MCA infarct      MRI brain with right medial temporo-occipital territory infarct (lateral thalamus) with proximal P1 segment PCA occlusion     Cardiac Imaging     Left Ventricle: The left ventricle is normal in size. Ventricular mass is normal. Normal wall thickness. There is concentric remodeling. Normal wall motion. There is normal systolic function with a visually estimated ejection fraction of 60 - 65%. There is indeterminate diastolic function. Elevated left ventricular filling pressure.    Right Ventricle: Normal right ventricular cavity size. Wall thickness is normal. Systolic function is normal.    Left Atrium: Agitated saline study of the atrial septum is negative after vasalva maneuver, suggesting absence of intracardiac shunt at the atrial level.    Aortic Valve: There is mild annular calcification present.    Mitral Valve: There is moderate mitral annular calcification present.    Pulmonary Artery: Pulmonary artery pressure could not be estimated.    IVC/SVC: Normal venous pressure at 3 mmHg.

## 2024-12-28 NOTE — PLAN OF CARE
Problem: Diabetes Comorbidity  Goal: Blood Glucose Level Within Targeted Range  Outcome: Progressing     Problem: Stroke, Ischemic (Includes Transient Ischemic Attack)  Goal: Optimal Coping  Outcome: Progressing     Problem: Stroke, Ischemic (Includes Transient Ischemic Attack)  Goal: Effective Bowel Elimination  Outcome: Progressing     Problem: Stroke, Ischemic (Includes Transient Ischemic Attack)  Goal: Optimal Cerebral Tissue Perfusion  Outcome: Progressing     Problem: Stroke, Ischemic (Includes Transient Ischemic Attack)  Goal: Optimal Cognitive Function  Outcome: Progressing

## 2024-12-28 NOTE — PROGRESS NOTES
Ramin Doyle - Neurosurgery ([facility])  Vascular Neurology  Comprehensive Stroke Center  Progress Note    Assessment/Plan:     * Stroke due to occlusion of right posterior cerebral artery  Ms. Willa Kim is a 89 y.o. patient transferred from OSH for right PCA (P1) occlusion monitoring. History of afib on Xarelto, CHF (EF 60%), HTN, DM. Presented with LSW. No baseline NIHSS documented from telestroke. CTH with subacute right MCA infarct; no follow up CTA done. MRI brain with right medial temporo-occipital territory infarct (lateral thalamus) with proximal P1 segment PCA occlusion. OOW for TNK, not IR candidate. NIHSS 9 with LSW and not fully crossing midline. Daughter noticed some dysarthria this AM. Suspected embolic/cardio-embolic etiology. Given some evidence of cerebral atrophy, risk of malignant cerebral edema is low but patient should be closely monitored.    Antithrombotics for secondary stroke prevention:  Xarelto 15 mg po daily with dinner    Statins for secondary stroke prevention and hyperlipidemia, if present:   Statins: Atorvastatin- 40 mg daily    Aggressive risk factor modification: HTN, A-Fib     Rehab efforts: The patient has been evaluated by a stroke team provider and the therapy needs have been fully considered based off the presenting complaints and exam findings. The following therapy evaluations are needed: PT evaluate and treat, OT evaluate and treat, SLP evaluate and treat, PM&R evaluate for appropriate placement, when able     Diagnostics ordered/pending: None     VTE prophylaxis: Mechanical prophylaxis: Place SCDs; Xarelto as above    BP parameters: Infarct: No intervention, SBP <220    Constipation  Miralax and senna-doc BID    CHF (congestive heart failure)  No CHF noted in TTE here. Hold home Lasix 20 mg po every other day due to ARNULFO; giving PRN    A-fib  Patient with paroxysmal (<7 days) atrial fibrillation which is controlled currently with Beta Blocker. Patient is currently in  sinus rhythm.[id number] Score: 5. Patient is anticoagulated with Xarelto 15 mg po daily with dinner    GERD (gastroesophageal reflux disease)  Continue home protonix     Essential hypertension  Patients blood pressure range in the last 24 hours was: BP  Min: 101/58  Max: 162/69.The patient's inpatient anti-hypertensive regimen is listed below:  Current Antihypertensives  labetalol 20 mg/4 mL (5 mg/mL) IV syring, Every 15 min PRN, Intravenous  metoprolol succinate (TOPROL-XL) 24 hr tablet 100 mg, Daily, Oral    Plan  - BP is controlled, no changes needed to their regimen    Urge incontinence  Resumed home oxybutinin   On purewick     Diabetes mellitus, type II  Patient's FSGs are uncontrolled due to hyperglycemia on current medication regimen.  Last A1c reviewed-   Lab Results   Component Value Date    HGBA1C 7.6 (H) 12/20/2024     Most recent fingerstick glucose reviewed-   Recent Labs   Lab 12/27/24  1248 12/27/24  1732 12/27/24  2158 12/28/24  0810   POCTGLUCOSE 193* 182* 166* 174*     Current correctional scale  Low  Increase anti-hyperglycemic dose as follows-   Antihyperglycemics (From admission, onward)      Start     Stop Route Frequency Ordered    12/27/24 0900  insulin glargine U-100 (Lantus) pen 12 Units         -- SubQ Daily 12/27/24 0733    12/19/24 2307  insulin aspart U-100 pen 0-5 Units         -- SubQ Before meals & nightly PRN 12/19/24 2207          Hold Oral hypoglycemics while patient is in the hospital.         12/21/2024 - ZEYAD CHINOS. F/u CTH w/ stable, delineated recent infarct in R. PCA territory. Holding rivaroxaban for the time being. Transaminitis being worked up via liver U/S. Blood glucose above the 140-180 target; added basal glargine.  12/22/2024 - MATTI VSS. F/u CTH w/ stable, delineated recent infarct in R. PCA territory. Holding rivaroxaban for the time being. Transaminitis being worked up via liver U/S - s/f steatosis. Blood glucose trending higher, went up on basal by  2U.  12/23/2024 - NAEON. Working well with PT/OT (she stood up briefly!) Exam improved today. Resuming Xarelto tonight.   12/24/2024 - NAEON. Medically ready, pending SNF. Added bowel regimen.   12/25/2024 - NAEON. New ARNULFO, giving IVF.   12/26/2024 - NAEON. ARNULFO resolved with IVF. Pending SNF.  12/27/2024 - NAEON. Able to cross midline today. Pending SNF.   12/28/2024 - Medically ready pending SNF    STROKE DOCUMENTATION        NIH Scale:  1a. Level of Consciousness: 0-->Alert, keenly responsive  1b. LOC Questions: 0-->Answers both questions correctly  1c. LOC Commands: 0-->Performs both tasks correctly  2. Best Gaze: 0-->Normal  3. Visual: 1-->Partial hemianopia  4. Facial Palsy: 1-->Minor paralysis (flattened nasolabial fold, asymmetry on smiling)  5a. Motor Arm, Left: 1-->Drift, limb holds 90 (or 45) degrees, but drifts down before full 10 seconds, does not hit bed or other support  5b. Motor Arm, Right: 0-->No drift, limb holds 90 (or 45) degrees for full 10 secs  6a. Motor Leg, Left: 2-->Some effort against gravity, leg falls to bed by 5 secs, but has some effort against gravity  6b. Motor Leg, Right: 0-->No drift, leg holds 30 degree position for full 5 secs  7. Limb Ataxia: 0-->Absent  8. Sensory: 0-->Normal, no sensory loss  9. Best Language: 0-->No aphasia, normal  10. Dysarthria: 0-->Normal  11. Extinction and Inattention (formerly Neglect): 0-->No abnormality  Total (NIH Stroke Scale): 5       Modified Perlita    Asya Coma Scale:15   ABCD2 Score:    [id number] Score:   HAS -BLED Score:   ICH Score:   Hunt & Wilson Classification:      Hemorrhagic change of an Ischemic Stroke: Does this patient have an ischemic stroke with hemorrhagic changes? No     Neurologic Chief Complaint: R PCA CVA    Subjective:     Interval History: Patient is seen for follow-up neurological assessment and treatment recommendations: See hospital course    HPI, Past Medical, Family, and Social History remains the same as documented  in the initial encounter.     Review of Systems   Eyes:  Positive for visual disturbance.   Neurological:  Positive for weakness.     Scheduled Meds:   atorvastatin  40 mg Oral Daily    gabapentin  300 mg Oral TID    insulin glargine U-100  12 Units Subcutaneous Daily    metoprolol succinate  100 mg Oral Daily    oxybutynin  10 mg Oral Daily    pantoprazole  40 mg Oral QAM    polyethylene glycol  17 g Oral BID    rivaroxaban  15 mg Oral with dinner    senna-docusate 8.6-50 mg  1 tablet Oral BID     Continuous Infusions:      PRN Meds:  Current Facility-Administered Medications:     acetaminophen, 650 mg, Oral, Q6H PRN    bisacodyL, 10 mg, Rectal, Daily PRN    dextrose 50%, 12.5 g, Intravenous, PRN    dextrose 50%, 25 g, Intravenous, PRN    glucagon (human recombinant), 1 mg, Intramuscular, PRN    glucose, 16 g, Oral, PRN    glucose, 24 g, Oral, PRN    insulin aspart U-100, 0-5 Units, Subcutaneous, QID (AC + HS) PRN    labetalol, 10 mg, Intravenous, Q15 Min PRN    sodium chloride 0.9%, 10 mL, Intravenous, PRN    Objective:     Vital Signs (Most Recent):  Temp: 97.2 °F (36.2 °C) (12/28/24 0810)  Pulse: 72 (12/28/24 0810)  Resp: 18 (12/28/24 0810)  BP: 135/65 (12/28/24 0810)  SpO2: 95 % (12/28/24 0810)  BP Location: Right arm    Vital Signs Range (Last 24H):  Temp:  [97.2 °F (36.2 °C)-98.6 °F (37 °C)]   Pulse:  [64-74]   Resp:  [18-20]   BP: (118-154)/(56-70)   SpO2:  [92 %-95 %]   BP Location: Right arm       Physical Exam  Vitals and nursing note reviewed.   Constitutional:       Appearance: Normal appearance. She is normal weight.   HENT:      Head: Normocephalic and atraumatic.   Cardiovascular:      Rate and Rhythm: Normal rate and regular rhythm.   Pulmonary:      Effort: Pulmonary effort is normal. No respiratory distress.   Musculoskeletal:      Right lower leg: No edema.      Left lower leg: No edema.   Skin:     General: Skin is warm and dry.   Neurological:      Mental Status: She is alert.         Neurological Exam:   LOC: alert  Attention Span: Good   Language: No aphasia  Articulation: No dysarthria  Orientation: Person, Place, Time   Visual Fields: Hemianopsia left    Laboratory:  CMP:   Recent Labs   Lab 12/28/24  0631   CALCIUM 9.2   ALBUMIN 2.6*   PROT 6.2      K 4.3   CO2 23      BUN 17   CREATININE 0.9   ALKPHOS 281*   ALT 77*   AST 46*   BILITOT 0.5     CBC:   Recent Labs   Lab 12/28/24  0631   WBC 7.31   RBC 4.19   HGB 13.3   HCT 39.9      MCV 95   MCH 31.7*   MCHC 33.3       Diagnostic Results     Brain Imaging:  CTH with subacute right MCA infarct      MRI brain with right medial temporo-occipital territory infarct (lateral thalamus) with proximal P1 segment PCA occlusion     Cardiac Imaging     Left Ventricle: The left ventricle is normal in size. Ventricular mass is normal. Normal wall thickness. There is concentric remodeling. Normal wall motion. There is normal systolic function with a visually estimated ejection fraction of 60 - 65%. There is indeterminate diastolic function. Elevated left ventricular filling pressure.    Right Ventricle: Normal right ventricular cavity size. Wall thickness is normal. Systolic function is normal.    Left Atrium: Agitated saline study of the atrial septum is negative after vasalva maneuver, suggesting absence of intracardiac shunt at the atrial level.    Aortic Valve: There is mild annular calcification present.    Mitral Valve: There is moderate mitral annular calcification present.    Pulmonary Artery: Pulmonary artery pressure could not be estimated.    IVC/SVC: Normal venous pressure at 3 mmHg.    Maury Mcfarlane MD  Comprehensive Stroke Center  Department of Vascular Neurology   [facility]

## 2024-12-28 NOTE — ASSESSMENT & PLAN NOTE
Patient's FSGs are uncontrolled due to hyperglycemia on current medication regimen.  Last A1c reviewed-   Lab Results   Component Value Date    HGBA1C 7.6 (H) 12/20/2024     Most recent fingerstick glucose reviewed-   Recent Labs   Lab 12/27/24  1248 12/27/24  1732 12/27/24  2158 12/28/24  0810   POCTGLUCOSE 193* 182* 166* 174*     Current correctional scale  Low  Increase anti-hyperglycemic dose as follows-   Antihyperglycemics (From admission, onward)      Start     Stop Route Frequency Ordered    12/27/24 0900  insulin glargine U-100 (Lantus) pen 12 Units         -- SubQ Daily 12/27/24 0733    12/19/24 2307  insulin aspart U-100 pen 0-5 Units         -- SubQ Before meals & nightly PRN 12/19/24 2207          Hold Oral hypoglycemics while patient is in the hospital.

## 2024-12-28 NOTE — PLAN OF CARE
Problem: Adult Inpatient Plan of Care  Goal: Plan of Care Review  Outcome: Progressing  Goal: Optimal Comfort and Wellbeing  Outcome: Progressing     Problem: Infection  Goal: Absence of Infection Signs and Symptoms  Outcome: Progressing     Problem: Stroke, Ischemic (Includes Transient Ischemic Attack)  Goal: Optimal Coping  Outcome: Progressing

## 2024-12-28 NOTE — ASSESSMENT & PLAN NOTE
Patient with paroxysmal (<7 days) atrial fibrillation which is controlled currently with Beta Blocker. Patient is currently in sinus rhythm.[id number] Score: 5. Patient is anticoagulated with Xarelto 15 mg po daily with dinner

## 2024-12-29 PROBLEM — E87.1 HYPONATREMIA: Status: RESOLVED | Noted: 2024-12-22 | Resolved: 2024-12-29

## 2024-12-29 LAB
ALBUMIN SERPL BCP-MCNC: 2.6 G/DL (ref 3.5–5.2)
ALP SERPL-CCNC: 269 U/L (ref 40–150)
ALT SERPL W/O P-5'-P-CCNC: 60 U/L (ref 10–44)
ANION GAP SERPL CALC-SCNC: 9 MMOL/L (ref 8–16)
AST SERPL-CCNC: 36 U/L (ref 10–40)
BASOPHILS # BLD AUTO: 0.07 K/UL (ref 0–0.2)
BASOPHILS NFR BLD: 0.8 % (ref 0–1.9)
BILIRUB SERPL-MCNC: 0.5 MG/DL (ref 0.1–1)
BUN SERPL-MCNC: 12 MG/DL (ref 8–23)
CALCIUM SERPL-MCNC: 9.3 MG/DL (ref 8.7–10.5)
CHLORIDE SERPL-SCNC: 104 MMOL/L (ref 95–110)
CO2 SERPL-SCNC: 24 MMOL/L (ref 23–29)
CREAT SERPL-MCNC: 0.8 MG/DL (ref 0.5–1.4)
DIFFERENTIAL METHOD BLD: ABNORMAL
EOSINOPHIL # BLD AUTO: 0.2 K/UL (ref 0–0.5)
EOSINOPHIL NFR BLD: 2.9 % (ref 0–8)
ERYTHROCYTE [DISTWIDTH] IN BLOOD BY AUTOMATED COUNT: 11.9 % (ref 11.5–14.5)
EST. GFR  (NO RACE VARIABLE): >60 ML/MIN/1.73 M^2
GLUCOSE SERPL-MCNC: 114 MG/DL (ref 70–110)
HCT VFR BLD AUTO: 35.9 % (ref 37–48.5)
HGB BLD-MCNC: 11.9 G/DL (ref 12–16)
IMM GRANULOCYTES # BLD AUTO: 0.02 K/UL (ref 0–0.04)
IMM GRANULOCYTES NFR BLD AUTO: 0.2 % (ref 0–0.5)
LYMPHOCYTES # BLD AUTO: 2.6 K/UL (ref 1–4.8)
LYMPHOCYTES NFR BLD: 31.4 % (ref 18–48)
MAGNESIUM SERPL-MCNC: 1.7 MG/DL (ref 1.6–2.6)
MCH RBC QN AUTO: 30.9 PG (ref 27–31)
MCHC RBC AUTO-ENTMCNC: 33.1 G/DL (ref 32–36)
MCV RBC AUTO: 93 FL (ref 82–98)
MONOCYTES # BLD AUTO: 0.5 K/UL (ref 0.3–1)
MONOCYTES NFR BLD: 5.9 % (ref 4–15)
NEUTROPHILS # BLD AUTO: 5 K/UL (ref 1.8–7.7)
NEUTROPHILS NFR BLD: 58.8 % (ref 38–73)
NRBC BLD-RTO: 0 /100 WBC
PHOSPHATE SERPL-MCNC: 3.7 MG/DL (ref 2.7–4.5)
PLATELET # BLD AUTO: 288 K/UL (ref 150–450)
PMV BLD AUTO: 10.2 FL (ref 9.2–12.9)
POCT GLUCOSE: 133 MG/DL (ref 70–110)
POCT GLUCOSE: 211 MG/DL (ref 70–110)
POCT GLUCOSE: 226 MG/DL (ref 70–110)
POCT GLUCOSE: 226 MG/DL (ref 70–110)
POTASSIUM SERPL-SCNC: 3.5 MMOL/L (ref 3.5–5.1)
PROT SERPL-MCNC: 5.8 G/DL (ref 6–8.4)
RBC # BLD AUTO: 3.85 M/UL (ref 4–5.4)
SODIUM SERPL-SCNC: 137 MMOL/L (ref 136–145)
WBC # BLD AUTO: 8.42 K/UL (ref 3.9–12.7)

## 2024-12-29 PROCEDURE — 83735 ASSAY OF MAGNESIUM: CPT

## 2024-12-29 PROCEDURE — 25000003 PHARM REV CODE 250

## 2024-12-29 PROCEDURE — 36415 COLL VENOUS BLD VENIPUNCTURE: CPT

## 2024-12-29 PROCEDURE — 85025 COMPLETE CBC W/AUTO DIFF WBC: CPT

## 2024-12-29 PROCEDURE — 84100 ASSAY OF PHOSPHORUS: CPT

## 2024-12-29 PROCEDURE — 80053 COMPREHEN METABOLIC PANEL: CPT

## 2024-12-29 PROCEDURE — 25000003 PHARM REV CODE 250: Performed by: STUDENT IN AN ORGANIZED HEALTH CARE EDUCATION/TRAINING PROGRAM

## 2024-12-29 PROCEDURE — 63600175 PHARM REV CODE 636 W HCPCS

## 2024-12-29 PROCEDURE — 99233 SBSQ HOSP IP/OBS HIGH 50: CPT | Mod: GC,,, | Performed by: PSYCHIATRY & NEUROLOGY

## 2024-12-29 PROCEDURE — 11000001 HC ACUTE MED/SURG PRIVATE ROOM

## 2024-12-29 RX ORDER — FUROSEMIDE 20 MG/1
20 TABLET ORAL EVERY OTHER DAY
Status: DISCONTINUED | OUTPATIENT
Start: 2024-12-29 | End: 2024-12-31 | Stop reason: HOSPADM

## 2024-12-29 RX ORDER — MAGNESIUM SULFATE HEPTAHYDRATE 40 MG/ML
2 INJECTION, SOLUTION INTRAVENOUS ONCE
Status: COMPLETED | OUTPATIENT
Start: 2024-12-29 | End: 2024-12-29

## 2024-12-29 RX ADMIN — INSULIN GLARGINE 12 UNITS: 100 INJECTION, SOLUTION SUBCUTANEOUS at 08:12

## 2024-12-29 RX ADMIN — GABAPENTIN 300 MG: 300 CAPSULE ORAL at 08:12

## 2024-12-29 RX ADMIN — RIVAROXABAN 15 MG: 15 TABLET, FILM COATED ORAL at 04:12

## 2024-12-29 RX ADMIN — FUROSEMIDE 20 MG: 20 TABLET ORAL at 08:12

## 2024-12-29 RX ADMIN — OXYBUTYNIN CHLORIDE 10 MG: 10 TABLET, EXTENDED RELEASE ORAL at 08:12

## 2024-12-29 RX ADMIN — ACETAMINOPHEN 650 MG: 325 TABLET ORAL at 08:12

## 2024-12-29 RX ADMIN — INSULIN ASPART 2 UNITS: 100 INJECTION, SOLUTION INTRAVENOUS; SUBCUTANEOUS at 11:12

## 2024-12-29 RX ADMIN — POTASSIUM BICARBONATE 40 MEQ: 391 TABLET, EFFERVESCENT ORAL at 08:12

## 2024-12-29 RX ADMIN — INSULIN ASPART 1 UNITS: 100 INJECTION, SOLUTION INTRAVENOUS; SUBCUTANEOUS at 08:12

## 2024-12-29 RX ADMIN — PANTOPRAZOLE SODIUM 40 MG: 20 TABLET, DELAYED RELEASE ORAL at 05:12

## 2024-12-29 RX ADMIN — GABAPENTIN 300 MG: 300 CAPSULE ORAL at 03:12

## 2024-12-29 RX ADMIN — ATORVASTATIN CALCIUM 40 MG: 40 TABLET, FILM COATED ORAL at 08:12

## 2024-12-29 RX ADMIN — MAGNESIUM SULFATE HEPTAHYDRATE 2 G: 40 INJECTION, SOLUTION INTRAVENOUS at 08:12

## 2024-12-29 RX ADMIN — METOPROLOL SUCCINATE 100 MG: 100 TABLET, EXTENDED RELEASE ORAL at 08:12

## 2024-12-29 RX ADMIN — INSULIN ASPART 2 UNITS: 100 INJECTION, SOLUTION INTRAVENOUS; SUBCUTANEOUS at 04:12

## 2024-12-29 NOTE — ASSESSMENT & PLAN NOTE
Patient's FSGs are controlled on current medication regimen.  Last A1c reviewed-   Lab Results   Component Value Date    HGBA1C 7.6 (H) 12/20/2024     Most recent fingerstick glucose reviewed-   Recent Labs   Lab 12/28/24  1616 12/28/24 2057 12/29/24 0727 12/29/24  1121   POCTGLUCOSE 149* 129* 133* 211*     Current correctional scale  Low  Increase anti-hyperglycemic dose as follows-   Antihyperglycemics (From admission, onward)      Start     Stop Route Frequency Ordered    12/27/24 0900  insulin glargine U-100 (Lantus) pen 12 Units         -- SubQ Daily 12/27/24 0733    12/19/24 2307  insulin aspart U-100 pen 0-5 Units         -- SubQ Before meals & nightly PRN 12/19/24 2207          Hold Oral hypoglycemics while patient is in the hospital.

## 2024-12-29 NOTE — SUBJECTIVE & OBJECTIVE
Neurologic Chief Complaint: R PCA CVA    Subjective:     Interval History: Patient is seen for follow-up neurological assessment and treatment recommendations: See hospital course    HPI, Past Medical, Family, and Social History remains the same as documented in the initial encounter.     Review of Systems   Eyes:  Positive for visual disturbance.   Neurological:  Positive for weakness.     Scheduled Meds:   atorvastatin  40 mg Oral Daily    furosemide  20 mg Oral Every other day    gabapentin  300 mg Oral TID    insulin glargine U-100  12 Units Subcutaneous Daily    metoprolol succinate  100 mg Oral Daily    oxybutynin  10 mg Oral Daily    pantoprazole  40 mg Oral QAM    polyethylene glycol  17 g Oral BID    rivaroxaban  15 mg Oral with dinner    senna-docusate 8.6-50 mg  1 tablet Oral BID     Continuous Infusions:      PRN Meds:  Current Facility-Administered Medications:     acetaminophen, 650 mg, Oral, Q6H PRN    bisacodyL, 10 mg, Rectal, Daily PRN    dextrose 50%, 12.5 g, Intravenous, PRN    dextrose 50%, 25 g, Intravenous, PRN    glucagon (human recombinant), 1 mg, Intramuscular, PRN    glucose, 16 g, Oral, PRN    glucose, 24 g, Oral, PRN    insulin aspart U-100, 0-5 Units, Subcutaneous, QID (AC + HS) PRN    labetalol, 10 mg, Intravenous, Q15 Min PRN    sodium chloride 0.9%, 10 mL, Intravenous, PRN    Objective:     Vital Signs (Most Recent):  Temp: 97.6 °F (36.4 °C) (12/29/24 1122)  Pulse: 74 (12/29/24 1122)  Resp: 18 (12/29/24 1122)  BP: (!) 125/58 (12/29/24 1122)  SpO2: (!) 94 % (12/29/24 1122)  BP Location: Right arm    Vital Signs Range (Last 24H):  Temp:  [97.5 °F (36.4 °C)-98.8 °F (37.1 °C)]   Pulse:  [61-75]   Resp:  [17-18]   BP: (115-147)/(55-68)   SpO2:  [94 %-96 %]   BP Location: Right arm       Physical Exam  Vitals and nursing note reviewed.   Constitutional:       Appearance: Normal appearance. She is normal weight.   HENT:      Head: Normocephalic and atraumatic.   Cardiovascular:      Rate and  Rhythm: Normal rate and regular rhythm.   Pulmonary:      Effort: Pulmonary effort is normal. No respiratory distress.   Musculoskeletal:      Right lower leg: No edema.      Left lower leg: No edema.   Skin:     General: Skin is warm and dry.   Neurological:      Mental Status: She is alert.        Neurological Exam:   LOC: alert  Attention Span: Good   Language: No aphasia  Articulation: No dysarthria  Orientation: Person, Place, Time   Visual Fields: Hemianopsia left    Laboratory:  CMP:   Recent Labs   Lab 12/29/24 0229   CALCIUM 9.3   ALBUMIN 2.6*   PROT 5.8*      K 3.5   CO2 24      BUN 12   CREATININE 0.8   ALKPHOS 269*   ALT 60*   AST 36   BILITOT 0.5     CBC:   Recent Labs   Lab 12/29/24 0229   WBC 8.42   RBC 3.85*   HGB 11.9*   HCT 35.9*      MCV 93   MCH 30.9   MCHC 33.1       Diagnostic Results     Brain Imaging:  CTH with subacute right MCA infarct      MRI brain with right medial temporo-occipital territory infarct (lateral thalamus) with proximal P1 segment PCA occlusion     Cardiac Imaging     Left Ventricle: The left ventricle is normal in size. Ventricular mass is normal. Normal wall thickness. There is concentric remodeling. Normal wall motion. There is normal systolic function with a visually estimated ejection fraction of 60 - 65%. There is indeterminate diastolic function. Elevated left ventricular filling pressure.    Right Ventricle: Normal right ventricular cavity size. Wall thickness is normal. Systolic function is normal.    Left Atrium: Agitated saline study of the atrial septum is negative after vasalva maneuver, suggesting absence of intracardiac shunt at the atrial level.    Aortic Valve: There is mild annular calcification present.    Mitral Valve: There is moderate mitral annular calcification present.    Pulmonary Artery: Pulmonary artery pressure could not be estimated.    IVC/SVC: Normal venous pressure at 3 mmHg.

## 2024-12-29 NOTE — ASSESSMENT & PLAN NOTE
Patients blood pressure range in the last 24 hours was: BP  Min: 101/58  Max: 162/69.The patient's inpatient anti-hypertensive regimen is listed below:  Current Antihypertensives  labetalol 20 mg/4 mL (5 mg/mL) IV syring, Every 15 min PRN, Intravenous  metoprolol succinate (TOPROL-XL) 24 hr tablet 100 mg, Daily, Oral  furosemide tablet 20 mg, Every other day, Oral    Plan  - BP is controlled, no changes needed to their regimen

## 2024-12-29 NOTE — PROGRESS NOTES
"Ramin Doyle - Neurosurgery ([facility])  Vascular Neurology  Comprehensive Stroke Center  Progress Note    Assessment/Plan:     * Stroke due to occlusion of right posterior cerebral artery  Ms. Willa Kim is a 89 y.o. patient transferred from OSH for right PCA (P1) occlusion monitoring. History of afib on Xarelto, CHF (EF 60%), HTN, DM. Presented with LSW. No baseline NIHSS documented from telestroke. CTH with subacute right MCA infarct; no follow up CTA done. MRI brain with right medial temporo-occipital territory infarct (lateral thalamus) with proximal P1 segment PCA occlusion. OOW for TNK, not IR candidate. NIHSS 9 with LSW and not fully crossing midline. Daughter noticed some dysarthria this AM. Suspected embolic/cardio-embolic etiology. Given some evidence of cerebral atrophy, risk of malignant cerebral edema is low but patient should be closely monitored.    Antithrombotics for secondary stroke prevention:  Xarelto 15 mg po daily with dinner    Statins for secondary stroke prevention and hyperlipidemia, if present:   Statins: Atorvastatin- 40 mg daily    Aggressive risk factor modification: HTN, A-Fib     Rehab efforts: The patient has been evaluated by a stroke team provider and the therapy needs have been fully considered based off the presenting complaints and exam findings. The following therapy evaluations are needed: PT evaluate and treat, OT evaluate and treat, SLP evaluate and treat, PM&R evaluate for appropriate placement, when able     Diagnostics ordered/pending: None     VTE prophylaxis: Mechanical prophylaxis: Place SCDs; Xarelto as above    BP parameters: Infarct: No intervention, SBP <220    Constipation  Miralax and senna-doc BID    Chronic anticoagulation  See "A-fib"    Hyponatremia  resolved    Hemiplegia  See "Stroke due to occlusion of right posterior cerebral artery"    Reduced mobility  See "Stroke due to occlusion of right posterior cerebral artery"    CHF (congestive heart " failure)  No CHF noted in TTE here. Resume home Lasix 20 mg po every other day.     A-fib  Patient with paroxysmal (<7 days) atrial fibrillation which is controlled currently with Beta Blocker. Patient is currently in sinus rhythm.[id number] Score: 5. Patient is anticoagulated with Xarelto 15 mg po daily with dinner    GERD (gastroesophageal reflux disease)  Continue home protonix     Essential hypertension  Patients blood pressure range in the last 24 hours was: BP  Min: 101/58  Max: 162/69.The patient's inpatient anti-hypertensive regimen is listed below:  Current Antihypertensives  labetalol 20 mg/4 mL (5 mg/mL) IV syring, Every 15 min PRN, Intravenous  metoprolol succinate (TOPROL-XL) 24 hr tablet 100 mg, Daily, Oral  furosemide tablet 20 mg, Every other day, Oral    Plan  - BP is controlled, no changes needed to their regimen    Urge incontinence  Resumed home oxybutinin   On purewick     Diabetes mellitus, type II  Patient's FSGs are controlled on current medication regimen.  Last A1c reviewed-   Lab Results   Component Value Date    HGBA1C 7.6 (H) 12/20/2024     Most recent fingerstick glucose reviewed-   Recent Labs   Lab 12/28/24  1616 12/28/24  2057 12/29/24  0727 12/29/24  1121   POCTGLUCOSE 149* 129* 133* 211*     Current correctional scale  Low  Increase anti-hyperglycemic dose as follows-   Antihyperglycemics (From admission, onward)      Start     Stop Route Frequency Ordered    12/27/24 0900  insulin glargine U-100 (Lantus) pen 12 Units         -- SubQ Daily 12/27/24 0733    12/19/24 2307  insulin aspart U-100 pen 0-5 Units         -- SubQ Before meals & nightly PRN 12/19/24 2207          Hold Oral hypoglycemics while patient is in the hospital.         12/21/2024 - SHALONDA CHINO. F/u CTH w/ stable, delineated recent infarct in R. PCA territory. Holding rivaroxaban for the time being. Transaminitis being worked up via liver U/S. Blood glucose above the 140-180 target; added basal glargine.  12/22/2024 -  NAEO, VSS. F/u CTH w/ stable, delineated recent infarct in R. PCA territory. Holding rivaroxaban for the time being. Transaminitis being worked up via liver U/S - s/f steatosis. Blood glucose trending higher, went up on basal by 2U.  12/23/2024 - NAEON. Working well with PT/OT (she stood up briefly!) Exam improved today. Resuming Xarelto tonight.   12/24/2024 - NAEON. Medically ready, pending SNF. Added bowel regimen.   12/25/2024 - NAEON. New ARNULFO, giving IVF.   12/26/2024 - NAEON. ARNULFO resolved with IVF. Pending SNF.  12/27/2024 - NAEON. Able to cross midline today. Pending SNF.   12/28/2024 - Medically ready pending SNF  12/29/2024 - Medically ready pending SNF. Resuming home Lasix 20 every other day now that ARNULFO is resolved.     STROKE DOCUMENTATION        NIH Scale:  1a. Level of Consciousness: 1-->Not alert, but arousable by minor stimulation to obey, answer, or respond  1b. LOC Questions: 0-->Answers both questions correctly  1c. LOC Commands: 0-->Performs both tasks correctly  2. Best Gaze: 0-->Normal  3. Visual: 1-->Partial hemianopia  4. Facial Palsy: 1-->Minor paralysis (flattened nasolabial fold, asymmetry on smiling)  5a. Motor Arm, Left: 2-->Some effort against gravity, limb cannot get to or maintain (if cued) 90 (or 45) degrees, drifts down to bed, but has some effort against gravity  5b. Motor Arm, Right: 0-->No drift, limb holds 90 (or 45) degrees for full 10 secs  6a. Motor Leg, Left: 1-->Drift, leg falls by the end of the 5-sec period but does not hit bed  6b. Motor Leg, Right: 0-->No drift, leg holds 30 degree position for full 5 secs  7. Limb Ataxia: 0-->Absent  8. Sensory: 0-->Normal, no sensory loss  9. Best Language: 0-->No aphasia, normal  10. Dysarthria: 0-->Normal  11. Extinction and Inattention (formerly Neglect): 0-->No abnormality  Total (NIH Stroke Scale): 6       Modified Perlita    [place] Coma Scale:15   ABCD2 Score:    [id number] Score:   HAS -BLED Score:   ICH Score:   Hunt & Wilson  Classification:      Hemorrhagic change of an Ischemic Stroke: Does this patient have an ischemic stroke with hemorrhagic changes? No     Neurologic Chief Complaint: R PCA CVA    Subjective:     Interval History: Patient is seen for follow-up neurological assessment and treatment recommendations: See hospital course    HPI, Past Medical, Family, and Social History remains the same as documented in the initial encounter.     Review of Systems   Eyes:  Positive for visual disturbance.   Neurological:  Positive for weakness.     Scheduled Meds:   atorvastatin  40 mg Oral Daily    furosemide  20 mg Oral Every other day    gabapentin  300 mg Oral TID    insulin glargine U-100  12 Units Subcutaneous Daily    metoprolol succinate  100 mg Oral Daily    oxybutynin  10 mg Oral Daily    pantoprazole  40 mg Oral QAM    polyethylene glycol  17 g Oral BID    rivaroxaban  15 mg Oral with dinner    senna-docusate 8.6-50 mg  1 tablet Oral BID     Continuous Infusions:      PRN Meds:  Current Facility-Administered Medications:     acetaminophen, 650 mg, Oral, Q6H PRN    bisacodyL, 10 mg, Rectal, Daily PRN    dextrose 50%, 12.5 g, Intravenous, PRN    dextrose 50%, 25 g, Intravenous, PRN    glucagon (human recombinant), 1 mg, Intramuscular, PRN    glucose, 16 g, Oral, PRN    glucose, 24 g, Oral, PRN    insulin aspart U-100, 0-5 Units, Subcutaneous, QID (AC + HS) PRN    labetalol, 10 mg, Intravenous, Q15 Min PRN    sodium chloride 0.9%, 10 mL, Intravenous, PRN    Objective:     Vital Signs (Most Recent):  Temp: 97.6 °F (36.4 °C) (12/29/24 1122)  Pulse: 74 (12/29/24 1122)  Resp: 18 (12/29/24 1122)  BP: (!) 125/58 (12/29/24 1122)  SpO2: (!) 94 % (12/29/24 1122)  BP Location: Right arm    Vital Signs Range (Last 24H):  Temp:  [97.5 °F (36.4 °C)-98.8 °F (37.1 °C)]   Pulse:  [61-75]   Resp:  [17-18]   BP: (115-147)/(55-68)   SpO2:  [94 %-96 %]   BP Location: Right arm       Physical Exam  Vitals and nursing note reviewed.   Constitutional:        Appearance: Normal appearance. She is normal weight.   HENT:      Head: Normocephalic and atraumatic.   Cardiovascular:      Rate and Rhythm: Normal rate and regular rhythm.   Pulmonary:      Effort: Pulmonary effort is normal. No respiratory distress.   Musculoskeletal:      Right lower leg: No edema.      Left lower leg: No edema.   Skin:     General: Skin is warm and dry.   Neurological:      Mental Status: She is alert.        Neurological Exam:   LOC: alert  Attention Span: Good   Language: No aphasia  Articulation: No dysarthria  Orientation: Person, Place, Time   Visual Fields: Hemianopsia left    Laboratory:  CMP:   Recent Labs   Lab 12/29/24 0229   CALCIUM 9.3   ALBUMIN 2.6*   PROT 5.8*      K 3.5   CO2 24      BUN 12   CREATININE 0.8   ALKPHOS 269*   ALT 60*   AST 36   BILITOT 0.5     CBC:   Recent Labs   Lab 12/29/24 0229   WBC 8.42   RBC 3.85*   HGB 11.9*   HCT 35.9*      MCV 93   MCH 30.9   MCHC 33.1       Diagnostic Results     Brain Imaging:  CTH with subacute right MCA infarct      MRI brain with right medial temporo-occipital territory infarct (lateral thalamus) with proximal P1 segment PCA occlusion     Cardiac Imaging     Left Ventricle: The left ventricle is normal in size. Ventricular mass is normal. Normal wall thickness. There is concentric remodeling. Normal wall motion. There is normal systolic function with a visually estimated ejection fraction of 60 - 65%. There is indeterminate diastolic function. Elevated left ventricular filling pressure.    Right Ventricle: Normal right ventricular cavity size. Wall thickness is normal. Systolic function is normal.    Left Atrium: Agitated saline study of the atrial septum is negative after vasalva maneuver, suggesting absence of intracardiac shunt at the atrial level.    Aortic Valve: There is mild annular calcification present.    Mitral Valve: There is moderate mitral annular calcification present.    Pulmonary Artery:  Pulmonary artery pressure could not be estimated.    IVC/SVC: Normal venous pressure at 3 mmHg.    Maury Mcfarlane MD  [facility]  Department of Vascular Neurology   [facility] Neurosurgery [facility])

## 2024-12-29 NOTE — ASSESSMENT & PLAN NOTE
Patient with paroxysmal (<7 days) atrial fibrillation which is controlled currently with Beta Blocker. Patient is currently in sinus rhythm.[id number] Score: 5. Patient is anticoagulated with Xarelto 15 mg po daily with dinner

## 2024-12-30 VITALS
HEIGHT: 65 IN | RESPIRATION RATE: 18 BRPM | TEMPERATURE: 98 F | HEART RATE: 69 BPM | WEIGHT: 168 LBS | DIASTOLIC BLOOD PRESSURE: 50 MMHG | BODY MASS INDEX: 27.99 KG/M2 | OXYGEN SATURATION: 95 % | SYSTOLIC BLOOD PRESSURE: 106 MMHG

## 2024-12-30 LAB
ALBUMIN SERPL BCP-MCNC: 2.5 G/DL (ref 3.5–5.2)
ALP SERPL-CCNC: 241 U/L (ref 40–150)
ALT SERPL W/O P-5'-P-CCNC: 48 U/L (ref 10–44)
ANION GAP SERPL CALC-SCNC: 8 MMOL/L (ref 8–16)
AST SERPL-CCNC: 27 U/L (ref 10–40)
BASOPHILS # BLD AUTO: 0.05 K/UL (ref 0–0.2)
BASOPHILS NFR BLD: 0.6 % (ref 0–1.9)
BILIRUB SERPL-MCNC: 0.4 MG/DL (ref 0.1–1)
BUN SERPL-MCNC: 15 MG/DL (ref 8–23)
CALCIUM SERPL-MCNC: 9 MG/DL (ref 8.7–10.5)
CHLORIDE SERPL-SCNC: 102 MMOL/L (ref 95–110)
CO2 SERPL-SCNC: 26 MMOL/L (ref 23–29)
CREAT SERPL-MCNC: 0.8 MG/DL (ref 0.5–1.4)
DIFFERENTIAL METHOD BLD: NORMAL
EOSINOPHIL # BLD AUTO: 0.3 K/UL (ref 0–0.5)
EOSINOPHIL NFR BLD: 3 % (ref 0–8)
ERYTHROCYTE [DISTWIDTH] IN BLOOD BY AUTOMATED COUNT: 11.9 % (ref 11.5–14.5)
EST. GFR  (NO RACE VARIABLE): >60 ML/MIN/1.73 M^2
GLUCOSE SERPL-MCNC: 154 MG/DL (ref 70–110)
HCT VFR BLD AUTO: 39.3 % (ref 37–48.5)
HGB BLD-MCNC: 12.6 G/DL (ref 12–16)
IMM GRANULOCYTES # BLD AUTO: 0.03 K/UL (ref 0–0.04)
IMM GRANULOCYTES NFR BLD AUTO: 0.4 % (ref 0–0.5)
LYMPHOCYTES # BLD AUTO: 2.8 K/UL (ref 1–4.8)
LYMPHOCYTES NFR BLD: 33.7 % (ref 18–48)
MAGNESIUM SERPL-MCNC: 1.9 MG/DL (ref 1.6–2.6)
MCH RBC QN AUTO: 31 PG (ref 27–31)
MCHC RBC AUTO-ENTMCNC: 32.1 G/DL (ref 32–36)
MCV RBC AUTO: 97 FL (ref 82–98)
MONOCYTES # BLD AUTO: 0.6 K/UL (ref 0.3–1)
MONOCYTES NFR BLD: 7.1 % (ref 4–15)
NEUTROPHILS # BLD AUTO: 4.6 K/UL (ref 1.8–7.7)
NEUTROPHILS NFR BLD: 55.2 % (ref 38–73)
NRBC BLD-RTO: 0 /100 WBC
PHOSPHATE SERPL-MCNC: 3.3 MG/DL (ref 2.7–4.5)
PLATELET # BLD AUTO: 247 K/UL (ref 150–450)
PMV BLD AUTO: 11.2 FL (ref 9.2–12.9)
POCT GLUCOSE: 185 MG/DL (ref 70–110)
POCT GLUCOSE: 207 MG/DL (ref 70–110)
POTASSIUM SERPL-SCNC: 3.5 MMOL/L (ref 3.5–5.1)
PROT SERPL-MCNC: 5.7 G/DL (ref 6–8.4)
RBC # BLD AUTO: 4.07 M/UL (ref 4–5.4)
SODIUM SERPL-SCNC: 136 MMOL/L (ref 136–145)
WBC # BLD AUTO: 8.32 K/UL (ref 3.9–12.7)

## 2024-12-30 PROCEDURE — 84100 ASSAY OF PHOSPHORUS: CPT

## 2024-12-30 PROCEDURE — 97110 THERAPEUTIC EXERCISES: CPT

## 2024-12-30 PROCEDURE — 97530 THERAPEUTIC ACTIVITIES: CPT

## 2024-12-30 PROCEDURE — 25000003 PHARM REV CODE 250

## 2024-12-30 PROCEDURE — 36415 COLL VENOUS BLD VENIPUNCTURE: CPT

## 2024-12-30 PROCEDURE — 80053 COMPREHEN METABOLIC PANEL: CPT

## 2024-12-30 PROCEDURE — 83735 ASSAY OF MAGNESIUM: CPT

## 2024-12-30 PROCEDURE — 85025 COMPLETE CBC W/AUTO DIFF WBC: CPT

## 2024-12-30 PROCEDURE — 97112 NEUROMUSCULAR REEDUCATION: CPT

## 2024-12-30 PROCEDURE — 25000003 PHARM REV CODE 250: Performed by: STUDENT IN AN ORGANIZED HEALTH CARE EDUCATION/TRAINING PROGRAM

## 2024-12-30 PROCEDURE — 99233 SBSQ HOSP IP/OBS HIGH 50: CPT | Mod: ,,, | Performed by: PSYCHIATRY & NEUROLOGY

## 2024-12-30 PROCEDURE — 92507 TX SP LANG VOICE COMM INDIV: CPT

## 2024-12-30 PROCEDURE — 97530 THERAPEUTIC ACTIVITIES: CPT | Mod: CQ

## 2024-12-30 RX ADMIN — GABAPENTIN 300 MG: 300 CAPSULE ORAL at 03:12

## 2024-12-30 RX ADMIN — ATORVASTATIN CALCIUM 40 MG: 40 TABLET, FILM COATED ORAL at 08:12

## 2024-12-30 RX ADMIN — ACETAMINOPHEN 650 MG: 325 TABLET ORAL at 08:12

## 2024-12-30 RX ADMIN — SENNOSIDES AND DOCUSATE SODIUM 1 TABLET: 50; 8.6 TABLET ORAL at 08:12

## 2024-12-30 RX ADMIN — POLYETHYLENE GLYCOL 3350 17 G: 17 POWDER, FOR SOLUTION ORAL at 08:12

## 2024-12-30 RX ADMIN — PANTOPRAZOLE SODIUM 40 MG: 20 TABLET, DELAYED RELEASE ORAL at 07:12

## 2024-12-30 RX ADMIN — METOPROLOL SUCCINATE 100 MG: 100 TABLET, EXTENDED RELEASE ORAL at 08:12

## 2024-12-30 RX ADMIN — GABAPENTIN 300 MG: 300 CAPSULE ORAL at 08:12

## 2024-12-30 RX ADMIN — OXYBUTYNIN CHLORIDE 10 MG: 10 TABLET, EXTENDED RELEASE ORAL at 08:12

## 2024-12-30 RX ADMIN — INSULIN GLARGINE 12 UNITS: 100 INJECTION, SOLUTION SUBCUTANEOUS at 08:12

## 2024-12-30 RX ADMIN — RIVAROXABAN 15 MG: 15 TABLET, FILM COATED ORAL at 04:12

## 2024-12-30 NOTE — PROGRESS NOTES
"Ramin Doyle - Neurosurgery ([facility])  Vascular Neurology  Comprehensive Stroke Center  Progress Note    Assessment/Plan:     * Stroke due to occlusion of right posterior cerebral artery  Ms. Willa Kim is a 89 y.o. patient transferred from OSH for right PCA (P1) occlusion monitoring. History of afib on Xarelto, CHF (EF 60%), HTN, DM. Presented with LSW. No baseline NIHSS documented from telestroke. CTH with subacute right MCA infarct; no follow up CTA done. MRI brain with right medial temporo-occipital territory infarct (lateral thalamus) with proximal P1 segment PCA occlusion. OOW for TNK, not IR candidate. NIHSS 9 with LSW and not fully crossing midline. Daughter noticed some dysarthria this AM. Suspected embolic/cardio-embolic etiology. Given some evidence of cerebral atrophy, risk of malignant cerebral edema is low but patient should be closely monitored.    Antithrombotics for secondary stroke prevention:  Xarelto 15 mg po daily with dinner    Statins for secondary stroke prevention and hyperlipidemia, if present:   Statins: Atorvastatin- 40 mg daily    Aggressive risk factor modification: HTN, A-Fib     Rehab efforts: The patient has been evaluated by a stroke team provider and the therapy needs have been fully considered based off the presenting complaints and exam findings. The following therapy evaluations are needed: PT evaluate and treat, OT evaluate and treat, SLP evaluate and treat, PM&R evaluate for appropriate placement, Patient will discharge to SNF    Diagnostics ordered/pending: None     VTE prophylaxis: Mechanical prophylaxis: Place SCDs; Xarelto as above    BP parameters: Infarct: No intervention, SBP <220    Constipation  Miralax and senna-doc BID    Chronic anticoagulation  See "A-fib"    Hemiplegia  See "Stroke due to occlusion of right posterior cerebral artery"    Reduced mobility  See "Stroke due to occlusion of right posterior cerebral artery"    CHF (congestive heart failure)  No " CHF noted in TTE here. Continue home Lasix 20 mg po every other day.     A-fib  Patient with paroxysmal (<7 days) atrial fibrillation which is controlled currently with Beta Blocker. Patient is currently in sinus rhythm.[id number] Score: 5. Patient is anticoagulated with Xarelto 15 mg po daily with dinner    GERD (gastroesophageal reflux disease)  Continue home protonix     Essential hypertension  Patients blood pressure range in the last 24 hours was: BP  Min: 101/58  Max: 162/69.The patient's inpatient anti-hypertensive regimen is listed below:  Current Antihypertensives  labetalol 20 mg/4 mL (5 mg/mL) IV syring, Every 15 min PRN, Intravenous  metoprolol succinate (TOPROL-XL) 24 hr tablet 100 mg, Daily, Oral  furosemide tablet 20 mg, Every other day, Oral    Plan  - BP is controlled, no changes needed to their regimen    Urge incontinence  Continue oxybutinin   On purewick     Diabetes mellitus, type II  Patient's FSGs are controlled on current medication regimen.  Last A1c reviewed-   Lab Results   Component Value Date    HGBA1C 7.6 (H) 12/20/2024     Most recent fingerstick glucose reviewed-   Recent Labs   Lab 12/29/24  1614 12/29/24  2042 12/30/24  1130   POCTGLUCOSE 226* 226* 207*       Current correctional scale  Low  Increase anti-hyperglycemic dose as follows-   Antihyperglycemics (From admission, onward)      Start     Stop Route Frequency Ordered    12/27/24 0900  insulin glargine U-100 (Lantus) pen 12 Units         -- SubQ Daily 12/27/24 0733    12/19/24 2307  insulin aspart U-100 pen 0-5 Units         -- SubQ Before meals & nightly PRN 12/19/24 2207          Hold Oral hypoglycemics while patient is in the hospital.         12/21/2024 - MATTI, VSS. F/u CTH w/ stable, delineated recent infarct in R. PCA territory. Holding rivaroxaban for the time being. Transaminitis being worked up via liver U/S. Blood glucose above the 140-180 target; added basal glargine.  12/22/2024 - MATTI, VSS. F/u CTH w/ stable,  delineated recent infarct in R. PCA territory. Holding rivaroxaban for the time being. Transaminitis being worked up via liver U/S - s/f steatosis. Blood glucose trending higher, went up on basal by 2U.  12/23/2024 - NAEON. Working well with PT/OT (she stood up briefly!) Exam improved today. Resuming Xarelto tonight.   12/24/2024 - NAEON. Medically ready, pending SNF. Added bowel regimen.   12/25/2024 - NAEON. New ARNULFO, giving IVF.   12/26/2024 - NAEON. ARNULFO resolved with IVF. Pending SNF.  12/27/2024 - NAEON. Able to cross midline today. Pending SNF.   12/28/2024 - Medically ready pending SNF  12/29/2024 - Medically ready pending SNF. Resuming home Lasix 20 every other day now that ARNULFO is resolved.   12/30/2024 NAEON. Neuro exam stable. Medically ready for discharge, will discharge to SNF today.    STROKE DOCUMENTATION        NIH Scale:  Interval: 7 days or at discharge (whichever comes first)  1a. Level of Consciousness: 0-->Alert, keenly responsive  1b. LOC Questions: 0-->Answers both questions correctly  1c. LOC Commands: 0-->Performs both tasks correctly  2. Best Gaze: 0-->Normal  3. Visual: 1-->Partial hemianopia  4. Facial Palsy: 1-->Minor paralysis (flattened nasolabial fold, asymmetry on smiling)  5a. Motor Arm, Left: 2-->Some effort against gravity, limb cannot get to or maintain (if cued) 90 (or 45) degrees, drifts down to bed, but has some effort against gravity  5b. Motor Arm, Right: 0-->No drift, limb holds 90 (or 45) degrees for full 10 secs  6a. Motor Leg, Left: 2-->Some effort against gravity, leg falls to bed by 5 secs, but has some effort against gravity  6b. Motor Leg, Right: 0-->No drift, leg holds 30 degree position for full 5 secs  7. Limb Ataxia: 0-->Absent  8. Sensory: 0-->Normal, no sensory loss  9. Best Language: 0-->No aphasia, normal  10. Dysarthria: 0-->Normal  11. Extinction and Inattention (formerly Neglect): 1-->Visual, tactile, auditory, spatial, or personal inattention or extinction  to bilateral simultaneous stimulation in one of the sensory modalities  Total (NIH Stroke Scale): 7       Modified Perlita Score: 4  [place] Coma Scale:    ABCD2 Score:    [id number] Score:   HAS -BLED Score:   ICH Score:   Hunt & Wilson Classification:      Hemorrhagic change of an Ischemic Stroke: Does this patient have an ischemic stroke with hemorrhagic changes? No     Neurologic Chief Complaint: Stroke due to occlusion of right PCA     Subjective:     Interval History: Patient is seen for follow-up neurological assessment and treatment recommendations: See hospital course     HPI, Past Medical, Family, and Social History remains the same as documented in the initial encounter.     Review of Systems   Constitutional:  Negative for chills and fever.   Eyes:  Positive for visual disturbance.   Gastrointestinal:  Negative for nausea and vomiting.   Musculoskeletal:         L Leg pain    Neurological:  Negative for headaches.     Scheduled Meds:   atorvastatin  40 mg Oral Daily    furosemide  20 mg Oral Every other day    gabapentin  300 mg Oral TID    insulin glargine U-100  12 Units Subcutaneous Daily    metoprolol succinate  100 mg Oral Daily    oxybutynin  10 mg Oral Daily    pantoprazole  40 mg Oral QAM    polyethylene glycol  17 g Oral BID    rivaroxaban  15 mg Oral with dinner    senna-docusate 8.6-50 mg  1 tablet Oral BID     Continuous Infusions:  PRN Meds:  Current Facility-Administered Medications:     acetaminophen, 650 mg, Oral, Q6H PRN    bisacodyL, 10 mg, Rectal, Daily PRN    dextrose 50%, 12.5 g, Intravenous, PRN    dextrose 50%, 25 g, Intravenous, PRN    glucagon (human recombinant), 1 mg, Intramuscular, PRN    glucose, 16 g, Oral, PRN    glucose, 24 g, Oral, PRN    insulin aspart U-100, 0-5 Units, Subcutaneous, QID (AC + HS) PRN    labetalol, 10 mg, Intravenous, Q15 Min PRN    sodium chloride 0.9%, 10 mL, Intravenous, PRN    Objective:     Vital Signs (Most Recent):  Temp: 97.8 °F (36.6 °C)  (12/30/24 1130)  Pulse: 69 (12/30/24 1130)  Resp: 18 (12/30/24 1130)  BP: (!) 124/57 (12/30/24 1130)  SpO2: (!) 92 % (12/30/24 1130)  BP Location: Right arm    Vital Signs Range (Last 24H):  Temp:  [97.8 °F (36.6 °C)-98.6 °F (37 °C)]   Pulse:  [63-78]   Resp:  [18]   BP: (104-134)/(57-72)   SpO2:  [92 %-97 %]   BP Location: Right arm       Physical Exam  Constitutional:       General: She is not in acute distress.  HENT:      Head: Normocephalic and atraumatic.      Mouth/Throat:      Mouth: Mucous membranes are moist.   Cardiovascular:      Rate and Rhythm: Normal rate.   Pulmonary:      Effort: Pulmonary effort is normal.   Skin:     General: Skin is warm and dry.   Neurological:      Mental Status: She is alert.            Neurological Exam:   LOC: alert  Attention Span: Good   Language: No aphasia  Articulation: No dysarthria  Orientation: Person, Place, Time   Visual Fields: Hemianopsia left  Facial Movement (CN VII): Symmetric facial expression    Motor: Arm left  Paresis: 4/5  Leg left  Paresis: 4/5  Arm right  Normal 5/5  Leg right Normal 5/5    Laboratory:  CMP:   Recent Labs   Lab 12/30/24  0446   CALCIUM 9.0   ALBUMIN 2.5*   PROT 5.7*      K 3.5   CO2 26      BUN 15   CREATININE 0.8   ALKPHOS 241*   ALT 48*   AST 27   BILITOT 0.4     BMP:   Recent Labs   Lab 12/30/24  0446      K 3.5      CO2 26   BUN 15   CREATININE 0.8   CALCIUM 9.0     CBC:   Recent Labs   Lab 12/30/24  0446   WBC 8.32   RBC 4.07   HGB 12.6   HCT 39.3      MCV 97   MCH 31.0   MCHC 32.1         Diagnostic Results     Brain Imaging:  CTH with subacute right MCA infarct      MRI brain with right medial temporo-occipital territory infarct (lateral thalamus) with proximal P1 segment PCA occlusion     Cardiac Imaging     Left Ventricle: The left ventricle is normal in size. Ventricular mass is normal. Normal wall thickness. There is concentric remodeling. Normal wall motion. There is normal systolic function  with a visually estimated ejection fraction of 60 - 65%. There is indeterminate diastolic function. Elevated left ventricular filling pressure.    Right Ventricle: Normal right ventricular cavity size. Wall thickness is normal. Systolic function is normal.    Left Atrium: Agitated saline study of the atrial septum is negative after vasalva maneuver, suggesting absence of intracardiac shunt at the atrial level.    Aortic Valve: There is mild annular calcification present.    Mitral Valve: There is moderate mitral annular calcification present.    Pulmonary Artery: Pulmonary artery pressure could not be estimated.    IVC/SVC: Normal venous pressure at 3 mmHg.    Ольга Newman PA-C  [facility]  Department of Vascular Neurology   [facility] Neurosurgery [facility])

## 2024-12-30 NOTE — ASSESSMENT & PLAN NOTE
Patient's FSGs are controlled on current medication regimen.  Last A1c reviewed-   Lab Results   Component Value Date    HGBA1C 7.6 (H) 12/20/2024     Most recent fingerstick glucose reviewed-   Recent Labs   Lab 12/29/24  1614 12/29/24  2042 12/30/24  1130   POCTGLUCOSE 226* 226* 207*       Current correctional scale  Low  Increase anti-hyperglycemic dose as follows-   Antihyperglycemics (From admission, onward)    Start     Stop Route Frequency Ordered    12/27/24 0900  insulin glargine U-100 (Lantus) pen 12 Units         -- SubQ Daily 12/27/24 0733    12/19/24 2307  insulin aspart U-100 pen 0-5 Units         -- SubQ Before meals & nightly PRN 12/19/24 2207        Hold Oral hypoglycemics while patient is in the hospital.

## 2024-12-30 NOTE — ASSESSMENT & PLAN NOTE
Patient with paroxysmal (<7 days) atrial fibrillation which is controlled currently with Beta Blocker. Patient is currently in sinus rhythm.[id number] Score: 5. Patient is anticoagulated with Xarelto 15 mg po daily with dinner

## 2024-12-30 NOTE — PLAN OF CARE
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 [I63.531]  Yes    Constipation [K59.00]  No    Reduced mobility [Z74.09]  Yes    Hemiplegia [G81.90]  Yes     Due to stroke  Therapy to evaluate and treat       Chronic anticoagulation [Z79.01]  Not Applicable    CHF (congestive heart failure) [I50.9]  Yes    A-fib [I48.91]  Yes    Essential hypertension [I10]  Yes    GERD (gastroesophageal reflux disease) [K21.9]  Yes    Urge incontinence [N39.41]  Yes    Diabetes mellitus, type II [E11.9]  Yes      Resolved Hospital Problems    Diagnosis Date Resolved POA    ARNULFO (acute kidney injury) [N17.9] 12/27/2024 No    Hypercoagulable state due to atrial fibrillation [D68.69, I48.91] 12/23/2024 Yes    Hyponatremia [E87.1] 12/29/2024 No       Monitor with daily cmp         Patient is homebound due to:  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    Diclofenac sodium     Keflex [cephalexin] Other (See Comments)     blisters    Meloxicam     Pcn [penicillins] Swelling    Percodan [oxycodone hcl-oxycodone-asa]      Itching    Sulfa (sulfonamide antibiotics) Nausea And Vomiting    Trimethoprim Itching    Vibramycin [doxycycline calcium]      Heart race       Vitals:  Routine    Diet: diabetic diet: 2000 calorie    Activities:   Activity as tolerated    Goals of Care Treatment Preferences:  Code Status: Full Code      Labs:  Per facility protocol    Nursing Precautions:  Fall    Consults:   PT to evaluate and treat- 4  times a week, OT to evaluate and treat- 4 times a week, and ST to evaluate and treat- 3 times a week     Miscellaneous Care: Routine Skin for Bedridden Patients:  Apply moisture barrier cream to all  Diabetes Care: Diabetes: Check blood sugar. Fingerstick blood sugar AC and HS                   Diabetes Care:  SN to perform and educate Diabetic management with blood glucose monitoring:, Fingerstick blood sugar AC and HS, and Report CBG < 60 or > 350 to physician.      Medications: Discontinue all previous medication orders, if any. See new list below.     Medication List        CHANGE how you take these medications      atorvastatin 40 MG tablet  Commonly known as: LIPITOR  Take 1 tablet (40 mg total) by mouth once daily.  What changed:   medication strength  how much to take  when to take this            CONTINUE taking these medications      * ACCU-CHEK GUIDE TEST STRIPS Strp  Generic drug: blood sugar diagnostic  USE 1 STRIP TWICE DAILY     * ACCU-CHEK GUIDE TEST STRIPS MISC  by Misc.(Non-Drug; Combo Route) route.     ACCU-CHEK SOFT DEV LANCETS Kit  Generic drug: lancing device with lancets  USE AS DIRECTED TO CHECK GLUCOSE     * ACCU-CHEK SOFTCLIX LANCETS Misc  Generic drug: lancets  Apply topically 2 (two) times daily.     * ACCU-CHEK SOFTCLIX LANCETS MISC  by Misc.(Non-Drug; Combo Route) route.     acetaminophen 325 MG tablet  Commonly known as: TYLENOL  Take 2 tablets (650 mg total) by mouth every 6 (six) hours as needed for Pain.     amiodarone 200 MG Tab  Commonly known as: PACERONE  Take 100 mg by mouth every morning.     calcium carbonate 600 mg calcium (1,500 mg) Tab  Commonly known as: OS-PAU  Take 1,200 mg by mouth 2 (two) times daily with meals.     calcium-vitamin D3 500 mg-5 mcg (200 unit) per tablet  Commonly known as: OS- + D3  Take 1 tablet by mouth 2 (two) times daily with meals.     FARXIGA 5 mg Tab tablet  Generic drug: dapagliflozin propanediol  Take 5 mg by mouth.     furosemide 20 MG  tablet  Commonly known as: LASIX  Take 20 mg by mouth every other day.     gabapentin 300 MG capsule  Commonly known as: NEURONTIN  Take 300 mg by mouth 3 (three) times daily.     * glipiZIDE 10 MG tablet  Commonly known as: GLUCOTROL  Take 20 mg by mouth 2 (two) times daily.     loratadine 10 mg tablet  Commonly known as: CLARITIN  Take 10 mg by mouth once daily.     metoprolol tartrate 50 MG tablet  Commonly known as: LOPRESSOR  Take 50 mg by mouth 2 (two) times daily.     multivitamin per tablet  Commonly known as: THERAGRAN  Take 1 tablet by mouth once daily.     mv-min-FA-D3-om3-dha-epa-fish 200 mcg-500 unit-200 mg Cap  Take 1 capsule by mouth 2 (two) times daily.     olmesartan 5 MG Tab  Commonly known as: BENICAR  Take by mouth once daily. 2.5 mg daily     oxybutynin 10 MG 24 hr tablet  Commonly known as: DITROPAN-XL  oxybutynin chloride ER 10 mg tablet,extended release 24 hr   TAKE 1 TABLET BY MOUTH  DAILY AS DIRECTED     pantoprazole 40 MG tablet  Commonly known as: PROTONIX  Take 40 mg by mouth every morning.     rivaroxaban 15 mg Tab  Commonly known as: XARELTO  Take 15 mg by mouth every evening.     triamcinolone acetonide 0.1% 0.1 % cream  Commonly known as: KENALOG  Apply topically 2 (two) times daily.     VITAMIN C 250 mg Chew  Generic drug: ascorbic acid (vitamin C)  Take by mouth.           * This list has 5 medication(s) that are the same as other medications prescribed for you. Read the directions carefully, and ask your doctor or other care provider to review them with you.                ASK your doctor about these medications      diltiaZEM 120 MG Cp24  Commonly known as: CARDIZEM CD  diltiazem ER (XR/XT) 120 mg capsule,extended release 24 hr, controlled     * glipiZIDE 5 MG tablet  Commonly known as: GLUCOTROL  Take 5 mg by mouth 2 (two) times daily.     latanoprost 0.005 % ophthalmic solution  latanoprost 0.005 % eye drops     losartan 50 MG tablet  Commonly known as: COZAAR  Take 50 mg by  mouth every morning.     UNABLE TO FIND  medication name: Clindamycin/ Mupirocin/ Itraconazole 150/20/50mg Topical Capsule     UNABLE TO FIND  medication name: gentamicin 60 mg Xylitol           * This list has 1 medication(s) that are the same as other medications prescribed for you. Read the directions carefully, and ask your doctor or other care provider to review them with you.                    Immunizations Administered as of 12/30/2024       Name Date Dose VIS Date Route Exp Date    COVID-19, MRNA, LN-S, PF (Pfizer) (Purple Cap) 11/30/2021 0.3 mL -- -- --    Lot:                     [id number]                       External: Auto Reconciled From Outside Source                    _________________________________  Ольга Newman PA-C  12/30/2024

## 2024-12-30 NOTE — PT/OT/SLP PROGRESS
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 female with an SLP diagnosis of Cognitive-Linguistic Impairment.  She presents with L sided neglect.    Subjective     Pt resting upon arrival but easily awoken. Pt required cues throughout to maintain alertness. Pt agreeable to participate.    Pain/Comfort:  Pain Rating 1: 0/10    Respiratory Status: Room air    Objective:     Has the patient been evaluated by SLP for swallowing?   Yes  Keep patient NPO? No   Current Respiratory Status:        Pt seen for ongoing cognitive therapy. Pt resting upon arrival but easily awoken. Pt required cues throughout to maintain alertness 2/2 tiredness. Pt with L sided neglect. Pt identified objects placed in midline with 100% accuracy with min-mod cues. Pt required moderate cues to attend to L. SLP discussed strategies for L sided neglect. Pt required repetitions of task instructions 2/2 decreased attention and increased tiredness. SLP provided education regarding role of SLP, L neglect strategies, recommendations, post acute speech therapy, and SLP POC. Pt's daughter expressed understanding. SLP will continue to follow.     Goals:   Multidisciplinary Problems       SLP Goals          Problem: SLP    Goal Priority Disciplines Outcome   SLP Goal     SLP Progressing   Description: Speech  Language Pathology Goals  Goals expected to be met by 1/3    1. Pt will tolerate least restrictive and safest diet to maintain hydration and nutritional needs without signs of airway compromise.  2. Pt will orient x4  3. Pt will answer complex y/n questions with 80% accuracy  4. Pt will complete functional naming tasks with 80% accuracy  5. Pt will participate in ongoing assessment of reading, writing, and visual-spatial skills.                               Plan:     Patient to be seen:  3 x/week   Plan of Care expires:  01/17/25  Plan of Care reviewed with:  patient, daughter   SLP Follow-Up:  Yes       Discharge recommendations:  Moderate Intensity Therapy     Time Tracking:     SLP Treatment Date:   12/30/24  Speech Start Time:  0942  Speech Stop Time:  0951     Speech Total Time (min):  9 min    Billable Minutes: Speech Therapy Individual 9    12/30/2024

## 2024-12-30 NOTE — PLAN OF CARE
CHW met with patient/family at bedside. Patient experience rounding completed and reviewed the following.     Do you know your discharge plan? Yes SNF    If yes, what is the plan? (Home, Home Health, Rehab, SNF, LTAC, or Other)     Have you discussed your needs and preferences with your SW/CM? Yes      If you are discharging home, do you have help at home? Yes   Patient is going to rehab facility.    Do you think you will need help additional at home at discharge?   No   Patient has no need for additional help.    Do you currently have difficulty keeping up with bills, affording medicine or buying food? No  Patient has help with bills, food, and medicine.    Assigned SW/CM notified of any patient/family needs or concerns. Appropriate resources provided to address patient's needs.    Tamara COVINGTON, RSW  Case Management  [phone]   mother smoked

## 2024-12-30 NOTE — SUBJECTIVE & OBJECTIVE
Neurologic Chief Complaint: Stroke due to occlusion of right PCA     Subjective:     Interval History: Patient is seen for follow-up neurological assessment and treatment recommendations: See hospital course     HPI, Past Medical, Family, and Social History remains the same as documented in the initial encounter.     Review of Systems   Constitutional:  Negative for chills and fever.   Eyes:  Positive for visual disturbance.   Gastrointestinal:  Negative for nausea and vomiting.   Musculoskeletal:         L Leg pain    Neurological:  Negative for headaches.     Scheduled Meds:   atorvastatin  40 mg Oral Daily    furosemide  20 mg Oral Every other day    gabapentin  300 mg Oral TID    insulin glargine U-100  12 Units Subcutaneous Daily    metoprolol succinate  100 mg Oral Daily    oxybutynin  10 mg Oral Daily    pantoprazole  40 mg Oral QAM    polyethylene glycol  17 g Oral BID    rivaroxaban  15 mg Oral with dinner    senna-docusate 8.6-50 mg  1 tablet Oral BID     Continuous Infusions:  PRN Meds:  Current Facility-Administered Medications:     acetaminophen, 650 mg, Oral, Q6H PRN    bisacodyL, 10 mg, Rectal, Daily PRN    dextrose 50%, 12.5 g, Intravenous, PRN    dextrose 50%, 25 g, Intravenous, PRN    glucagon (human recombinant), 1 mg, Intramuscular, PRN    glucose, 16 g, Oral, PRN    glucose, 24 g, Oral, PRN    insulin aspart U-100, 0-5 Units, Subcutaneous, QID (AC + HS) PRN    labetalol, 10 mg, Intravenous, Q15 Min PRN    sodium chloride 0.9%, 10 mL, Intravenous, PRN    Objective:     Vital Signs (Most Recent):  Temp: 97.8 °F (36.6 °C) (12/30/24 1130)  Pulse: 69 (12/30/24 1130)  Resp: 18 (12/30/24 1130)  BP: (!) 124/57 (12/30/24 1130)  SpO2: (!) 92 % (12/30/24 1130)  BP Location: Right arm    Vital Signs Range (Last 24H):  Temp:  [97.8 °F (36.6 °C)-98.6 °F (37 °C)]   Pulse:  [63-78]   Resp:  [18]   BP: (104-134)/(57-72)   SpO2:  [92 %-97 %]   BP Location: Right arm       Physical Exam  Constitutional:        General: She is not in acute distress.  HENT:      Head: Normocephalic and atraumatic.      Mouth/Throat:      Mouth: Mucous membranes are moist.   Cardiovascular:      Rate and Rhythm: Normal rate.   Pulmonary:      Effort: Pulmonary effort is normal.   Skin:     General: Skin is warm and dry.   Neurological:      Mental Status: She is alert.            Neurological Exam:   LOC: alert  Attention Span: Good   Language: No aphasia  Articulation: No dysarthria  Orientation: Person, Place, Time   Visual Fields: Hemianopsia left  Facial Movement (CN VII): Symmetric facial expression    Motor: Arm left  Paresis: 4/5  Leg left  Paresis: 4/5  Arm right  Normal 5/5  Leg right Normal 5/5    Laboratory:  CMP:   Recent Labs   Lab 12/30/24  0446   CALCIUM 9.0   ALBUMIN 2.5*   PROT 5.7*      K 3.5   CO2 26      BUN 15   CREATININE 0.8   ALKPHOS 241*   ALT 48*   AST 27   BILITOT 0.4     BMP:   Recent Labs   Lab 12/30/24  0446      K 3.5      CO2 26   BUN 15   CREATININE 0.8   CALCIUM 9.0     CBC:   Recent Labs   Lab 12/30/24  0446   WBC 8.32   RBC 4.07   HGB 12.6   HCT 39.3      MCV 97   MCH 31.0   MCHC 32.1         Diagnostic Results     Brain Imaging:  CTH with subacute right MCA infarct      MRI brain with right medial temporo-occipital territory infarct (lateral thalamus) with proximal P1 segment PCA occlusion     Cardiac Imaging     Left Ventricle: The left ventricle is normal in size. Ventricular mass is normal. Normal wall thickness. There is concentric remodeling. Normal wall motion. There is normal systolic function with a visually estimated ejection fraction of 60 - 65%. There is indeterminate diastolic function. Elevated left ventricular filling pressure.    Right Ventricle: Normal right ventricular cavity size. Wall thickness is normal. Systolic function is normal.    Left Atrium: Agitated saline study of the atrial septum is negative after vasalva maneuver, suggesting absence of  intracardiac shunt at the atrial level.    Aortic Valve: There is mild annular calcification present.    Mitral Valve: There is moderate mitral annular calcification present.    Pulmonary Artery: Pulmonary artery pressure could not be estimated.    IVC/SVC: Normal venous pressure at 3 mmHg.

## 2024-12-30 NOTE — ASSESSMENT & PLAN NOTE
Ms. Willa Kim is a 89 y.o. patient transferred from OSH for right PCA (P1) occlusion monitoring. History of afib on Xarelto, CHF (EF 60%), HTN, DM. Presented with LSW. No baseline NIHSS documented from telestroke. CTH with subacute right MCA infarct; no follow up CTA done. MRI brain with right medial temporo-occipital territory infarct (lateral thalamus) with proximal P1 segment PCA occlusion. OOW for TNK, not IR candidate. NIHSS 9 with LSW and not fully crossing midline. Daughter noticed some dysarthria this AM. Suspected embolic/cardio-embolic etiology. Given some evidence of cerebral atrophy, risk of malignant cerebral edema is low but patient should be closely monitored.    Antithrombotics for secondary stroke prevention:  Xarelto 15 mg po daily with dinner    Statins for secondary stroke prevention and hyperlipidemia, if present:   Statins: Atorvastatin- 40 mg daily    Aggressive risk factor modification: HTN, A-Fib     Rehab efforts: The patient has been evaluated by a stroke team provider and the therapy needs have been fully considered based off the presenting complaints and exam findings. The following therapy evaluations are needed: PT evaluate and treat, OT evaluate and treat, SLP evaluate and treat, PM&R evaluate for appropriate placement, Patient will discharge to SNF    Diagnostics ordered/pending: None     VTE prophylaxis: Mechanical prophylaxis: Place SCDs; Xarelto as above    BP parameters: Infarct: No intervention, SBP <220

## 2024-12-30 NOTE — PT/OT/SLP PROGRESS
"Physical Therapy Treatment  Co Treatment with Occupational Therapy    Patient Name:  Willa Kim   MRN:  [id number]    Recommendations:     Discharge Recommendations: Moderate Intensity Therapy  Discharge Equipment Recommendations: lift device, hospital bed  Barriers to discharge: Decreased caregiver support at current functional status and Increased level of assistance required    Assessment:     Willa Kim is a 89 y.o. 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  She presents with the following impairments/functional limitations: weakness, impaired endurance, impaired self care skills, impaired functional mobility, decreased lower extremity function, gait instability, decreased upper extremity function.    Pt met with HOB elevated and agreeable to session. Pt tolerates session well with emphasis on bed mobility, transfers, and sitting balance. Pt continues to require significant physical assist (Max-total x2 persons) with all visualized functional mobility. Pt with noted L neglect, unable to cross midline with visual and verbal cueing. Mild pushing with RUE noted in sitting EOB with severe L lateral lean present once pt fatigues. Pt will continue to benefit from skilled therapy services.    Rehab Prognosis: Good; patient would benefit from acute skilled PT services to address these deficits and reach maximum level of function.    Recent Surgery: * No surgery found *      Plan:     During this hospitalization, patient to be seen 4 x/week to address the identified rehab impairments via gait training, therapeutic activities, neuromuscular re-education and progress toward the following goals:    Plan of Care Expires:  01/20/25    Subjective     Chief Complaint: fatigue  Patient/Family Comments/goals: pt agreeable to session. "Tell them I prefer soup over potatoes"  Pain/Comfort:  Pain Rating 1: 0/10  Pain Rating Post-Intervention 1: " 0/10      Objective:     Communicated with RN (Matt)  prior to session.  Patient found HOB elevated with SCD, bed alarm, PureWick, telemetry upon PTA entry to room.     General Precautions: Standard, fall, aspiration  Orthopedic Precautions: N/A  Braces: N/A  Respiratory Status: Room air     Functional Mobility:  Bed Mobility:     Scooting: anteriorly to EOB maximal assistance  Supine to Sit: maximal assistance x2 persons for Trunk/BLEs  Sit to Supine: maximal assistance x2 persons for Trunk/BLEs  Transfers:     Sit to Stand:  total assistance x2 persons with no AD/VAIBHAV HHA  Pt with no initiation to stand and clears <25% of hips from EOB.   Balance:  Sitting Balance at EOB:  Level of Assist Required: Maximum Assistance-Total A  Deviations noted: poor trunk control, light pushing from RUE, decreased protective response, posterior lean, severe L lateral lean once fatigued  Verbal and tactile cues required for upright  posture, scapular retraction, core activation, correction to midline  Total Time Sitting EOB: ~10 minutes    AM-PAC 6 CLICK MOBILITY  Turning over in bed (including adjusting bedclothes, sheets and blankets)?: 2  Sitting down on and standing up from a chair with arms (e.g., wheelchair, bedside commode, etc.): 1  Moving from lying on back to sitting on the side of the bed?: 2  Moving to and from a bed to a chair (including a wheelchair)?: 1  Need to walk in hospital room?: 1  Climbing 3-5 steps with a railing?: 1  Basic Mobility Total Score: 8       Treatment & Education:  Patient provided with daily orientation and goals of this PT session.     Pt educated to call for assistance and to transfer with hospital staff only.  Also, pt was educated on the effects of prolonged immobility and the importance of performing OOB activity and exercises to promote healing and reduce recovery time.    Co tx performed with OT due to patient need for 2 skilled therapist to ensure patient and staff safety and to  accommondate for activity tolerance.    Patient left HOB elevated with all lines intact, call button in reach, bed alarm on, and RN notified.    GOALS:   Multidisciplinary Problems       Physical Therapy Goals          Problem: Physical Therapy    Goal Priority Disciplines Outcome Interventions   Physical Therapy Goal     PT, PT/OT Progressing    Description: Goals to be met by: 2025     Patient will increase functional independence with mobility by performin. Supine to sit with Moderate Assistance  2. Sit to supine with Moderate Assistance  3. Sit to stand transfer with Maximum Assistance  4. Bed to chair transfer with Maximum Assistance using LRAD  5. Gait  x 5 feet with Maximum Assistance using LRAD.   6. Sitting at edge of bed x5 minutes with Minimal Assistance  7. Lower extremity exercise program x15 reps per handout, with assistance as needed    Hospital Bed - Patient requires a hospital bed for positioning of the body in ways that are not feasible with an ordinary bed. The patient requires special positioning for pain relief, limited mobility, and/or being unable to independently make changes in body position without the use of a hospital bed. Pillows and wedges will not be adequate for resolving these positional issues.                            Time Tracking:     PT Received On: 24  PT Start Time: 1144     PT Stop Time: 1205  PT Total Time (min): 21 min     Billable Minutes: Therapeutic Activity 21    Treatment Type: Treatment  PT/PTA: PTA     Number of PTA visits since last PT visit: 2024

## 2024-12-30 NOTE — PT/OT/SLP PROGRESS
Occupational Therapy  Co- Treatment    Name: Willa Kim  MRN: [id number]  Admitting Diagnosis:  Stroke due to occlusion of right posterior cerebral artery       Recommendations:     Discharge Recommendations: Moderate Intensity Therapy  Discharge Equipment Recommendations:  lift device, hospital bed  Barriers to discharge:  None    Assessment:     Willa Kim is a 89 y.o. female with a medical diagnosis of Stroke due to occlusion of right posterior cerebral artery.  She presents with decrease functional status secondary to medical diagnosis. Performance deficits affecting function are weakness, impaired endurance, impaired self care skills, impaired functional mobility, decreased lower extremity function, gait instability, decreased upper extremity function, impaired fine motor, decreased coordination, decreased ROM. Patient with good tolerance to OT session limited by increased fatigue 2/2 patient verbalizing not sleeping well. Patient requiring max x2 assistance for all bed mobility and sit to stand trials during session. Patient remains appropriate candidate for acute OT services.     Rehab Prognosis:  Good; patient would benefit from acute skilled OT services to address these deficits and reach maximum level of function.       Plan:     Patient to be seen 4 x/week to address the above listed problems via self-care/home management, therapeutic activities, therapeutic exercises, neuromuscular re-education  Plan of Care Expires: 01/20/25  Plan of Care Reviewed with: patient    Subjective     Chief Complaint: none   Patient/Family Comments/goals: DC   Pain/Comfort:  Pain Rating 1: 0/10    Objective:     Communicated with: RN prior to session.  Patient found supine with SCD, bed alarm, PureWick, telemetry upon OT entry to room.    General Precautions: Standard, fall, aspiration    Orthopedic Precautions:N/A  Braces: N/A  Respiratory Status: Room air     Occupational Performance:     Bed Mobility:     Patient completed Scooting/Bridging with maximal assistance  Patient completed Supine to Sit with maximal assistance and 2 persons  Patient completed Sit to Supine with maximal assistance and 2 persons   Patient sat EOB with max assistance; patient requiring increased tactile/verbal cueing for postural stability and trunk activation; while seated EOB OT completed the following  LUE AAROM/AROM in all planes; provided tactile and verbal cues for attention and patient participation to encourage rehabilitation of neuro pathways   L sided stimulation 2/2 patient with L sided neglect requiring increased cueing for attention to LUE for therapeutic task   Postural cueing for upright tolerance and midline orientation patient with increased anterior/L lean with prolonged EOB sitting     Functional Mobility/Transfers:  Patient completed Sit <> Stand Transfer with total assistance and of 2 persons  with  hand-held assist x2 trials, minimal hip clearance     Activities of Daily Living:  Feeding:  set up assistance supine in bed with HOB elevated       Treatment & Education:  Co-Treatment conducted due to patient medical instability and to promote patient safety. OT provided education on home recommendations and fall prevention in preparation for D/C. Provided education regarding role of OT, POC, & discharge recommendations.  Pt with no further questions/concerns at this time.     Patient left supine with all lines intact and call button in reach    GOALS:   Multidisciplinary Problems       Occupational Therapy Goals          Problem: Occupational Therapy    Goal Priority Disciplines Outcome Interventions   Occupational Therapy Goal     OT, PT/OT Progressing    Description: Goals to be met by: 1/20/24     Patient will increase functional independence with ADLs by performing:    UE Dressing with Moderate Assistance.  LE Dressing with Moderate Assistance.  Grooming while EOB with Minimal Assistance.  Toileting from bedside  commode with Maximum Assistance for hygiene and clothing management.   Sitting at edge of bed x10 minutes with Supervision.  Supine to sit with Supervision.  Stand pivot transfers with Maximum Assistance.  Step transfer with Maximum Assistance  Toilet transfer to bedside commode with Maximum Assistance.                         Time Tracking:     OT Date of Treatment: 12/30/24  OT Start Time: 1145  OT Stop Time: 1211  OT Total Time (min): 26 min    Billable Minutes:Therapeutic Activity 13  Neuromuscular Re-education 13    OT/ISAAC: OT          12/30/2024

## 2024-12-31 NOTE — PLAN OF CARE
[facility] Neurosurgery (Hospital)  Discharge Final Note    Primary Care Provider: Rajiv Conte PA-C    Expected Discharge Date: 12/30/2024    Final Discharge Note (most recent)       Final Note - 12/30/24 1841          Post-Acute Status    Post-Acute Authorization Placement (P)      Post-Acute Placement Status Set-up Complete/Auth obtained (P)      Discharge Delays Payor Issues (P)                  Patient discharged to [facility] swing bed.  RN given number for report and SW set up stretcher transport for 530pm.  Patient's dtr Nuzhat notified.      No future appointments.    Tamara Schwartz LMSW  [facility]  [phone]      Important Message from Medicare  Important Message from Medicare regarding Discharge Appeal Rights: Given to patient/caregiver, Explained to patient/caregiver, Signed/date by patient/caregiver     Date IMM was signed: 12/30/24  Time IMM was signed: 1308    Contact Info       [facility] VASCULAR NEUROLOGY   Specialty: Vascular Neurology    [address]  [facility] [address]   Phone: [phone]       Next Steps: Follow up    Instructions: clinic will contact you for follow up appt

## 2024-12-31 NOTE — DISCHARGE SUMMARY
[facility] Neurosurgery ([facility])  Vascular Neurology  Comprehensive Stroke Center  Discharge Summary     Summary:     Admit Date: 12/19/2024  7:20 PM    Discharge Date and Time:  12/30/2024 7:51 PM    Attending Physician: James Masterson MD     Discharge Provider: Ольга Newman PA-C    History of Present Illness: Ms. Willa Kim is a 89 y.o. patient transferred from [facility] for close monitoring of right PCA (P1) occlusion. History of afib on Xarelto, CHF (EF 60%), HTN, DM. Presented to OSH with LSW, LFD. Telestroke consult performed, no baseline NIHSS documented. CTH with subacute right MCA infarct; no follow up CTA done. MRI brain with right medial temporo-occipital territory infarct (lateral thalamus) with proximal P1 segment PCA occlusion. OOW for TNK, not IR candidate. Alert, following commands. Initial NIHSS 9 upon arrival to [facility] with LSW, dysarthria since this morning and some difficulty crossing midline.     Hospital Course (synopsis of major diagnoses, care, treatment, and services provided during the course of the hospital stay): 12/21/2024 - NAEO, VSS. F/u CTH w/ stable, delineated recent infarct in R. PCA territory. Holding rivaroxaban for the time being. Transaminitis being worked up via liver U/S. Blood glucose above the 140-180 target; added basal glargine.  12/22/2024 - NAEO, VSS. F/u CTH w/ stable, delineated recent infarct in R. PCA territory. Holding rivaroxaban for the time being. Transaminitis being worked up via liver U/S - s/f steatosis. Blood glucose trending higher, went up on basal by 2U.  12/23/2024 - NAEON. Working well with PT/OT (she stood up briefly!) Exam improved today. Resuming Xarelto tonight.   12/24/2024 - NAEON. Medically ready, pending SNF. Added bowel regimen.   12/25/2024 - NAEON. New ARNULFO, giving IVF.   12/26/2024 - NAEON. ARNULFO resolved with IVF. Pending SNF.  12/27/2024 - NAEON. Able to cross midline today. Pending SNF.   12/28/2024 - Medically ready  pending SNF  12/29/2024 - Medically ready pending SNF. Resuming home Lasix 20 every other day now that ARNULFO is resolved.   12/30/2024 NAEON. Neuro exam stable. Medically ready for discharge, will discharge to SNF today.    Goals of Care Treatment Preferences:  Code Status: Full Code      Stroke Etiology: Ischemic Cardioembolic Atrial fibrillation    STROKE DOCUMENTATION         NIH Scale:  Interval: 7 days or at discharge (whichever comes first)  1a. Level of Consciousness: 0-->Alert, keenly responsive  1b. LOC Questions: 0-->Answers both questions correctly  1c. LOC Commands: 0-->Performs both tasks correctly  2. Best Gaze: 0-->Normal  3. Visual: 1-->Partial hemianopia  4. Facial Palsy: 1-->Minor paralysis (flattened nasolabial fold, asymmetry on smiling)  5a. Motor Arm, Left: 2-->Some effort against gravity, limb cannot get to or maintain (if cued) 90 (or 45) degrees, drifts down to bed, but has some effort against gravity  5b. Motor Arm, Right: 0-->No drift, limb holds 90 (or 45) degrees for full 10 secs  6a. Motor Leg, Left: 2-->Some effort against gravity, leg falls to bed by 5 secs, but has some effort against gravity  6b. Motor Leg, Right: 0-->No drift, leg holds 30 degree position for full 5 secs  7. Limb Ataxia: 0-->Absent  8. Sensory: 0-->Normal, no sensory loss  9. Best Language: 0-->No aphasia, normal  10. Dysarthria: 0-->Normal  11. Extinction and Inattention (formerly Neglect): 1-->Visual, tactile, auditory, spatial, or personal inattention or extinction to bilateral simultaneous stimulation in one of the sensory modalities  Total (NIH Stroke Scale): 7        Modified Lar[place]ie Score: 4  Enterprise Coma Scale:    ABCD2 Score:    [id number] Score:   HAS -BLED Score:   ICH Score:   Hunt & Wilson Classification:       Assessment/Plan:     Diagnostic Results:      Brain Imaging:  CTH with subacute right MCA infarct      MRI brain with right medial temporo-occipital territory infarct (lateral thalamus) with  proximal P1 segment PCA occlusion     Cardiac Imaging     Left Ventricle: The left ventricle is normal in size. Ventricular mass is normal. Normal wall thickness. There is concentric remodeling. Normal wall motion. There is normal systolic function with a visually estimated ejection fraction of 60 - 65%. There is indeterminate diastolic function. Elevated left ventricular filling pressure.    Right Ventricle: Normal right ventricular cavity size. Wall thickness is normal. Systolic function is normal.    Left Atrium: Agitated saline study of the atrial septum is negative after vasalva maneuver, suggesting absence of intracardiac shunt at the atrial level.    Aortic Valve: There is mild annular calcification present.    Mitral Valve: There is moderate mitral annular calcification present.    Pulmonary Artery: Pulmonary artery pressure could not be estimated.    IVC/SVC: Normal venous pressure at 3 mmHg.    Interventions: None    Complications: None    Disposition: Skilled Nursing Facility    Final Active Diagnoses:    Diagnosis Date Noted POA    PRINCIPAL PROBLEM:  Stroke due to occlusion of right posterior cerebral artery [I63.531] 12/20/2024 Yes    Constipation [K59.00] 12/24/2024 No    Reduced mobility [Z74.09] 12/22/2024 Yes    Hemiplegia [G81.90] 12/22/2024 Yes    Chronic anticoagulation [Z79.01] 12/22/2024 Not Applicable    CHF (congestive heart failure) [I50.9] 12/20/2024 Yes    A-fib [I48.91] 01/03/2018 Yes    Essential hypertension [I10] 01/03/2018 Yes    GERD (gastroesophageal reflux disease) [K21.9] 01/03/2018 Yes    Urge incontinence [N39.41] 09/25/2017 Yes    Diabetes mellitus, type II [E11.9] 10/30/2012 Yes      Problems Resolved During this Admission:    Diagnosis Date Noted Date Resolved POA    ARNULFO (acute kidney injury) [N17.9] 12/25/2024 12/27/2024 No    Hypercoagulable state due to atrial fibrillation [D68.69, I48.91] 12/22/2024 12/23/2024 Yes    Hyponatremia [E87.1] 12/22/2024 12/29/2024 No     No  new Assessment & Plan notes have been filed under this hospital service since the last note was generated.  Service: Vascular Neurology      Recommendations:     Post-discharge complication risks: Falls, Skin breakdown    Stroke Education given to: patient and family    Follow-up in Stroke Clinic in 4-6 weeks.     Discharge Plan:  Statin: Atorvastatin 40mg  Anticoagulant: Rivaroxaban  Aggresive risk factor modification:  Hypertension  Diabetes  Diet  Exercise  Obesity  Atrial Fibrillation    Follow Up:      Patient Instructions:      Ambulatory referral/consult to Hepatology   Standing Status: Future   Referral Priority: Routine Referral Type: Consultation   Referral Reason: Specialty Services Required   Requested Specialty: Hepatology   Number of Visits Requested: 1     Ambulatory referral/consult to Vascular Neurology   Standing Status: Future   Referral Priority: Routine Referral Type: Consultation   Referral Reason: Specialty Services Required   Requested Specialty: Vascular Neurology   Number of Visits Requested: 1     Diet diabetic     Call 911 for any of the following:   Order Comments: Call 911  right away if any of the following warning signs come on suddenly, even if the symptoms only last for a few minutes. With stroke, timing is very important.   - Warning Signs of Stroke:  - Weakness: You may feel a sudden weakness, tingling or loss of feeling on one side of your face or body.  - Vision Problems: You may have sudden double vision or trouble seeing in one or both eyes.  - Speech Problems: You may have sudden trouble talking, slured speech, or problems understanding others.  - Headache: You may have sudden, severe headache.  - Movement Problems: You may experience dizziness, a feeling of spinning, a loss of balance, a feeling of falling or blackouts.     Activity as tolerated       Medications:  Reconciled Home Medications:      Medication List        CHANGE how you take these medications       atorvastatin 40 MG tablet  Commonly known as: LIPITOR  Take 1 tablet (40 mg total) by mouth once daily.  What changed:   medication strength  how much to take  when to take this            CONTINUE taking these medications      * ACCU-CHEK GUIDE TEST STRIPS Strp  Generic drug: blood sugar diagnostic  USE 1 STRIP TWICE DAILY     * ACCU-CHEK GUIDE TEST STRIPS MISC  by Misc.(Non-Drug; Combo Route) route.     ACCU-CHEK SOFT DEV LANCETS Kit  Generic drug: lancing device with lancets  USE AS DIRECTED TO CHECK GLUCOSE     * ACCU-CHEK SOFTCLIX LANCETS Misc  Generic drug: lancets  Apply topically 2 (two) times daily.     * ACCU-CHEK SOFTCLIX LANCETS MISC  by Misc.(Non-Drug; Combo Route) route.     acetaminophen 325 MG tablet  Commonly known as: TYLENOL  Take 2 tablets (650 mg total) by mouth every 6 (six) hours as needed for Pain.     amiodarone 200 MG Tab  Commonly known as: PACERONE  Take 100 mg by mouth every morning.     calcium carbonate 600 mg calcium (1,500 mg) Tab  Commonly known as: OS-PAU  Take 1,200 mg by mouth 2 (two) times daily with meals.     calcium-vitamin D3 500 mg-5 mcg (200 unit) per tablet  Commonly known as: OS- + D3  Take 1 tablet by mouth 2 (two) times daily with meals.     FARXIGA 5 mg Tab tablet  Generic drug: dapagliflozin propanediol  Take 5 mg by mouth.     furosemide 20 MG tablet  Commonly known as: LASIX  Take 20 mg by mouth every other day.     gabapentin 300 MG capsule  Commonly known as: NEURONTIN  Take 300 mg by mouth 3 (three) times daily.     * glipiZIDE 10 MG tablet  Commonly known as: GLUCOTROL  Take 20 mg by mouth 2 (two) times daily.     loratadine 10 mg tablet  Commonly known as: CLARITIN  Take 10 mg by mouth once daily.     metoprolol tartrate 50 MG tablet  Commonly known as: LOPRESSOR  Take 50 mg by mouth 2 (two) times daily.     multivitamin per tablet  Commonly known as: THERAGRAN  Take 1 tablet by mouth once daily.     mv-min-FA-D3-om3-dha-epa-fish 200 mcg-500  unit-200 mg Cap  Take 1 capsule by mouth 2 (two) times daily.     olmesartan 5 MG Tab  Commonly known as: BENICAR  Take by mouth once daily. 2.5 mg daily     oxybutynin 10 MG 24 hr tablet  Commonly known as: DITROPAN-XL  oxybutynin chloride ER 10 mg tablet,extended release 24 hr   TAKE 1 TABLET BY MOUTH  DAILY AS DIRECTED     pantoprazole 40 MG tablet  Commonly known as: PROTONIX  Take 40 mg by mouth every morning.     rivaroxaban 15 mg Tab  Commonly known as: XARELTO  Take 15 mg by mouth every evening.     triamcinolone acetonide 0.1% 0.1 % cream  Commonly known as: KENALOG  Apply topically 2 (two) times daily.     VITAMIN C 250 mg Chew  Generic drug: ascorbic acid (vitamin C)  Take by mouth.           * This list has 5 medication(s) that are the same as other medications prescribed for you. Read the directions carefully, and ask your doctor or other care provider to review them with you.                ASK your doctor about these medications      diltiaZEM 120 MG Cp24  Commonly known as: CARDIZEM CD  diltiazem ER (XR/XT) 120 mg capsule,extended release 24 hr, controlled     * glipiZIDE 5 MG tablet  Commonly known as: GLUCOTROL  Take 5 mg by mouth 2 (two) times daily.     latanoprost 0.005 % ophthalmic solution  latanoprost 0.005 % eye drops     losartan 50 MG tablet  Commonly known as: COZAAR  Take 50 mg by mouth every morning.     UNABLE TO FIND  medication name: Clindamycin/ Mupirocin/ Itraconazole 150/20/50mg Topical Capsule     UNABLE TO FIND  medication name: gentamicin 60 mg Xylitol           * This list has 1 medication(s) that are the same as other medications prescribed for you. Read the directions carefully, and ask your doctor or other care provider to review them with you.                  Ольга Newman PA-C  [facility]  Department of Vascular Neurology   [facility] Neurosurgery [facility])

## 2024-12-31 NOTE — PROGRESS NOTES
Patient left with EMS at 2053. IV still in place for next facilities use. Telemetry removed. Vitals stable. Patient in no distress. NIH 7.

## 2025-06-19 NOTE — PLAN OF CARE
Problem: Stroke, Ischemic (Includes Transient Ischemic Attack)  Goal: Optimal Cerebral Tissue Perfusion  Outcome: Progressing     Problem: Stroke, Ischemic (Includes Transient Ischemic Attack)  Goal: Safe and Effective Swallow  Outcome: Progressing     Problem: Skin Injury Risk Increased  Goal: Skin Health and Integrity  Outcome: Progressing     Problem: Fall Injury Risk  Goal: Absence of Fall and Fall-Related Injury  Outcome: Progressing     Problem: Stroke, Ischemic (Includes Transient Ischemic Attack)  Goal: Optimal Coping  Outcome: Progressing     Problem: Diabetes Comorbidity  Goal: Blood Glucose Level Within Targeted Range  Outcome: Progressing     Problem: Adult Inpatient Plan of Care  Goal: Plan of Care Review  Outcome: Progressing      Noted. Routed through Right[employer]ax to Tracey Sanchez.